# Patient Record
Sex: MALE | Employment: FULL TIME | URBAN - METROPOLITAN AREA
[De-identification: names, ages, dates, MRNs, and addresses within clinical notes are randomized per-mention and may not be internally consistent; named-entity substitution may affect disease eponyms.]

---

## 2018-02-06 ENCOUNTER — APPOINTMENT (OUTPATIENT)
Dept: CT IMAGING | Age: 48
End: 2018-02-06
Attending: EMERGENCY MEDICINE
Payer: COMMERCIAL

## 2018-02-06 ENCOUNTER — HOSPITAL ENCOUNTER (EMERGENCY)
Age: 48
Discharge: HOME | End: 2018-02-07
Attending: EMERGENCY MEDICINE
Payer: COMMERCIAL

## 2018-02-06 DIAGNOSIS — N20.0 KIDNEY STONE: Primary | ICD-10-CM

## 2018-02-06 LAB
APPEARANCE UR: CLEAR
BACTERIA URNS QL MICRO: NEGATIVE /HPF
BILIRUB UR QL: NEGATIVE
COLOR UR: YELLOW
EPITH CASTS URNS QL MICRO: NEGATIVE /LPF (ref 0–5)
GLUCOSE UR STRIP.AUTO-MCNC: NEGATIVE MG/DL
HGB UR QL STRIP: ABNORMAL
KETONES UR QL STRIP.AUTO: NEGATIVE MG/DL
LEUKOCYTE ESTERASE UR QL STRIP.AUTO: NEGATIVE
MUCOUS THREADS URNS QL MICRO: ABNORMAL /LPF
NITRITE UR QL STRIP.AUTO: NEGATIVE
PH UR STRIP: 5.5 [PH] (ref 5–8)
PROT UR STRIP-MCNC: NEGATIVE MG/DL
RBC #/AREA URNS HPF: ABNORMAL /HPF (ref 0–5)
SP GR UR REFRACTOMETRY: 1.02 (ref 1–1.03)
UROBILINOGEN UR QL STRIP.AUTO: 1 EU/DL (ref 0.2–1)
WBC URNS QL MICRO: NEGATIVE /HPF (ref 0–4)

## 2018-02-06 PROCEDURE — 99283 EMERGENCY DEPT VISIT LOW MDM: CPT

## 2018-02-06 PROCEDURE — 96361 HYDRATE IV INFUSION ADD-ON: CPT

## 2018-02-06 PROCEDURE — 81001 URINALYSIS AUTO W/SCOPE: CPT | Performed by: EMERGENCY MEDICINE

## 2018-02-06 PROCEDURE — 74011250636 HC RX REV CODE- 250/636: Performed by: EMERGENCY MEDICINE

## 2018-02-06 PROCEDURE — 74176 CT ABD & PELVIS W/O CONTRAST: CPT

## 2018-02-06 PROCEDURE — 96360 HYDRATION IV INFUSION INIT: CPT

## 2018-02-06 RX ORDER — OXYCODONE AND ACETAMINOPHEN 5; 325 MG/1; MG/1
1 TABLET ORAL
Qty: 10 TAB | Refills: 0 | Status: SHIPPED | OUTPATIENT
Start: 2018-02-06 | End: 2018-02-10

## 2018-02-06 RX ORDER — TAMSULOSIN HYDROCHLORIDE 0.4 MG/1
0.4 CAPSULE ORAL DAILY
Qty: 15 CAP | Refills: 0 | Status: SHIPPED | OUTPATIENT
Start: 2018-02-06 | End: 2018-02-10

## 2018-02-06 RX ADMIN — SODIUM CHLORIDE 1000 ML: 900 INJECTION, SOLUTION INTRAVENOUS at 22:31

## 2018-02-07 VITALS
OXYGEN SATURATION: 98 % | HEART RATE: 72 BPM | RESPIRATION RATE: 16 BRPM | BODY MASS INDEX: 25.77 KG/M2 | WEIGHT: 180 LBS | DIASTOLIC BLOOD PRESSURE: 80 MMHG | TEMPERATURE: 98.7 F | HEIGHT: 70 IN | SYSTOLIC BLOOD PRESSURE: 129 MMHG

## 2018-02-07 NOTE — ED PROVIDER NOTES
EMERGENCY DEPARTMENT HISTORY AND PHYSICAL EXAM    8:51 PM      Date: 2/6/2018  Patient Name: Nahid Cardona    History of Presenting Illness     Chief Complaint   Patient presents with    Abdominal Pain    Flank Pain     right    Vomiting         History Provided By: Patient    Chief Complaint: Abdominal Pain  Duration: 9 Hours  Timing:  Constant  Location: lower right side radiating into back  Quality:  Severity: 9 out of 10  Modifying Factors: denies hx of kidney stones or use of pain medication  Associated Symptoms: nausea, emesis. Denies fever, dysuria, hematuria, or abnormal BM      Additional History (Context): Nahid Cardona is a 49 y.o. male with No significant past medical history who presents with c/o constant 9 out of 10 lower right side abdominal pain radiating into lower back onset 9 hours. Pt states pain began at noon today (9 hours) with a singular episode of nausea/emesis at 1pm (8 hours ago) with pain setting in more once arriving home at 5:30pm (3.5 hours). Denies fever, dysuria, hematuria, or abnormal BM. Pt reports he has been belching a lot today. Denies use of medication for pain relief. Wife at bedside. PCP: None        Past History     Past Medical History:  No past medical history on file. Past Surgical History:  No past surgical history on file. Family History:  No family history on file. Social History:  Social History   Substance Use Topics    Smoking status: Not on file    Smokeless tobacco: Not on file    Alcohol use Not on file       Allergies: Allergies   Allergen Reactions    Codeine Other (comments)     Pt. States \"I throw up\". Review of Systems       Review of Systems   Constitutional: Negative for fever. HENT: Negative for congestion. Respiratory: Negative for cough and shortness of breath. Cardiovascular: Negative for chest pain and leg swelling.    Gastrointestinal: Positive for abdominal pain (lower right sided), nausea and vomiting. Negative for constipation and diarrhea. Genitourinary: Negative for dysuria and hematuria. Musculoskeletal: Positive for back pain (lower right side). Neurological: Negative for light-headedness and headaches. All other systems reviewed and are negative. Physical Exam     Visit Vitals    /80 (BP 1 Location: Left arm, BP Patient Position: Sitting)    Pulse 72    Temp 98.7 °F (37.1 °C)    Resp 16    Ht 5' 10\" (1.778 m)    Wt 81.6 kg (180 lb)    SpO2 98%    BMI 25.83 kg/m2         Physical Exam   Constitutional: He is oriented to person, place, and time. HENT:   Head: Atraumatic. Eyes: Conjunctivae are normal.   Neck: Neck supple. Cardiovascular: Normal rate, regular rhythm and normal heart sounds. Pulmonary/Chest: Effort normal and breath sounds normal. No respiratory distress. He exhibits no tenderness. Abdominal: Soft. Bowel sounds are normal. He exhibits no distension. There is no tenderness. There is no rebound and no guarding. No cva ttp bilaterally   Musculoskeletal: Normal range of motion. He exhibits no edema or tenderness. Neurological: He is alert and oriented to person, place, and time. Skin: Skin is warm and dry. Psychiatric: He has a normal mood and affect. Nursing note and vitals reviewed.         Diagnostic Study Results     Labs -  Recent Results (from the past 12 hour(s))   URINALYSIS W/ RFLX MICROSCOPIC    Collection Time: 02/06/18  7:53 PM   Result Value Ref Range    Color YELLOW      Appearance CLEAR      Specific gravity 1.022 1.005 - 1.030      pH (UA) 5.5 5.0 - 8.0      Protein NEGATIVE  NEG mg/dL    Glucose NEGATIVE  NEG mg/dL    Ketone NEGATIVE  NEG mg/dL    Bilirubin NEGATIVE  NEG      Blood LARGE (A) NEG      Urobilinogen 1.0 0.2 - 1.0 EU/dL    Nitrites NEGATIVE  NEG      Leukocyte Esterase NEGATIVE  NEG     URINE MICROSCOPIC ONLY    Collection Time: 02/06/18  7:53 PM   Result Value Ref Range    WBC NEGATIVE  0 - 4 /hpf    RBC 75 to 100 0 - 5 /hpf    Epithelial cells NEGATIVE  0 - 5 /lpf    Bacteria NEGATIVE  NEG /hpf    Mucus 1+ (A) NEG /lpf       Radiologic Studies -   CT ABD PELV WO CONT   Final Result            Medical Decision Making   I am the first provider for this patient. I reviewed the vital signs, available nursing notes, past medical history, past surgical history, family history and social history. Vital Signs-Reviewed the patient's vital signs. Provider Notes (Medical Decision Making):   Pt presenting with right flank pain, resolved significantly since onset. Abdomen benign. ua obtained and I have discussed results of work up with patient. No h/o stones. Ct a/p obtained and I have discussed results of work up with patient. Repeat exams benign. Stable for outpt follow up at this time. Return precautions discussed. Patient stated verbal understanding and agrees with course and plan. Diagnosis     Clinical Impression:   1. Kidney stone        Disposition: Discharged home in stable condition      Follow-up Information     Follow up With Details Comments [address] MD Call As needed [address]  [address] [facility] [address]  [phone]      [facility] EMERGENCY DEPT  As needed, If symptoms worsen [address] [address]  [phone]           Patient's Medications   Start Taking    OXYCODONE-ACETAMINOPHEN (PERCOCET) 5-325 MG PER TABLET    Take 1 Tab by mouth every four (4) hours as needed for Pain. Max Daily Amount: 6 Tabs. TAMSULOSIN (FLOMAX) 0.4 MG CAPSULE    Take 1 Cap by mouth daily for 15 days.    Continue Taking    No medications on file   These Medications have changed    No medications on file   Stop Taking    No medications on file     _______________________________    Attestations:  340Nader Rubio acting as a scribe for and in the presence of Viola Oconnor MD      February 06, 2018 at 8:05 PM       Provider Attestation:      I personally performed the services described in the documentation, reviewed the documentation, as recorded by the scribe in my presence, and it accurately and completely records my words and actions.  February 06, 2018 at 8:05 PM - Lucy Butler MD    _______________________________

## 2018-02-07 NOTE — ED TRIAGE NOTES
Pt. states \"I'm having severe abominal pain in the right lower section of my stomach and back  pain in my kidney area\" refers to right flank. Reports symptoms started \"around 12 today\". Pt. also reports vomiting twice today.

## 2018-02-07 NOTE — DISCHARGE INSTRUCTIONS

## 2018-02-10 ENCOUNTER — HOSPITAL ENCOUNTER (OUTPATIENT)
Age: 48
Setting detail: OBSERVATION
LOS: 1 days | Discharge: HOME | End: 2018-02-12
Attending: EMERGENCY MEDICINE | Admitting: INTERNAL MEDICINE
Payer: COMMERCIAL

## 2018-02-10 DIAGNOSIS — N20.0 KIDNEY STONE: ICD-10-CM

## 2018-02-10 DIAGNOSIS — N20.1 RIGHT URETERAL STONE: Primary | ICD-10-CM

## 2018-02-10 PROBLEM — N17.9 ACUTE KIDNEY INJURY (NONTRAUMATIC) (HCC): Status: ACTIVE | Noted: 2018-02-10

## 2018-02-10 PROBLEM — E86.0 DEHYDRATION: Status: ACTIVE | Noted: 2018-02-10

## 2018-02-10 LAB
ANION GAP SERPL CALC-SCNC: 8 MMOL/L (ref 3–18)
APPEARANCE UR: CLEAR
BACTERIA URNS QL MICRO: NEGATIVE /HPF
BASOPHILS # BLD: 0 K/UL (ref 0–0.06)
BASOPHILS NFR BLD: 0 % (ref 0–3)
BILIRUB UR QL: NEGATIVE
BUN SERPL-MCNC: 17 MG/DL (ref 7–18)
BUN/CREAT SERPL: 9 (ref 12–20)
CALCIUM SERPL-MCNC: 8.4 MG/DL (ref 8.5–10.1)
CAOX CRY URNS QL MICRO: ABNORMAL
CHLORIDE SERPL-SCNC: 106 MMOL/L (ref 100–108)
CO2 SERPL-SCNC: 25 MMOL/L (ref 21–32)
COLOR UR: YELLOW
CREAT SERPL-MCNC: 1.92 MG/DL (ref 0.6–1.3)
DIFFERENTIAL METHOD BLD: ABNORMAL
EOSINOPHIL # BLD: 0.2 K/UL (ref 0–0.4)
EOSINOPHIL NFR BLD: 2 % (ref 0–5)
EPITH CASTS URNS QL MICRO: ABNORMAL /LPF (ref 0–5)
ERYTHROCYTE [DISTWIDTH] IN BLOOD BY AUTOMATED COUNT: 12.9 % (ref 11.6–14.5)
GLUCOSE SERPL-MCNC: 103 MG/DL (ref 74–99)
GLUCOSE UR STRIP.AUTO-MCNC: NEGATIVE MG/DL
HCT VFR BLD AUTO: 36.7 % (ref 36–48)
HGB BLD-MCNC: 12.7 G/DL (ref 13–16)
HGB UR QL STRIP: ABNORMAL
KETONES UR QL STRIP.AUTO: NEGATIVE MG/DL
LEUKOCYTE ESTERASE UR QL STRIP.AUTO: NEGATIVE
LYMPHOCYTES # BLD: 0.6 K/UL (ref 0.8–3.5)
LYMPHOCYTES NFR BLD: 8 % (ref 20–51)
MCH RBC QN AUTO: 29.1 PG (ref 24–34)
MCHC RBC AUTO-ENTMCNC: 34.6 G/DL (ref 31–37)
MCV RBC AUTO: 84 FL (ref 74–97)
MONOCYTES # BLD: 0.5 K/UL (ref 0–1)
MONOCYTES NFR BLD: 6 % (ref 2–9)
NEUTS SEG # BLD: 6.6 K/UL (ref 1.8–8)
NEUTS SEG NFR BLD: 82 % (ref 42–75)
NITRITE UR QL STRIP.AUTO: NEGATIVE
OTHER CELLS NFR BLD MANUAL: 2 %
PH UR STRIP: 5.5 [PH] (ref 5–8)
PLATELET # BLD AUTO: 180 K/UL (ref 135–420)
PLATELET COMMENTS,PCOM: ABNORMAL
PMV BLD AUTO: 10.4 FL (ref 9.2–11.8)
POTASSIUM SERPL-SCNC: 4 MMOL/L (ref 3.5–5.5)
PROT UR STRIP-MCNC: NEGATIVE MG/DL
RBC # BLD AUTO: 4.37 M/UL (ref 4.7–5.5)
RBC #/AREA URNS HPF: ABNORMAL /HPF (ref 0–5)
RBC MORPH BLD: ABNORMAL
SODIUM SERPL-SCNC: 139 MMOL/L (ref 136–145)
SP GR UR REFRACTOMETRY: 1.02 (ref 1–1.03)
UROBILINOGEN UR QL STRIP.AUTO: 1 EU/DL (ref 0.2–1)
WBC # BLD AUTO: 8 K/UL (ref 4.6–13.2)
WBC URNS QL MICRO: ABNORMAL /HPF (ref 0–4)

## 2018-02-10 PROCEDURE — 81001 URINALYSIS AUTO W/SCOPE: CPT | Performed by: PHYSICIAN ASSISTANT

## 2018-02-10 PROCEDURE — 99218 HC RM OBSERVATION: CPT

## 2018-02-10 PROCEDURE — 74011000250 HC RX REV CODE- 250: Performed by: HOSPITALIST

## 2018-02-10 PROCEDURE — 96361 HYDRATE IV INFUSION ADD-ON: CPT

## 2018-02-10 PROCEDURE — 85025 COMPLETE CBC W/AUTO DIFF WBC: CPT | Performed by: PHYSICIAN ASSISTANT

## 2018-02-10 PROCEDURE — 80048 BASIC METABOLIC PNL TOTAL CA: CPT | Performed by: PHYSICIAN ASSISTANT

## 2018-02-10 PROCEDURE — 74011250636 HC RX REV CODE- 250/636: Performed by: HOSPITALIST

## 2018-02-10 PROCEDURE — 74011250636 HC RX REV CODE- 250/636: Performed by: EMERGENCY MEDICINE

## 2018-02-10 PROCEDURE — 96374 THER/PROPH/DIAG INJ IV PUSH: CPT

## 2018-02-10 PROCEDURE — 99283 EMERGENCY DEPT VISIT LOW MDM: CPT

## 2018-02-10 PROCEDURE — 96372 THER/PROPH/DIAG INJ SC/IM: CPT

## 2018-02-10 PROCEDURE — 96375 TX/PRO/DX INJ NEW DRUG ADDON: CPT

## 2018-02-10 PROCEDURE — 74011250636 HC RX REV CODE- 250/636: Performed by: INTERNAL MEDICINE

## 2018-02-10 PROCEDURE — 87086 URINE CULTURE/COLONY COUNT: CPT | Performed by: EMERGENCY MEDICINE

## 2018-02-10 RX ORDER — KETOROLAC TROMETHAMINE 30 MG/ML
30 INJECTION, SOLUTION INTRAMUSCULAR; INTRAVENOUS
Status: COMPLETED | OUTPATIENT
Start: 2018-02-10 | End: 2018-02-10

## 2018-02-10 RX ORDER — OXYCODONE AND ACETAMINOPHEN 5; 325 MG/1; MG/1
1 TABLET ORAL
COMMUNITY
End: 2018-02-12

## 2018-02-10 RX ORDER — CIPROFLOXACIN 500 MG/1
500 TABLET ORAL 2 TIMES DAILY
Qty: 6 TAB | Refills: 0 | Status: SHIPPED | OUTPATIENT
Start: 2018-02-10 | End: 2018-02-13

## 2018-02-10 RX ORDER — HYDROCODONE BITARTRATE AND ACETAMINOPHEN 5; 325 MG/1; MG/1
1 TABLET ORAL
Status: DISCONTINUED | OUTPATIENT
Start: 2018-02-10 | End: 2018-02-13 | Stop reason: HOSPADM

## 2018-02-10 RX ORDER — OXYCODONE AND ACETAMINOPHEN 5; 325 MG/1; MG/1
1 TABLET ORAL
Qty: 10 TAB | Refills: 0 | Status: SHIPPED | OUTPATIENT
Start: 2018-02-10 | End: 2018-02-12

## 2018-02-10 RX ORDER — TAMSULOSIN HYDROCHLORIDE 0.4 MG/1
0.4 CAPSULE ORAL DAILY
Qty: 15 CAP | Refills: 0 | Status: SHIPPED | OUTPATIENT
Start: 2018-02-10 | End: 2018-02-19

## 2018-02-10 RX ORDER — ADHESIVE BANDAGE
30 BANDAGE TOPICAL DAILY PRN
Status: DISCONTINUED | OUTPATIENT
Start: 2018-02-10 | End: 2018-02-13 | Stop reason: HOSPADM

## 2018-02-10 RX ORDER — SODIUM CHLORIDE 9 MG/ML
75 INJECTION, SOLUTION INTRAVENOUS CONTINUOUS
Status: DISCONTINUED | OUTPATIENT
Start: 2018-02-10 | End: 2018-02-12

## 2018-02-10 RX ORDER — IBUPROFEN 800 MG/1
800 TABLET ORAL EVERY 8 HOURS
Qty: 15 TAB | Refills: 0 | Status: SHIPPED | OUTPATIENT
Start: 2018-02-10 | End: 2018-02-12

## 2018-02-10 RX ORDER — TAMSULOSIN HYDROCHLORIDE 0.4 MG/1
0.4 CAPSULE ORAL DAILY
COMMUNITY
End: 2018-02-12

## 2018-02-10 RX ORDER — ACETAMINOPHEN 325 MG/1
650 TABLET ORAL
Status: DISCONTINUED | OUTPATIENT
Start: 2018-02-10 | End: 2018-02-13 | Stop reason: HOSPADM

## 2018-02-10 RX ORDER — MORPHINE SULFATE 2 MG/ML
1 INJECTION, SOLUTION INTRAMUSCULAR; INTRAVENOUS
Status: DISCONTINUED | OUTPATIENT
Start: 2018-02-10 | End: 2018-02-11

## 2018-02-10 RX ORDER — ONDANSETRON 2 MG/ML
4 INJECTION INTRAMUSCULAR; INTRAVENOUS
Status: DISCONTINUED | OUTPATIENT
Start: 2018-02-10 | End: 2018-02-13 | Stop reason: HOSPADM

## 2018-02-10 RX ORDER — HEPARIN SODIUM 5000 [USP'U]/ML
5000 INJECTION, SOLUTION INTRAVENOUS; SUBCUTANEOUS EVERY 8 HOURS
Status: DISCONTINUED | OUTPATIENT
Start: 2018-02-10 | End: 2018-02-13 | Stop reason: HOSPADM

## 2018-02-10 RX ADMIN — CEFTRIAXONE SODIUM 2 G: 2 INJECTION, POWDER, FOR SOLUTION INTRAMUSCULAR; INTRAVENOUS at 19:52

## 2018-02-10 RX ADMIN — SODIUM CHLORIDE 1000 ML: 900 INJECTION, SOLUTION INTRAVENOUS at 19:52

## 2018-02-10 RX ADMIN — KETOROLAC TROMETHAMINE 30 MG: 30 INJECTION, SOLUTION INTRAMUSCULAR at 19:52

## 2018-02-10 RX ADMIN — SODIUM CHLORIDE 150 ML/HR: 900 INJECTION, SOLUTION INTRAVENOUS at 19:53

## 2018-02-10 RX ADMIN — HEPARIN SODIUM 5000 UNITS: 5000 INJECTION, SOLUTION INTRAVENOUS; SUBCUTANEOUS at 21:56

## 2018-02-10 NOTE — IP AVS SNAPSHOT
[address] [place], [place] [id number] 
[phone] Patient: Romina Zamora MRN: [id number] :[id number] About your hospitalization You were admitted on:  February 10, 2018 You last received care in the:  [facility] PACU You were discharged on:  2018 Why you were hospitalized Your primary diagnosis was:  Not on File Your diagnoses also included:  Dehydration, Acute Kidney Injury (Nontraumatic) (Hcc), Right Ureteral Stone, Right Nephrolithiasis Follow-up Information Follow up With Details Comments Contact Info None   None ([phone]) Patient stated that they have no PCP Ely Gonsalves MD  the office will call and make an appt with you for lithotripsy [facility] [address] [place] [id number] 
[phone] Discharge Orders None A check ced indicates which time of day the medication should be taken. My Medications START taking these medications Instructions Each Dose to Equal  
 Morning Noon Evening Bedtime  
 cephALEXin 250 mg capsule Commonly known as:  Gail Desanctis Take 1 Cap by mouth two (2) times a day for 10 doses. Indications: PREVENTION OF BACTERIAL URINARY TRACT INFECTION  
 250 mg  
    
  
   
   
  
   
  
 ciprofloxacin HCl 500 mg tablet Commonly known as:  CIPRO Take 1 Tab by mouth two (2) times a day for 3 days. 500 mg HYDROcodone-acetaminophen 5-300 mg tablet Commonly known as:  Ruthell Montana Take 1 Tab by mouth every four (4) hours as needed for up to 10 doses. Max Daily Amount: 6 Tabs. 1 Tab  
    
   
   
   
  
 polyethylene glycol 17 gram packet Commonly known as:  Dinesh Chris Start taking on:  2018 Your last dose was:  18--9am  
   
 Take 1 Packet by mouth daily. 17 g CHANGE how you take these medications  Instructions Each Dose to Equal  
 Morning Noon Evening Bedtime  
 tamsulosin 0.4 mg capsule Commonly known as:  FLOMAX What changed:  Another medication with the same name was removed. Continue taking this medication, and follow the directions you see here. Take 1 Cap by mouth daily for 15 days. 0.4 mg  
    
   
   
  
   
  
  
STOP taking these medications   
 oxyCODONE-acetaminophen 5-325 mg per tablet Commonly known as:  PERCOCET PERCOCET 5-325 mg per tablet Generic drug:  oxyCODONE-acetaminophen Where to Get Your Medications Information on where to get these meds will be given to you by the nurse or doctor. ! Ask your nurse or doctor about these medications  
  cephALEXin 250 mg capsule  
 ciprofloxacin HCl 500 mg tablet HYDROcodone-acetaminophen 5-300 mg tablet  
 polyethylene glycol 17 gram packet  
 tamsulosin 0.4 mg capsule Discharge Instructions Kidney Stone: Care Instructions Your Care Instructions Kidney stones are formed when salts, minerals, and other substances normally found in the urine clump together. They can be as small as grains of sand or, rarely, as large as golf balls. While the stone is traveling through the ureter, which is the tube that carries urine from the kidney to the bladder, you will probably feel pain. The pain may be mild or very severe. You may also have some blood in your urine. As soon as the stone reaches the bladder, any intense pain should go away. If a stone is too large to pass on its own, you may need a medical procedure to help you pass the stone. The doctor has checked you carefully, but problems can develop later. If you notice any problems or new symptoms, get medical treatment right away. Follow-up care is a key part of your treatment and safety. Be sure to make and go to all appointments, and call your doctor if you are having problems.  It's also a good idea to know your test results and keep a list of the medicines you take. How can you care for yourself at home? · Drink plenty of fluids, enough so that your urine is light yellow or clear like water. If you have kidney, heart, or liver disease and have to limit fluids, talk with your doctor before you increase the amount of fluids you drink. · Take pain medicines exactly as directed. Call your doctor if you think you are having a problem with your medicine. ¨ If the doctor gave you a prescription medicine for pain, take it as prescribed. ¨ If you are not taking a prescription pain medicine, ask your doctor if you can take an over-the-counter medicine. Read and follow all instructions on the label. · Your doctor may ask you to strain your urine so that you can collect your kidney stone when it passes. You can use a kitchen strainer or a tea strainer to catch the stone. Store it in a plastic bag until you see your doctor again. Preventing future kidney stones Some changes in your diet may help prevent kidney stones. Depending on the cause of your stones, your doctor may recommend that you: · Drink plenty of fluids, enough so that your urine is light yellow or clear like water. If you have kidney, heart, or liver disease and have to limit fluids, talk with your doctor before you increase the amount of fluids you drink. · Limit coffee, tea, and alcohol. Also avoid grapefruit juice. · Do not take more than the recommended daily dose of vitamins C and D. 
· Avoid antacids such as Gaviscon, Maalox, Mylanta, or Tums. · Limit the amount of salt (sodium) in your diet. · Eat a balanced diet that is not too high in protein. · Limit foods that are high in a substance called oxalate, which can cause kidney stones. These foods include dark green vegetables, rhubarb, chocolate, wheat bran, nuts, cranberries, and beans. When should you call for help? Call your doctor now or seek immediate medical care if: 
? · You cannot keep down fluids. ? · Your pain gets worse. ? · You have a fever or chills. ? · You have new or worse pain in your back just below your rib cage (the flank area). ? · You have new or more blood in your urine. ? Watch closely for changes in your health, and be sure to contact your doctor if: 
? · You do not get better as expected. Where can you learn more? Go to http://marcella-jaqui.info/. Enter I244 in the search box to learn more about \"Kidney Stone: Care Instructions. \" Current as of: May 12, 2017 Content Version: 11.4 © 9991-7513 [employer]. Care instructions adapted under license by [employer] (which disclaims liability or warranty for this information). If you have questions about a medical condition or this instruction, always ask your healthcare professional. [address] any warranty or liability for your use of this information. Learning About Diet for Kidney Stone Prevention What are kidney stones? Kidney stones are made of salts and minerals in the urine that form small \"medhat. \" Stones can form in the kidneys and the ureters (the tubes that lead from the kidneys to the bladder). They can also form in the bladder. Stones may not cause a problem as long as they stay in the kidneys. But they can cause sudden, severe pain. Pain is most likely when the stones travel from the kidneys to the bladder. Kidney stones can cause bloody urine. Kidney stones often run in families. You are more likely to get them if you don't drink enough fluids, mainly water. Certain foods and drinks and some dietary supplements may also increase your risk for kidney stones if you consume too much of them. What can you do to prevent kidney stones? Changing what you eat may not prevent all types of kidney stones. But for people who have a history of certain kinds of kidney stones, some changes in diet may help.  A dietitian can help you set up a meal plan that includes healthy, low-oxalate choices. Here are some general guidelines to get you started. Plan your meals and snacks around foods that are low in oxalate. These foods include: · Corn, kale, parsnips, and squash,. · Beef, chicken, pork, turkey, and fish. · Milk, butter, cheese, and yogurt. You can eat certain foods that are medium-high in oxalate, but eat them only once in a while. These foods include: · Bread. · Brown rice. · English muffins. · Figs. · Popcorn. · String beans. · Tomatoes. Limit very high-oxalate foods, including: · Black tea. · Coffee. · Chocolate. · Dark green vegetables. · Nuts. Here are some other things you can do to help prevent kidney stones. · Drink plenty of fluids. If you have kidney, heart, or liver disease and have to limit fluids, talk with your doctor before you increase the amount of fluids you drink. · Do not take more than the recommended daily dose of vitamins C and D. 
· Limit the salt in your diet. · Eat a balanced diet that is not too high in protein. Follow-up care is a key part of your treatment and safety. Be sure to make and go to all appointments, and call your doctor if you are having problems. It's also a good idea to know your test results and keep a list of the medicines you take. Where can you learn more? Go to http://marcella-jaqui.info/. Enter C138 in the search box to learn more about \"Learning About Diet for Kidney Stone Prevention. \" Current as of: May 12, 2017 Content Version: 11.4 © 8650-8875 [employer]. Care instructions adapted under license by [employer] (which disclaims liability or warranty for this information). If you have questions about a medical condition or this instruction, always ask your healthcare professional. [address] any warranty or liability for your use of this information. DISCHARGE SUMMARY from Nurse PATIENT INSTRUCTIONS: 
 
 After general anesthesia or intravenous sedation, for 24 hours or while taking prescription Narcotics: · Limit your activities · Do not drive and operate hazardous machinery · Do not make important personal or business decisions · Do  not drink alcoholic beverages · If you have not urinated within 8 hours after discharge, please contact your surgeon on call. Report the following to your surgeon: 
· Excessive pain, swelling, redness or odor of or around the surgical area · Temperature over 100.5 · Nausea and vomiting lasting longer than 4 hours or if unable to take medications · Any signs of decreased circulation or nerve impairment to extremity: change in color, persistent  numbness, tingling, coldness or increase pain · Any questions What to do at Home: 
Recommended activity: Activity as tolerated and no driving for today and No driving while on analgesics. If you experience any of the following symptoms chest pain, shortness of breath, severe pain, temp > 101 please follow up with Emergency Department. If passing blood clots through urine, call urologist office. *  Please give a list of your current medications to your Primary Care Provider. *  Please update this list whenever your medications are discontinued, doses are 
    changed, or new medications (including over-the-counter products) are added. *  Please carry medication information at all times in case of emergency situations. These are general instructions for a healthy lifestyle: No smoking/ No tobacco products/ Avoid exposure to second hand smoke Surgeon General's Warning:  Quitting smoking now greatly reduces serious risk to your health. Obesity, smoking, and sedentary lifestyle greatly increases your risk for illness A healthy diet, regular physical exercise & weight monitoring are important for maintaining a healthy lifestyle You may be retaining fluid if you have a history of heart failure or if you experience any of the following symptoms:  Weight gain of 3 pounds or more overnight or 5 pounds in a week, increased swelling in our hands or feet or shortness of breath while lying flat in bed. Please call your doctor as soon as you notice any of these symptoms; do not wait until your next office visit. Recognize signs and symptoms of STROKE: 
 
F-face looks uneven A-arms unable to move or move unevenly S-speech slurred or non-existent T-time-call 911 as soon as signs and symptoms begin-DO NOT go Back to bed or wait to see if you get better-TIME IS BRAIN. Warning Signs of HEART ATTACK Call 911 if you have these symptoms: 
? Chest discomfort. Most heart attacks involve discomfort in the center of the chest that lasts more than a few minutes, or that goes away and comes back. It can feel like uncomfortable pressure, squeezing, fullness, or pain. ? Discomfort in other areas of the upper body. Symptoms can include pain or discomfort in one or both arms, the back, neck, jaw, or stomach. ? Shortness of breath with or without chest discomfort. ? Other signs may include breaking out in a cold sweat, nausea, or lightheadedness. Don't wait more than five minutes to call [address]! Fast action can save your life. Calling 911 is almost always the fastest way to get lifesaving treatment. Emergency Medical Services staff can begin treatment when they arrive  up to an hour sooner than if someone gets to the hospital by car. The discharge information has been reviewed with the patient. The patient verbalized understanding. Discharge medications reviewed with the patient and appropriate educational materials and side effects teaching were provided. Patient armband removed and shredded 
 
___________________________________________________________________________________________________________________________________ Introducing [facility] HE[facility]! Willy Ramirez introduces Task Messenger patient portal. Now you can access parts of your medical record, email your doctor's office, and request medication refills online. 1. In your internet browser, go to [email or web address] 2. Click on the First Time User? Click Here link in the Sign In box. You will see the New Member Sign Up page. 3. Enter your Task Messenger Access Code exactly as it appears below. You will not need to use this code after youve completed the sign-up process. If you do not sign up before the expiration date, you must request a new code. · Task Messenger Access Code: [email or web address] Expires: 5/7/2018 11:44 PM 
 
4. Enter the last four digits of your Social Security Number (xxxx) and Date of Birth (mm/dd/yyyy) as indicated and click Submit. You will be taken to the next sign-up page. 5. Create a Task Messenger ID. This will be your Task Messenger login ID and cannot be changed, so think of one that is secure and easy to remember. 6. Create a Task Messenger password. You can change your password at any time. 7. Enter your Password Reset Question and Answer. This can be used at a later time if you forget your password. 8. Enter your e-mail address. You will receive e-mail notification when new information is available in [address] 9. Click Sign Up. You can now view and download portions of your medical record. 10. Click the Download Summary menu link to download a portable copy of your medical information. If you have questions, please visit the Frequently Asked Questions section of the Task Messenger website. Remember, Task Messenger is NOT to be used for urgent needs. For medical emergencies, dial 911. Now available from your iPhone and Android! Unresulted Labs-Please follow up with your PCP about these lab tests Order Current Status XR RETROGRADE PYELOGRAM In process Providers Seen During Your Hospitalization Provider Specialty Primary office phone Keren Young MD Emergency Medicine [phone] Marilee Mensah MD Internal Medicine [phone] Holly Domingo MD Internal Medicine [phone] Immunizations Administered for This Admission Name Date Influenza Vaccine (Quad) PF  Deferred () Your Primary Care Physician (PCP) Primary Care Physician Office Phone Office Fax NONE ** None ** ** None ** You are allergic to the following Allergen Reactions Codeine Other (comments) Pt. States \"I throw up\". Recent Documentation Weight BMI  
  
  
  
 82.5 kg 26.1 kg/m2 Emergency Contacts Name Discharge Info Relation Home Work Mobile [facility] DISCHARGE CAREGIVER [3] Spouse [phone] Patient Belongings The following personal items are in your possession at time of discharge: 
  Dental Appliances: None         Home Medications: None   Jewelry: Ring  Clothing: Jacket/Coat, Pants, Shirt    Other Valuables: Cell Phone Please provide this summary of care documentation to your next provider. Signatures-by signing, you are acknowledging that this After Visit Summary has been reviewed with you and you have received a copy. Patient Signature:  ____________________________________________________________ Date:  ____________________________________________________________  
  
Charisma Lopez Provider Signature:  ____________________________________________________________ Date:  ____________________________________________________________

## 2018-02-10 NOTE — Clinical Note
Patient Class[de-identified] Observation [871] Type of Bed: Medical [8] Reason for Observation: see H&P Admitting Diagnosis: Right nephrolithiasis [[id number]] Admitting Physician: Therese Cook [[id number]] Attending Physician: Therese Cook [[id number]]

## 2018-02-10 NOTE — ED PROVIDER NOTES
EMERGENCY DEPARTMENT HISTORY AND PHYSICAL EXAM    Date: 2/10/2018  Patient Name: Carmen Ambrose    History of Presenting Illness     No chief complaint on file. History Provided By: Patient    Chief Complaint: kidney stone  Duration: 10 Days  Timing:  Constant  Location: right flank  Quality: Sharp  Severity: Severe  Modifying Factors: improvement with pain medications  Associated Symptoms: denies any other associated signs or symptoms      Additional History (Context): Carmen Ambrose is a 49 y.o. male with diagnosis of kidney stones on 2/6 who presents to the ED with cotinued flank pain. HE rates his pain as 10/10 and constant. He ran out of medications from last visit and went to [facility] who recommended he come back here. HE states that his pain has moved from flank into his lower back. HE has not followed up with Urology. HE dneis fever, painful urination, CP and SOB. PCP: None    Current Facility-Administered Medications   Medication Dose Route Frequency Provider Last Rate Last Dose    sodium chloride 0.9 % bolus infusion 1,000 mL  1,000 mL IntraVENous ONCE EDUIN Fairchild        0.9% sodium chloride infusion  150 mL/hr IntraVENous CONTINUOUS EDUIN Fairchild        cefTRIAXone (ROCEPHIN) 2 g in sterile water (preservative free) 20 mL IV syringe  2 g IntraVENous NOW Tess Paul III, MD        ketorolac (TORADOL) injection 30 mg  30 mg IntraVENous NOW EDUIN Fairchild         Current Outpatient Prescriptions   Medication Sig Dispense Refill    tamsulosin (FLOMAX) 0.4 mg capsule Take 1 Cap by mouth daily for 15 days. 15 Cap 0    oxyCODONE-acetaminophen (PERCOCET) 5-325 mg per tablet Take 1 Tab by mouth every four (4) hours as needed for Pain. Max Daily Amount: 6 Tabs. 10 Tab 0    ibuprofen (MOTRIN) 800 mg tablet Take 1 Tab by mouth every eight (8) hours for 5 days. 15 Tab 0    ciprofloxacin HCl (CIPRO) 500 mg tablet Take 1 Tab by mouth two (2) times a day for 3 days.  6 Tab 0       Past History     Past Medical History:  No past medical history on file. Past Surgical History:  No past surgical history on file. Family History:  No family history on file. Social History:  Social History   Substance Use Topics    Smoking status: Not on file    Smokeless tobacco: Not on file    Alcohol use Not on file       Allergies: Allergies   Allergen Reactions    Codeine Other (comments)     Pt. States \"I throw up\". Review of Systems   Review of Systems   Constitutional: Negative for fatigue and fever. HENT: Negative for congestion. Respiratory: Negative for cough and shortness of breath. Cardiovascular: Negative for chest pain. Gastrointestinal: Negative for abdominal pain, diarrhea, nausea and vomiting. Genitourinary: Positive for flank pain. Negative for dysuria, frequency and urgency. Skin: Negative for wound. Neurological: Negative for dizziness and headaches. All other systems reviewed and are negative. All Other Systems Negative  Physical Exam     Vitals:    02/10/18 1641   BP: 129/83   Pulse: 82   Resp: 18   Temp: 98.2 °F (36.8 °C)   SpO2: 100%     Physical Exam   Constitutional: He is oriented to person, place, and time. He appears well-developed and well-nourished. He appears distressed (mildly). HENT:   Head: Normocephalic and atraumatic. Nose: Nose normal.   Eyes: Conjunctivae are normal. Pupils are equal, round, and reactive to light. Neck: Normal range of motion. Cardiovascular: Normal rate, regular rhythm and normal heart sounds. Pulmonary/Chest: Effort normal and breath sounds normal. No respiratory distress. He has no wheezes. Abdominal: Soft. Bowel sounds are normal. He exhibits no distension. There is no tenderness. Musculoskeletal: Normal range of motion. Neurological: He is alert and oriented to person, place, and time. Skin: Skin is warm and dry. Psychiatric: He has a normal mood and affect.  His behavior is normal. Diagnostic Study Results     Labs -     Recent Results (from the past 12 hour(s))   URINALYSIS W/ RFLX MICROSCOPIC    Collection Time: 02/10/18  5:30 PM   Result Value Ref Range    Color YELLOW      Appearance CLEAR      Specific gravity 1.025 1.005 - 1.030      pH (UA) 5.5 5.0 - 8.0      Protein NEGATIVE  NEG mg/dL    Glucose NEGATIVE  NEG mg/dL    Ketone NEGATIVE  NEG mg/dL    Bilirubin NEGATIVE  NEG      Blood MODERATE (A) NEG      Urobilinogen 1.0 0.2 - 1.0 EU/dL    Nitrites NEGATIVE  NEG      Leukocyte Esterase NEGATIVE  NEG     URINE MICROSCOPIC ONLY    Collection Time: 02/10/18  5:30 PM   Result Value Ref Range    WBC 0 to 3 0 - 4 /hpf    RBC 3 to 5 0 - 5 /hpf    Epithelial cells FEW 0 - 5 /lpf    Bacteria NEGATIVE  NEG /hpf    CA Oxalate crystals FEW (A) NEG     CBC WITH AUTOMATED DIFF    Collection Time: 02/10/18  6:00 PM   Result Value Ref Range    WBC 8.0 4.6 - 13.2 K/uL    RBC 4.37 (L) 4.70 - 5.50 M/uL    HGB 12.7 (L) 13.0 - 16.0 g/dL    HCT 36.7 36.0 - 48.0 %    MCV 84.0 74.0 - 97.0 FL    MCH 29.1 24.0 - 34.0 PG    MCHC 34.6 31.0 - 37.0 g/dL    RDW 12.9 11.6 - 14.5 %    PLATELET 258 233 - 176 K/uL    MPV 10.4 9.2 - 11.8 FL    NEUTROPHILS 82 (H) 42 - 75 %    LYMPHOCYTES 8 (L) 20 - 51 %    MONOCYTES 6 2 - 9 %    EOSINOPHILS 2 0 - 5 %    BASOPHILS 0 0 - 3 %    OTHER CELL 2 (H) 0      ABS. NEUTROPHILS 6.6 1.8 - 8.0 K/UL    ABS. LYMPHOCYTES 0.6 (L) 0.8 - 3.5 K/UL    ABS. MONOCYTES 0.5 0 - 1.0 K/UL    ABS. EOSINOPHILS 0.2 0.0 - 0.4 K/UL    ABS.  BASOPHILS 0.0 0.0 - 0.06 K/UL    DF MANUAL      PLATELET COMMENTS ADEQUATE PLATELETS      RBC COMMENTS NORMOCYTIC, NORMOCHROMIC     METABOLIC PANEL, BASIC    Collection Time: 02/10/18  6:00 PM   Result Value Ref Range    Sodium 139 136 - 145 mmol/L    Potassium 4.0 3.5 - 5.5 mmol/L    Chloride 106 100 - 108 mmol/L    CO2 25 21 - 32 mmol/L    Anion gap 8 3.0 - 18 mmol/L    Glucose 103 (H) 74 - 99 mg/dL    BUN 17 7.0 - 18 MG/DL    Creatinine 1.92 (H) 0.6 - 1.3 MG/DL    BUN/Creatinine ratio 9 (L) 12 - 20      GFR est AA 46 (L) >60 ml/min/1.73m2    GFR est non-AA 38 (L) >60 ml/min/1.73m2    Calcium 8.4 (L) 8.5 - 10.1 MG/DL       Radiologic Studies -   No orders to display     CT Results  (Last 48 hours)    None        CXR Results  (Last 48 hours)    None            Medical Decision Making   I am the first provider for this patient. I reviewed the vital signs, available nursing notes, past medical history, past surgical history, family history and social history. Vital Signs-Reviewed the patient's vital signs. Pulse Oximetry Analysis - 100% on RA      Records Reviewed: Old Medical Records    Procedures:  Procedures    Provider Notes (Medical Decision Making):     Labs Reviewed   CBC WITH AUTOMATED DIFF - Abnormal; Notable for the following:        Result Value    RBC 4.37 (*)     HGB 12.7 (*)     NEUTROPHILS 82 (*)     LYMPHOCYTES 8 (*)     OTHER CELL 2 (*)     ABS. LYMPHOCYTES 0.6 (*)     All other components within normal limits   METABOLIC PANEL, BASIC - Abnormal; Notable for the following:     Glucose 103 (*)     Creatinine 1.92 (*)     BUN/Creatinine ratio 9 (*)     GFR est AA 46 (*)     GFR est non-AA 38 (*)     Calcium 8.4 (*)     All other components within normal limits   URINALYSIS W/ RFLX MICROSCOPIC - Abnormal; Notable for the following:     Blood MODERATE (*)     All other components within normal limits   URINE MICROSCOPIC ONLY - Abnormal; Notable for the following:     CA Oxalate crystals FEW (*)     All other components within normal limits   CULTURE, URINE     6:05 PM discussed pt with EDUIN trinh who will assume care of pt at this time. Pending labs. I spoke with Dr. Dara Reece voicing my concerns that 4d ago pt already had mild nephro, is dehydrated and pain not captured with oral Percocet. Will continue IVF, admit, give pain meds and start ABX IV; urine cultured. Admit.     MED RECONCILIATION:  Current Facility-Administered Medications Medication Dose Route Frequency    sodium chloride 0.9 % bolus infusion 1,000 mL  1,000 mL IntraVENous ONCE    0.9% sodium chloride infusion  150 mL/hr IntraVENous CONTINUOUS    cefTRIAXone (ROCEPHIN) 2 g in sterile water (preservative free) 20 mL IV syringe  2 g IntraVENous NOW    ketorolac (TORADOL) injection 30 mg  30 mg IntraVENous NOW     Current Outpatient Prescriptions   Medication Sig    tamsulosin (FLOMAX) 0.4 mg capsule Take 1 Cap by mouth daily for 15 days.  oxyCODONE-acetaminophen (PERCOCET) 5-325 mg per tablet Take 1 Tab by mouth every four (4) hours as needed for Pain. Max Daily Amount: 6 Tabs.  ibuprofen (MOTRIN) 800 mg tablet Take 1 Tab by mouth every eight (8) hours for 5 days.  ciprofloxacin HCl (CIPRO) 500 mg tablet Take 1 Tab by mouth two (2) times a day for 3 days. Disposition:  admit      Follow-up Information     None          Current Discharge Medication List      START taking these medications    Details   ibuprofen (MOTRIN) 800 mg tablet Take 1 Tab by mouth every eight (8) hours for 5 days. Qty: 15 Tab, Refills: 0      ciprofloxacin HCl (CIPRO) 500 mg tablet Take 1 Tab by mouth two (2) times a day for 3 days. Qty: 6 Tab, Refills: 0         CONTINUE these medications which have CHANGED    Details   tamsulosin (FLOMAX) 0.4 mg capsule Take 1 Cap by mouth daily for 15 days. Qty: 15 Cap, Refills: 0      oxyCODONE-acetaminophen (PERCOCET) 5-325 mg per tablet Take 1 Tab by mouth every four (4) hours as needed for Pain. Max Daily Amount: 6 Tabs. Qty: 10 Tab, Refills: 0    Associated Diagnoses: Kidney stone               Core Measures:    Critical Care Time:   Critical Care Time:   I have spent 30 minutes of critical care time involved in lab review, consultations with specialist, family decision-making, and documentation. During this entire length of time I was immediately available to the patient.     Critical Care:   The reason for providing this level of medical care for this critically ill patient was due a critical illness that impaired one or more vital organ systems such that there was a high probability of imminent or life threatening deterioration in the patients condition. This care involved high complexity decision making to assess, manipulate, and support vital system functions, to treat this degreee vital organ system failure and to prevent further life threatening deterioration of the patients condition. For Hospitalized Patients:    1. Hospitalization Decision Time:  The decision to hospitalize the patient was made by Dr. Mable Marie and EDUIN Felder with yazan Arguello at 7:34p on 2/10/2018    2. Aspirin:     Diagnosis     Clinical Impression:   1. Right ureteral stone    2.  Kidney stone

## 2018-02-10 NOTE — IP AVS SNAPSHOT
[address] [place] [address] 
[phone] Patient: Vipin Cruz MRN: [id number] :1970 A check ced indicates which time of day the medication should be taken. My Medications START taking these medications Instructions Each Dose to Equal  
 Morning Noon Evening Bedtime  
 cephALEXin 250 mg capsule Commonly known as:  Lollie [place] Take 1 Cap by mouth two (2) times a day for 10 doses. Indications: PREVENTION OF BACTERIAL URINARY TRACT INFECTION  
 250 mg  
    
  
   
   
  
   
  
 ciprofloxacin HCl 500 mg tablet Commonly known as:  CIPRO Take 1 Tab by mouth two (2) times a day for 3 days. 500 mg HYDROcodone-acetaminophen 5-300 mg tablet Commonly known as:  Kamila Fey Take 1 Tab by mouth every four (4) hours as needed for up to 10 doses. Max Daily Amount: 6 Tabs. 1 Tab  
    
   
   
   
  
 polyethylene glycol 17 gram packet Commonly known as:  Lorain Devoid Start taking on:  2018 Your last dose was:  18--9am  
   
 Take 1 Packet by mouth daily. 17 g CHANGE how you take these medications Instructions Each Dose to Equal  
 Morning Noon Evening Bedtime  
 tamsulosin 0.4 mg capsule Commonly known as:  FLOMAX What changed:  Another medication with the same name was removed. Continue taking this medication, and follow the directions you see here. Take 1 Cap by mouth daily for 15 days. 0.4 mg  
    
   
   
  
   
  
  
STOP taking these medications   
 oxyCODONE-acetaminophen 5-325 mg per tablet Commonly known as:  PERCOCET PERCOCET 5-325 mg per tablet Generic drug:  oxyCODONE-acetaminophen Where to Get Your Medications Information on where to get these meds will be given to you by the nurse or doctor. ! Ask your nurse or doctor about these medications cephALEXin 250 mg capsule  
 ciprofloxacin HCl 500 mg tablet HYDROcodone-acetaminophen 5-300 mg tablet  
 polyethylene glycol 17 gram packet  
 tamsulosin 0.4 mg capsule

## 2018-02-11 LAB
ANION GAP SERPL CALC-SCNC: 5 MMOL/L (ref 3–18)
BUN SERPL-MCNC: 17 MG/DL (ref 7–18)
BUN/CREAT SERPL: 9 (ref 12–20)
CALCIUM SERPL-MCNC: 7.3 MG/DL (ref 8.5–10.1)
CHLORIDE SERPL-SCNC: 109 MMOL/L (ref 100–108)
CO2 SERPL-SCNC: 26 MMOL/L (ref 21–32)
CREAT SERPL-MCNC: 1.83 MG/DL (ref 0.6–1.3)
GLUCOSE SERPL-MCNC: 103 MG/DL (ref 74–99)
POTASSIUM SERPL-SCNC: 3.6 MMOL/L (ref 3.5–5.5)
SODIUM SERPL-SCNC: 140 MMOL/L (ref 136–145)

## 2018-02-11 PROCEDURE — 74011250637 HC RX REV CODE- 250/637: Performed by: INTERNAL MEDICINE

## 2018-02-11 PROCEDURE — 96361 HYDRATE IV INFUSION ADD-ON: CPT

## 2018-02-11 PROCEDURE — 80048 BASIC METABOLIC PNL TOTAL CA: CPT | Performed by: INTERNAL MEDICINE

## 2018-02-11 PROCEDURE — 99218 HC RM OBSERVATION: CPT

## 2018-02-11 PROCEDURE — 74011250636 HC RX REV CODE- 250/636: Performed by: INTERNAL MEDICINE

## 2018-02-11 PROCEDURE — 96372 THER/PROPH/DIAG INJ SC/IM: CPT

## 2018-02-11 PROCEDURE — 96375 TX/PRO/DX INJ NEW DRUG ADDON: CPT

## 2018-02-11 PROCEDURE — 96376 TX/PRO/DX INJ SAME DRUG ADON: CPT

## 2018-02-11 PROCEDURE — 36415 COLL VENOUS BLD VENIPUNCTURE: CPT | Performed by: INTERNAL MEDICINE

## 2018-02-11 RX ORDER — POLYETHYLENE GLYCOL 3350 17 G/17G
17 POWDER, FOR SOLUTION ORAL DAILY
Status: DISCONTINUED | OUTPATIENT
Start: 2018-02-11 | End: 2018-02-13 | Stop reason: HOSPADM

## 2018-02-11 RX ORDER — MORPHINE SULFATE 2 MG/ML
2 INJECTION, SOLUTION INTRAMUSCULAR; INTRAVENOUS ONCE
Status: COMPLETED | OUTPATIENT
Start: 2018-02-12 | End: 2018-02-11

## 2018-02-11 RX ORDER — AMOXICILLIN 250 MG
1 CAPSULE ORAL DAILY
Status: DISCONTINUED | OUTPATIENT
Start: 2018-02-11 | End: 2018-02-13 | Stop reason: HOSPADM

## 2018-02-11 RX ORDER — FAMOTIDINE 20 MG/1
20 TABLET, FILM COATED ORAL DAILY
Status: DISCONTINUED | OUTPATIENT
Start: 2018-02-11 | End: 2018-02-13 | Stop reason: HOSPADM

## 2018-02-11 RX ADMIN — Medication 1 MG: at 05:31

## 2018-02-11 RX ADMIN — ACETAMINOPHEN 650 MG: 325 TABLET ORAL at 21:26

## 2018-02-11 RX ADMIN — HYDROCODONE BITARTRATE AND ACETAMINOPHEN 1 TABLET: 5; 325 TABLET ORAL at 15:27

## 2018-02-11 RX ADMIN — MAGNESIUM HYDROXIDE 30 ML: 400 SUSPENSION ORAL at 05:30

## 2018-02-11 RX ADMIN — HEPARIN SODIUM 5000 UNITS: 5000 INJECTION, SOLUTION INTRAVENOUS; SUBCUTANEOUS at 13:31

## 2018-02-11 RX ADMIN — ONDANSETRON 4 MG: 2 INJECTION INTRAMUSCULAR; INTRAVENOUS at 05:30

## 2018-02-11 RX ADMIN — Medication 2 MG: at 23:42

## 2018-02-11 RX ADMIN — FAMOTIDINE 20 MG: 20 TABLET, FILM COATED ORAL at 13:31

## 2018-02-11 RX ADMIN — HEPARIN SODIUM 5000 UNITS: 5000 INJECTION, SOLUTION INTRAVENOUS; SUBCUTANEOUS at 05:22

## 2018-02-11 RX ADMIN — SODIUM CHLORIDE 75 ML/HR: 900 INJECTION, SOLUTION INTRAVENOUS at 15:28

## 2018-02-11 RX ADMIN — HYDROCODONE BITARTRATE AND ACETAMINOPHEN 1 TABLET: 5; 325 TABLET ORAL at 19:49

## 2018-02-11 RX ADMIN — HEPARIN SODIUM 5000 UNITS: 5000 INJECTION, SOLUTION INTRAVENOUS; SUBCUTANEOUS at 21:24

## 2018-02-11 RX ADMIN — POLYETHYLENE GLYCOL 3350 17 G: 17 POWDER, FOR SOLUTION ORAL at 13:31

## 2018-02-11 NOTE — PROGRESS NOTES
0700--Bedside shift change report given to DOUGIE Avila, RN (oncoming nurse) by Tawanna Anderson RN (offgoing nurse). Report included the following information SBAR, Kardex, MAR and Recent Results. Pt awake, denies pain after med. 1200--Strainer provided in Bathroom. 1900--Bedside shift change report given to Kaitlin LOPEZ (oncoming nurse) by Nando Mc RN  (offgoing nurse). Report included the following information SBAR, Kardex, MAR and Recent Results. Pain med provided.

## 2018-02-11 NOTE — PROGRESS NOTES
SUBJECTIVE:    Sitting up in chair. Right flank pain off and on. No BM yet but passing flatus. No nausea or vomiting. No SOB or cough. Addendum:  Case discussed with dr. Ashlee Alonso and he will come and see patient in an hour. OBJECTIVE:    /75 (BP 1 Location: Left arm, BP Patient Position: At rest)  Pulse 70  Temp 97.9 °F (36.6 °C)  Resp 20  Wt 80.6 kg (177 lb 12.8 oz)  SpO2 98%  BMI 25.51 kg/m2    CVS: RRR  RS: CTA bilaterally, no wheezes  GI: NT, ND, BS +  : No CVA tenderness  Extremities: no pedal edema  General: NAD, Awake    ASSESSMENT:    1. Right flank pain sec to Mild right hydronephrosis secondary to a 6 mm stone at the right UPJ.  2. JORGE  3. Constipation   4. Small hiatal hernia.     PLAN:    Reduce IVF  Follow urine c/s  Adjust pain medication  Treat constipation  Trying to get hold of Urology on call for last 45 minutes      CMP:   Lab Results   Component Value Date/Time     02/11/2018 02:11 AM    K 3.6 02/11/2018 02:11 AM     (H) 02/11/2018 02:11 AM    CO2 26 02/11/2018 02:11 AM    AGAP 5 02/11/2018 02:11 AM     (H) 02/11/2018 02:11 AM    BUN 17 02/11/2018 02:11 AM    CREA 1.83 (H) 02/11/2018 02:11 AM    GFRAA 48 (L) 02/11/2018 02:11 AM    GFRNA 40 (L) 02/11/2018 02:11 AM    CA 7.3 (L) 02/11/2018 02:11 AM     CBC:   Lab Results   Component Value Date/Time    WBC 8.0 02/10/2018 06:00 PM    HGB 12.7 (L) 02/10/2018 06:00 PM    HCT 36.7 02/10/2018 06:00 PM     02/10/2018 06:00 PM

## 2018-02-11 NOTE — H&P
Hospitalist Admission Note    NAME: Romina Zamora   :  1970   MRN:  [id number]     Date/Time of admission:  2/10/2018 7:26 PM    Patient PCP: None  ________________________________________________________________________    My assessment of this patient's clinical condition and my plan of care is as follows. Assessment / Plan:  1. Acute right nephrolithiasis  2. Hydronephrosis d/t above  3. Possible JORGE d/t above - no comparable creatinine    1. Admit for pain control and hydration  2. Dr. Asa Leggett (Urology) on board   3. Monitor renal function and if creatinine worsens, defer to urology for possible ureteral intervention  4. Received 2 grams of rocephin in the ED. Will not continue abx at this point, but monitor for need. 5. Observation     Code Status: full   Surrogate Decision Maker: patient    DVT Prophylaxis: sc hep  GI Prophylaxis: not indicated          Subjective:   CHIEF COMPLAINT: right flank pain    HISTORY OF PRESENT ILLNESS:     Romina Zamora is a 49 y.o.  male who presents with continued flank pain after noted nephrolithiasis seen on imaging 4 days ago. Pt has no pmhx and presented to the ED on 2018 for flank pain and was noted to have a 6 mm right UPJ stone with mild hydronephrosis. Pt was given pain meds and flomax. Apparently the pain did not resolve and pt presented again and had a bmp performed today. His creatinine was noted to be approximately 1.9. Urology was called by the ED and an obs stay for hydration and evaluation was requested. Unfortunately, no prior bmp was performed for comparison to determine the acuity of pt's renal status. We were asked to admit for work up and evaluation of the above problems. No past medical history on file. No past surgical history on file.     Social History   Substance Use Topics    Smoking status: Not on file    Smokeless tobacco: Not on file    Alcohol use Not on file        No family history on file.  Allergies   Allergen Reactions    Codeine Other (comments)     Pt. States \"I throw up\". Prior to Admission medications    Medication Sig Start Date End Date Taking? Authorizing Provider   tamsulosin (FLOMAX) 0.4 mg capsule Take 1 Cap by mouth daily for 15 days. 2/10/18 2/25/18 Yes EDUIN Oneill   oxyCODONE-acetaminophen (PERCOCET) 5-325 mg per tablet Take 1 Tab by mouth every four (4) hours as needed for Pain. Max Daily Amount: 6 Tabs. 2/10/18  Yes EDUIN Oneill   ibuprofen (MOTRIN) 800 mg tablet Take 1 Tab by mouth every eight (8) hours for 5 days. 2/10/18 2/15/18 Yes EDUIN Oneill   ciprofloxacin HCl (CIPRO) 500 mg tablet Take 1 Tab by mouth two (2) times a day for 3 days. 2/10/18 2/13/18 Yes EDUIN Oneill       REVIEW OF SYSTEMS:     I am not able to complete the review of systems because:    The patient is intubated and sedated    The patient has altered mental status due to his acute medical problems    The patient has baseline aphasia from prior stroke(s)    The patient has baseline dementia and is not reliable historian    The patient is in acute medical distress and unable to provide information           Total of 12 systems reviewed as follows:       POSITIVE= bolded text  Negative = text not underlined  General:  fever, chills, sweats, generalized weakness, weight loss/gain,      loss of appetite   Eyes:    blurred vision, eye pain, loss of vision, double vision  ENT:    rhinorrhea, pharyngitis   Respiratory:   cough, sputum production, SOB, KYLE, wheezing, pleuritic pain   Cardiology:   chest pain, palpitations, orthopnea, PND, edema, syncope   Gastrointestinal:  abdominal pain , N/V, diarrhea, dysphagia, constipation, bleeding   Genitourinary:  frequency, urgency, dysuria, hematuria, incontinence   Muskuloskeletal :  arthralgia, myalgia, back pain, flank pain  Hematology:  easy bruising, nose or gum bleeding, lymphadenopathy   Dermatological: rash, ulceration, pruritis, color change / jaundice  Endocrine:   hot flashes or polydipsia   Neurological:  headache, dizziness, confusion, focal weakness, paresthesia,     Speech difficulties, memory loss, gait difficulty  Psychological: Feelings of anxiety, depression, agitation    Objective:   VITALS:    Visit Vitals    /83 (BP 1 Location: Left arm, BP Patient Position: Sitting)    Pulse 82    Temp 98.2 °F (36.8 °C)    Resp 18    SpO2 100%       PHYSICAL EXAM:    General:    Alert, cooperative, moderate distress, appears stated age. HEENT: Atraumatic, anicteric sclerae, pink conjunctivae     No oral ulcers, mucosa moist, throat clear, dentition fair  Neck:  Supple, symmetrical,  thyroid: non tender  Lungs:   Clear to auscultation bilaterally. No Wheezing or Rhonchi. No rales. Chest wall:  No tenderness  No Accessory muscle use. Heart:   Regular  rhythm,  No  murmur   No edema  Abdomen:   Soft, non-tender. Not distended. Bowel sounds normal; flank tenderness on right. Extremities: No cyanosis. No clubbing,      Skin turgor normal, Capillary refill normal, Radial dial pulse 2+  Skin:     Not pale. Not Jaundiced  No rashes   Psych:  Good insight. Not depressed. Not anxious or agitated. Neurologic: EOMs intact. No facial asymmetry. No aphasia or slurred speech. Symmetrical strength, Sensation grossly intact.  Alert and oriented X 4.     _______________________________________________________________________  Care Plan discussed with:    Comments   Patient x    Family      RN     Care Manager                    Consultant:      _______________________________________________________________________  Expected  Disposition:   Home with Family x   HH/PT/OT/RN    SNF/LTC    KYUNG    ________________________________________________________________________  TOTAL TIME:  39 Minutes    Critical Care Provided     Minutes non procedure based      Comments    x Reviewed previous records   >50% of visit spent in counseling and coordination of care x Discussion with patient and/or family and questions answered       ________________________________________________________________________      Procedures: see electronic medical records for all procedures/Xrays and details which were not copied into this note but were reviewed prior to creation of Plan. LAB DATA REVIEWED:    Recent Results (from the past 24 hour(s))   URINALYSIS W/ RFLX MICROSCOPIC    Collection Time: 02/10/18  5:30 PM   Result Value Ref Range    Color YELLOW      Appearance CLEAR      Specific gravity 1.025 1.005 - 1.030      pH (UA) 5.5 5.0 - 8.0      Protein NEGATIVE  NEG mg/dL    Glucose NEGATIVE  NEG mg/dL    Ketone NEGATIVE  NEG mg/dL    Bilirubin NEGATIVE  NEG      Blood MODERATE (A) NEG      Urobilinogen 1.0 0.2 - 1.0 EU/dL    Nitrites NEGATIVE  NEG      Leukocyte Esterase NEGATIVE  NEG     URINE MICROSCOPIC ONLY    Collection Time: 02/10/18  5:30 PM   Result Value Ref Range    WBC 0 to 3 0 - 4 /hpf    RBC 3 to 5 0 - 5 /hpf    Epithelial cells FEW 0 - 5 /lpf    Bacteria NEGATIVE  NEG /hpf    CA Oxalate crystals FEW (A) NEG     CBC WITH AUTOMATED DIFF    Collection Time: 02/10/18  6:00 PM   Result Value Ref Range    WBC 8.0 4.6 - 13.2 K/uL    RBC 4.37 (L) 4.70 - 5.50 M/uL    HGB 12.7 (L) 13.0 - 16.0 g/dL    HCT 36.7 36.0 - 48.0 %    MCV 84.0 74.0 - 97.0 FL    MCH 29.1 24.0 - 34.0 PG    MCHC 34.6 31.0 - 37.0 g/dL    RDW 12.9 11.6 - 14.5 %    PLATELET 096 906 - 075 K/uL    MPV 10.4 9.2 - 11.8 FL    NEUTROPHILS 82 (H) 42 - 75 %    LYMPHOCYTES 8 (L) 20 - 51 %    MONOCYTES 6 2 - 9 %    EOSINOPHILS 2 0 - 5 %    BASOPHILS 0 0 - 3 %    OTHER CELL 2 (H) 0      ABS. NEUTROPHILS 6.6 1.8 - 8.0 K/UL    ABS. LYMPHOCYTES 0.6 (L) 0.8 - 3.5 K/UL    ABS. MONOCYTES 0.5 0 - 1.0 K/UL    ABS. EOSINOPHILS 0.2 0.0 - 0.4 K/UL    ABS.  BASOPHILS 0.0 0.0 - 0.06 K/UL    DF MANUAL      PLATELET COMMENTS ADEQUATE PLATELETS      RBC COMMENTS NORMOCYTIC, NORMOCHROMIC     METABOLIC PANEL, BASIC    Collection Time: 02/10/18  6:00 PM   Result Value Ref Range    Sodium 139 136 - 145 mmol/L    Potassium 4.0 3.5 - 5.5 mmol/L    Chloride 106 100 - 108 mmol/L    CO2 25 21 - 32 mmol/L    Anion gap 8 3.0 - 18 mmol/L    Glucose 103 (H) 74 - 99 mg/dL    BUN 17 7.0 - 18 MG/DL    Creatinine 1.92 (H) 0.6 - 1.3 MG/DL    BUN/Creatinine ratio 9 (L) 12 - 20      GFR est AA 46 (L) >60 ml/min/1.73m2    GFR est non-AA 38 (L) >60 ml/min/1.73m2    Calcium 8.4 (L) 8.5 - 10.1 MG/DL       Claudette Rosario MD  Internal Medicine  Hospitalist Division

## 2018-02-11 NOTE — PROGRESS NOTES
Problem: Falls - Risk of  Goal: *Absence of Falls  Document Kamilla Fall Risk and appropriate interventions in the flowsheet.    Outcome: Progressing Towards Goal  Fall Risk Interventions:

## 2018-02-11 NOTE — ROUTINE PROCESS
Bedside shift report given to Liat Lopez, RN on coming nurse by Nikita Brown RN off going nurse including SBAR, KARDEX and [facility]

## 2018-02-11 NOTE — CONSULTS
No chief complaint on file. HISTORY OF PRESENT ILLNESS:  Christine Oconnor is a 49 y.o. male who is seen today in consult for Dr. Antony Gross because of an obstructing upper ureteral stone on the right. He actually presented to the emergency room at [facility] on February 6 with right flank pain and a CT scan at that time she had a 6 mm stone just at the UPJ or slightly below. There was no significant extravasation and you could see dye in the ureter below this. He was given pain medicine and instructed to return if he had any further problems. 2 days later he began having further problems and for the next 2 days which would be the eighth and ninth he continued to have pain until it became so severe that he presented back to the emergency room and is admitted for further discussion and treatment options. He has never had a stone before and he is not sure of anybody in the family has had kidney stones. ROS all documented on the chart  No past medical history on file. No past surgical history on file. Social History   Substance Use Topics    Smoking status: Not on file    Smokeless tobacco: Not on file    Alcohol use Not on file       Allergies   Allergen Reactions    Codeine Other (comments)     Pt. States \"I throw up\". No family history on file.     Current Facility-Administered Medications   Medication Dose Route Frequency Provider Last Rate Last Dose    famotidine (PEPCID) tablet 20 mg  20 mg Oral DAILY Hesham Perla MD        polyethylene glycol (MIRALAX) packet 17 g  17 g Oral DAILY Jadon Blankenship MD        senna-docusate (PERICOLACE) 8.6-50 mg per tablet 1 Tab  1 Tab Oral DAILY Jadon Blankenship MD        0.9% sodium chloride infusion  75 mL/hr IntraVENous CONTINUOUS Jadon Blankenship  mL/hr at 02/10/18 1953 150 mL/hr at 02/10/18 1953    acetaminophen (TYLENOL) tablet 650 mg  650 mg Oral Q4H PRN Linda Maria MD        HYDROcodone-acetaminophen [facility]) 5-325 mg per tablet 1 Tab  1 Tab Oral Q4H PRN Ramin De La Rosa MD        ondansetron [facility]) injection 4 mg  4 mg IntraVENous Q4H PRN Ramin De La Rosa MD   4 mg at 02/11/18 0530    magnesium hydroxide (MILK OF MAGNESIA) 400 mg/5 mL oral suspension 30 mL  30 mL Oral DAILY PRN Ramin De La Rosa MD   30 mL at 02/11/18 0530    heparin (porcine) injection 5,000 Units  5,000 Units SubCUTAneous Q8H Ramin De La Rosa MD   5,000 Units at 02/11/18 0522    influenza vaccine 2017-18 (3 yrs+)(PF) (FLUZONE QUAD/FLUARIX QUAD) injection 0.5 mL  0.5 mL IntraMUSCular PRIOR TO DISCHARGE Ramin De La Rosa MD   Stopped at 02/10/18 2213         PHYSICAL EXAMINATION:   Visit Vitals    /75 (BP 1 Location: Left arm, BP Patient Position: At rest)    Pulse 70    Temp 97.9 °F (36.6 °C)    Resp 20    Wt 177 lb 12.8 oz (80.6 kg)    SpO2 98%    BMI 25.51 kg/m2     Constitutional: WDWN, Pleasant and appropriate affect, No acute distress. He is not having any renal colic and is resting quietly in bed. CV:  No peripheral swelling noted  Respiratory: No respiratory distress or difficulties  Abdomen:  No abdominal masses or tenderness. No CVA tenderness. No inguinal hernias noted.  Male:    Deferred today. Skin: No jaundice. Neuro/Psych:  Alert and oriented x 3, affect appropriate. Lymphatic:   No enlarged inguinal lymph nodes.          Results for orders placed or performed during the hospital encounter of 02/10/18   CULTURE, URINE   Result Value Ref Range    Special Requests: NO SPECIAL REQUESTS      Culture result: NO GROWTH AFTER 11 HOURS     CBC WITH AUTOMATED DIFF   Result Value Ref Range    WBC 8.0 4.6 - 13.2 K/uL    RBC 4.37 (L) 4.70 - 5.50 M/uL    HGB 12.7 (L) 13.0 - 16.0 g/dL    HCT 36.7 36.0 - 48.0 %    MCV 84.0 74.0 - 97.0 FL    MCH 29.1 24.0 - 34.0 PG    MCHC 34.6 31.0 - 37.0 g/dL    RDW 12.9 11.6 - 14.5 %    PLATELET 416 768 - 070 K/uL    MPV 10.4 9.2 - 11.8 FL    NEUTROPHILS 82 (H) 42 - 75 %    LYMPHOCYTES 8 (L) 20 - 51 % MONOCYTES 6 2 - 9 %    EOSINOPHILS 2 0 - 5 %    BASOPHILS 0 0 - 3 %    OTHER CELL 2 (H) 0      ABS. NEUTROPHILS 6.6 1.8 - 8.0 K/UL    ABS. LYMPHOCYTES 0.6 (L) 0.8 - 3.5 K/UL    ABS. MONOCYTES 0.5 0 - 1.0 K/UL    ABS. EOSINOPHILS 0.2 0.0 - 0.4 K/UL    ABS.  BASOPHILS 0.0 0.0 - 0.06 K/UL    DF MANUAL      PLATELET COMMENTS ADEQUATE PLATELETS      RBC COMMENTS NORMOCYTIC, NORMOCHROMIC     METABOLIC PANEL, BASIC   Result Value Ref Range    Sodium 139 136 - 145 mmol/L    Potassium 4.0 3.5 - 5.5 mmol/L    Chloride 106 100 - 108 mmol/L    CO2 25 21 - 32 mmol/L    Anion gap 8 3.0 - 18 mmol/L    Glucose 103 (H) 74 - 99 mg/dL    BUN 17 7.0 - 18 MG/DL    Creatinine 1.92 (H) 0.6 - 1.3 MG/DL    BUN/Creatinine ratio 9 (L) 12 - 20      GFR est AA 46 (L) >60 ml/min/1.73m2    GFR est non-AA 38 (L) >60 ml/min/1.73m2    Calcium 8.4 (L) 8.5 - 10.1 MG/DL   URINALYSIS W/ RFLX MICROSCOPIC   Result Value Ref Range    Color YELLOW      Appearance CLEAR      Specific gravity 1.025 1.005 - 1.030      pH (UA) 5.5 5.0 - 8.0      Protein NEGATIVE  NEG mg/dL    Glucose NEGATIVE  NEG mg/dL    Ketone NEGATIVE  NEG mg/dL    Bilirubin NEGATIVE  NEG      Blood MODERATE (A) NEG      Urobilinogen 1.0 0.2 - 1.0 EU/dL    Nitrites NEGATIVE  NEG      Leukocyte Esterase NEGATIVE  NEG     URINE MICROSCOPIC ONLY   Result Value Ref Range    WBC 0 to 3 0 - 4 /hpf    RBC 3 to 5 0 - 5 /hpf    Epithelial cells FEW 0 - 5 /lpf    Bacteria NEGATIVE  NEG /hpf    CA Oxalate crystals FEW (A) NEG     METABOLIC PANEL, BASIC   Result Value Ref Range    Sodium 140 136 - 145 mmol/L    Potassium 3.6 3.5 - 5.5 mmol/L    Chloride 109 (H) 100 - 108 mmol/L    CO2 26 21 - 32 mmol/L    Anion gap 5 3.0 - 18 mmol/L    Glucose 103 (H) 74 - 99 mg/dL    BUN 17 7.0 - 18 MG/DL    Creatinine 1.83 (H) 0.6 - 1.3 MG/DL    BUN/Creatinine ratio 9 (L) 12 - 20      GFR est AA 48 (L) >60 ml/min/1.73m2    GFR est non-AA 40 (L) >60 ml/min/1.73m2    Calcium 7.3 (L) 8.5 - 10.1 MG/DL         REVIEW OF LABS AND IMAGING:     Imaging Report Reviewed? YES     Images Reviewed? YES           Other Lab Data Reviewed? YES         ASSESSMENT:     ICD-10-CM ICD-9-CM    1. Right ureteral stone N20.1 592.1    2. Kidney stone N20.0 592.0 oxyCODONE-acetaminophen (PERCOCET) 5-325 mg per tablet            PLAN / DISCUSSION: : I have reviewed all the films and this stone is indeed at the UPJ on the right. This is a borderline stone in the past and he and I discussed the options of either going ahead with cystoscopy and insertion of double-J stent and stone manipulation back into the kidney in preparation for an ESWL at a later date (or going ahead with the laser lithotripsy if the stone has dropped down into the distal ureter). After our discussion, he would like to try one more time to pass this on his own. I told him we could send him home with some more medication on this occasion but in the future he would have to have something definitive done to his stone. I discussed this with Dr. Kaveh Agustin and he will go ahead and discharge Mr. Warren Rose unless he has a lot of pain tonight. I would like to see him back in the office in about 2-3 weeks. The patient expresses understanding and agreement of the discussion and plan. Mila Coulter MD on 2/11/2018         Please note: This document has been produced using voice recognition software. Unrecognized errors in transcription may be present.

## 2018-02-12 ENCOUNTER — ANESTHESIA (OUTPATIENT)
Dept: SURGERY | Age: 48
End: 2018-02-12
Payer: COMMERCIAL

## 2018-02-12 ENCOUNTER — APPOINTMENT (OUTPATIENT)
Dept: GENERAL RADIOLOGY | Age: 48
End: 2018-02-12
Attending: UROLOGY
Payer: COMMERCIAL

## 2018-02-12 ENCOUNTER — ANESTHESIA EVENT (OUTPATIENT)
Dept: SURGERY | Age: 48
End: 2018-02-12
Payer: COMMERCIAL

## 2018-02-12 VITALS
HEART RATE: 62 BPM | OXYGEN SATURATION: 97 % | SYSTOLIC BLOOD PRESSURE: 128 MMHG | BODY MASS INDEX: 26.1 KG/M2 | TEMPERATURE: 97.5 F | DIASTOLIC BLOOD PRESSURE: 86 MMHG | RESPIRATION RATE: 20 BRPM | WEIGHT: 181.9 LBS

## 2018-02-12 LAB
ANION GAP SERPL CALC-SCNC: 4 MMOL/L (ref 3–18)
BACTERIA SPEC CULT: NORMAL
BUN SERPL-MCNC: 17 MG/DL (ref 7–18)
BUN/CREAT SERPL: 9 (ref 12–20)
CALCIUM SERPL-MCNC: 7.9 MG/DL (ref 8.5–10.1)
CHLORIDE SERPL-SCNC: 111 MMOL/L (ref 100–108)
CO2 SERPL-SCNC: 26 MMOL/L (ref 21–32)
CREAT SERPL-MCNC: 1.91 MG/DL (ref 0.6–1.3)
GLUCOSE SERPL-MCNC: 97 MG/DL (ref 74–99)
POTASSIUM SERPL-SCNC: 4.4 MMOL/L (ref 3.5–5.5)
SERVICE CMNT-IMP: NORMAL
SODIUM SERPL-SCNC: 141 MMOL/L (ref 136–145)

## 2018-02-12 PROCEDURE — 74011250637 HC RX REV CODE- 250/637: Performed by: INTERNAL MEDICINE

## 2018-02-12 PROCEDURE — 36415 COLL VENOUS BLD VENIPUNCTURE: CPT | Performed by: INTERNAL MEDICINE

## 2018-02-12 PROCEDURE — 74011000250 HC RX REV CODE- 250

## 2018-02-12 PROCEDURE — 96361 HYDRATE IV INFUSION ADD-ON: CPT

## 2018-02-12 PROCEDURE — 96372 THER/PROPH/DIAG INJ SC/IM: CPT

## 2018-02-12 PROCEDURE — 77030020782 HC GWN BAIR PAWS FLX 3M -B: Performed by: UROLOGY

## 2018-02-12 PROCEDURE — 77030018832 HC SOL IRR H20 ICUM -A: Performed by: UROLOGY

## 2018-02-12 PROCEDURE — C1769 GUIDE WIRE: HCPCS | Performed by: UROLOGY

## 2018-02-12 PROCEDURE — 99218 HC RM OBSERVATION: CPT

## 2018-02-12 PROCEDURE — 80048 BASIC METABOLIC PNL TOTAL CA: CPT | Performed by: INTERNAL MEDICINE

## 2018-02-12 PROCEDURE — 74011250636 HC RX REV CODE- 250/636: Performed by: INTERNAL MEDICINE

## 2018-02-12 PROCEDURE — C1751 CATH, INF, PER/CENT/MIDLINE: HCPCS | Performed by: UROLOGY

## 2018-02-12 PROCEDURE — 76010000160 HC OR TIME 0.5 TO 1 HR INTENSV-TIER 1: Performed by: UROLOGY

## 2018-02-12 PROCEDURE — 96375 TX/PRO/DX INJ NEW DRUG ADDON: CPT

## 2018-02-12 PROCEDURE — 77030019927 HC TBNG IRR CYSTO BAXT -A: Performed by: UROLOGY

## 2018-02-12 PROCEDURE — 74011250637 HC RX REV CODE- 250/637: Performed by: ANESTHESIOLOGY

## 2018-02-12 PROCEDURE — 77030018846 HC SOL IRR STRL H20 ICUM -A: Performed by: UROLOGY

## 2018-02-12 PROCEDURE — 74011250636 HC RX REV CODE- 250/636

## 2018-02-12 PROCEDURE — 74011636320 HC RX REV CODE- 636/320: Performed by: UROLOGY

## 2018-02-12 PROCEDURE — 74420 UROGRAPHY RTRGR +-KUB: CPT

## 2018-02-12 PROCEDURE — 76060000032 HC ANESTHESIA 0.5 TO 1 HR: Performed by: UROLOGY

## 2018-02-12 PROCEDURE — 74011250636 HC RX REV CODE- 250/636: Performed by: ANESTHESIOLOGY

## 2018-02-12 PROCEDURE — C2617 STENT, NON-COR, TEM W/O DEL: HCPCS | Performed by: UROLOGY

## 2018-02-12 PROCEDURE — 76210000006 HC OR PH I REC 0.5 TO 1 HR: Performed by: UROLOGY

## 2018-02-12 RX ORDER — KETOROLAC TROMETHAMINE 30 MG/ML
INJECTION, SOLUTION INTRAMUSCULAR; INTRAVENOUS AS NEEDED
Status: DISCONTINUED | OUTPATIENT
Start: 2018-02-12 | End: 2018-02-12 | Stop reason: HOSPADM

## 2018-02-12 RX ORDER — LIDOCAINE HYDROCHLORIDE 20 MG/ML
INJECTION, SOLUTION EPIDURAL; INFILTRATION; INTRACAUDAL; PERINEURAL AS NEEDED
Status: DISCONTINUED | OUTPATIENT
Start: 2018-02-12 | End: 2018-02-12 | Stop reason: HOSPADM

## 2018-02-12 RX ORDER — ONDANSETRON 2 MG/ML
INJECTION INTRAMUSCULAR; INTRAVENOUS AS NEEDED
Status: DISCONTINUED | OUTPATIENT
Start: 2018-02-12 | End: 2018-02-12 | Stop reason: HOSPADM

## 2018-02-12 RX ORDER — SODIUM CHLORIDE, SODIUM LACTATE, POTASSIUM CHLORIDE, CALCIUM CHLORIDE 600; 310; 30; 20 MG/100ML; MG/100ML; MG/100ML; MG/100ML
75 INJECTION, SOLUTION INTRAVENOUS CONTINUOUS
Status: DISCONTINUED | OUTPATIENT
Start: 2018-02-12 | End: 2018-02-12 | Stop reason: HOSPADM

## 2018-02-12 RX ORDER — HYDROCODONE BITARTRATE AND ACETAMINOPHEN 5; 300 MG/1; MG/1
1 TABLET ORAL
Qty: 10 TAB | Refills: 0 | Status: SHIPPED | OUTPATIENT
Start: 2018-02-12 | End: 2021-12-01

## 2018-02-12 RX ORDER — MIDAZOLAM HYDROCHLORIDE 1 MG/ML
INJECTION, SOLUTION INTRAMUSCULAR; INTRAVENOUS AS NEEDED
Status: DISCONTINUED | OUTPATIENT
Start: 2018-02-12 | End: 2018-02-12 | Stop reason: HOSPADM

## 2018-02-12 RX ORDER — FAMOTIDINE 20 MG/1
20 TABLET, FILM COATED ORAL ONCE
Status: COMPLETED | OUTPATIENT
Start: 2018-02-12 | End: 2018-02-12

## 2018-02-12 RX ORDER — SODIUM CHLORIDE 0.9 % (FLUSH) 0.9 %
5-10 SYRINGE (ML) INJECTION EVERY 8 HOURS
Status: DISCONTINUED | OUTPATIENT
Start: 2018-02-12 | End: 2018-02-12 | Stop reason: HOSPADM

## 2018-02-12 RX ORDER — HYDROMORPHONE HYDROCHLORIDE 1 MG/ML
1 INJECTION, SOLUTION INTRAMUSCULAR; INTRAVENOUS; SUBCUTANEOUS
Status: DISCONTINUED | OUTPATIENT
Start: 2018-02-12 | End: 2018-02-13 | Stop reason: HOSPADM

## 2018-02-12 RX ORDER — DIPHENHYDRAMINE HYDROCHLORIDE 50 MG/ML
12.5 INJECTION, SOLUTION INTRAMUSCULAR; INTRAVENOUS
Status: DISCONTINUED | OUTPATIENT
Start: 2018-02-12 | End: 2018-02-12 | Stop reason: HOSPADM

## 2018-02-12 RX ORDER — PROPOFOL 10 MG/ML
INJECTION, EMULSION INTRAVENOUS AS NEEDED
Status: DISCONTINUED | OUTPATIENT
Start: 2018-02-12 | End: 2018-02-12 | Stop reason: HOSPADM

## 2018-02-12 RX ORDER — CEFAZOLIN SODIUM 2 G/50ML
2 SOLUTION INTRAVENOUS ONCE
Status: DISCONTINUED | OUTPATIENT
Start: 2018-02-12 | End: 2018-02-13 | Stop reason: HOSPADM

## 2018-02-12 RX ORDER — FENTANYL CITRATE 50 UG/ML
50 INJECTION, SOLUTION INTRAMUSCULAR; INTRAVENOUS
Status: DISCONTINUED | OUTPATIENT
Start: 2018-02-12 | End: 2018-02-12 | Stop reason: HOSPADM

## 2018-02-12 RX ORDER — SODIUM CHLORIDE 0.9 % (FLUSH) 0.9 %
5-10 SYRINGE (ML) INJECTION AS NEEDED
Status: DISCONTINUED | OUTPATIENT
Start: 2018-02-12 | End: 2018-02-12 | Stop reason: HOSPADM

## 2018-02-12 RX ORDER — CEFAZOLIN SODIUM 1 G/3ML
INJECTION, POWDER, FOR SOLUTION INTRAMUSCULAR; INTRAVENOUS AS NEEDED
Status: DISCONTINUED | OUTPATIENT
Start: 2018-02-12 | End: 2018-02-12 | Stop reason: HOSPADM

## 2018-02-12 RX ORDER — HYDROMORPHONE HYDROCHLORIDE 1 MG/ML
0.5 INJECTION, SOLUTION INTRAMUSCULAR; INTRAVENOUS; SUBCUTANEOUS
Status: DISCONTINUED | OUTPATIENT
Start: 2018-02-12 | End: 2018-02-12 | Stop reason: SDUPTHER

## 2018-02-12 RX ORDER — POLYETHYLENE GLYCOL 3350 17 G/17G
17 POWDER, FOR SOLUTION ORAL DAILY
Qty: 10 PACKET | Refills: 0 | Status: SHIPPED | OUTPATIENT
Start: 2018-02-13 | End: 2018-02-19

## 2018-02-12 RX ORDER — SODIUM CHLORIDE, SODIUM LACTATE, POTASSIUM CHLORIDE, CALCIUM CHLORIDE 600; 310; 30; 20 MG/100ML; MG/100ML; MG/100ML; MG/100ML
125 INJECTION, SOLUTION INTRAVENOUS CONTINUOUS
Status: DISCONTINUED | OUTPATIENT
Start: 2018-02-12 | End: 2018-02-12 | Stop reason: HOSPADM

## 2018-02-12 RX ORDER — ONDANSETRON 2 MG/ML
4 INJECTION INTRAMUSCULAR; INTRAVENOUS ONCE
Status: DISCONTINUED | OUTPATIENT
Start: 2018-02-12 | End: 2018-02-12 | Stop reason: HOSPADM

## 2018-02-12 RX ORDER — CEPHALEXIN 250 MG/1
250 CAPSULE ORAL 2 TIMES DAILY
Qty: 10 CAP | Refills: 0 | Status: SHIPPED | OUTPATIENT
Start: 2018-02-12 | End: 2018-02-17

## 2018-02-12 RX ORDER — FENTANYL CITRATE 50 UG/ML
INJECTION, SOLUTION INTRAMUSCULAR; INTRAVENOUS AS NEEDED
Status: DISCONTINUED | OUTPATIENT
Start: 2018-02-12 | End: 2018-02-12 | Stop reason: HOSPADM

## 2018-02-12 RX ADMIN — LIDOCAINE HYDROCHLORIDE 100 MG: 20 INJECTION, SOLUTION EPIDURAL; INFILTRATION; INTRACAUDAL; PERINEURAL at 17:28

## 2018-02-12 RX ADMIN — MIDAZOLAM HYDROCHLORIDE 2 MG: 1 INJECTION, SOLUTION INTRAMUSCULAR; INTRAVENOUS at 17:21

## 2018-02-12 RX ADMIN — FENTANYL CITRATE 50 MCG: 50 INJECTION, SOLUTION INTRAMUSCULAR; INTRAVENOUS at 17:26

## 2018-02-12 RX ADMIN — KETOROLAC TROMETHAMINE 30 MG: 30 INJECTION, SOLUTION INTRAMUSCULAR; INTRAVENOUS at 17:44

## 2018-02-12 RX ADMIN — SODIUM CHLORIDE, SODIUM LACTATE, POTASSIUM CHLORIDE, AND CALCIUM CHLORIDE 75 ML/HR: 600; 310; 30; 20 INJECTION, SOLUTION INTRAVENOUS at 16:02

## 2018-02-12 RX ADMIN — HYDROCODONE BITARTRATE AND ACETAMINOPHEN 1 TABLET: 5; 325 TABLET ORAL at 04:18

## 2018-02-12 RX ADMIN — SODIUM CHLORIDE 75 ML/HR: 900 INJECTION, SOLUTION INTRAVENOUS at 04:19

## 2018-02-12 RX ADMIN — MAGNESIUM HYDROXIDE 30 ML: 400 SUSPENSION ORAL at 09:06

## 2018-02-12 RX ADMIN — CEFAZOLIN SODIUM 2 G: 1 INJECTION, POWDER, FOR SOLUTION INTRAMUSCULAR; INTRAVENOUS at 17:31

## 2018-02-12 RX ADMIN — HYDROCODONE BITARTRATE AND ACETAMINOPHEN 1 TABLET: 5; 325 TABLET ORAL at 09:05

## 2018-02-12 RX ADMIN — HYDROMORPHONE HYDROCHLORIDE 1 MG: 1 INJECTION, SOLUTION INTRAMUSCULAR; INTRAVENOUS; SUBCUTANEOUS at 13:21

## 2018-02-12 RX ADMIN — HEPARIN SODIUM 5000 UNITS: 5000 INJECTION, SOLUTION INTRAVENOUS; SUBCUTANEOUS at 05:30

## 2018-02-12 RX ADMIN — PROPOFOL 150 MG: 10 INJECTION, EMULSION INTRAVENOUS at 17:28

## 2018-02-12 RX ADMIN — STANDARDIZED SENNA CONCENTRATE AND DOCUSATE SODIUM 1 TABLET: 8.6; 5 TABLET, FILM COATED ORAL at 09:05

## 2018-02-12 RX ADMIN — ONDANSETRON 4 MG: 2 INJECTION INTRAMUSCULAR; INTRAVENOUS at 17:44

## 2018-02-12 RX ADMIN — POLYETHYLENE GLYCOL 3350 17 G: 17 POWDER, FOR SOLUTION ORAL at 09:06

## 2018-02-12 RX ADMIN — FAMOTIDINE 20 MG: 20 TABLET, FILM COATED ORAL at 09:05

## 2018-02-12 RX ADMIN — FAMOTIDINE 20 MG: 20 TABLET, FILM COATED ORAL at 16:02

## 2018-02-12 NOTE — DISCHARGE INSTRUCTIONS
Kidney Stone: Care Instructions  Your Care Instructions    Kidney stones are formed when salts, minerals, and other substances normally found in the urine clump together. They can be as small as grains of sand or, rarely, as large as golf balls. While the stone is traveling through the ureter, which is the tube that carries urine from the kidney to the bladder, you will probably feel pain. The pain may be mild or very severe. You may also have some blood in your urine. As soon as the stone reaches the bladder, any intense pain should go away. If a stone is too large to pass on its own, you may need a medical procedure to help you pass the stone. The doctor has checked you carefully, but problems can develop later. If you notice any problems or new symptoms, get medical treatment right away. Follow-up care is a key part of your treatment and safety. Be sure to make and go to all appointments, and call your doctor if you are having problems. It's also a good idea to know your test results and keep a list of the medicines you take. How can you care for yourself at home? · Drink plenty of fluids, enough so that your urine is light yellow or clear like water. If you have kidney, heart, or liver disease and have to limit fluids, talk with your doctor before you increase the amount of fluids you drink. · Take pain medicines exactly as directed. Call your doctor if you think you are having a problem with your medicine. ¨ If the doctor gave you a prescription medicine for pain, take it as prescribed. ¨ If you are not taking a prescription pain medicine, ask your doctor if you can take an over-the-counter medicine. Read and follow all instructions on the label. · Your doctor may ask you to strain your urine so that you can collect your kidney stone when it passes. You can use a kitchen strainer or a tea strainer to catch the stone. Store it in a plastic bag until you see your doctor again.   Preventing future kidney stones  Some changes in your diet may help prevent kidney stones. Depending on the cause of your stones, your doctor may recommend that you:  · Drink plenty of fluids, enough so that your urine is light yellow or clear like water. If you have kidney, heart, or liver disease and have to limit fluids, talk with your doctor before you increase the amount of fluids you drink. · Limit coffee, tea, and alcohol. Also avoid grapefruit juice. · Do not take more than the recommended daily dose of vitamins C and D.  · Avoid antacids such as Gaviscon, Maalox, Mylanta, or Tums. · Limit the amount of salt (sodium) in your diet. · Eat a balanced diet that is not too high in protein. · Limit foods that are high in a substance called oxalate, which can cause kidney stones. These foods include dark green vegetables, rhubarb, chocolate, wheat bran, nuts, cranberries, and beans. When should you call for help? Call your doctor now or seek immediate medical care if:  ? · You cannot keep down fluids. ? · Your pain gets worse. ? · You have a fever or chills. ? · You have new or worse pain in your back just below your rib cage (the flank area). ? · You have new or more blood in your urine. ? Watch closely for changes in your health, and be sure to contact your doctor if:  ? · You do not get better as expected. Where can you learn more? Go to http://marcella-jaqui.info/. Enter S183 in the search box to learn more about \"Kidney Stone: Care Instructions. \"  Current as of: May 12, 2017  Content Version: 11.4  © 2092-2945 [employer]. Care instructions adapted under license by [employer] (which disclaims liability or warranty for this information). If you have questions about a medical condition or this instruction, always ask your healthcare professional. [address] any warranty or liability for your use of this information.          Learning About Diet for Kidney Stone Prevention  What are kidney stones? Kidney stones are made of salts and minerals in the urine that form small \"medhat. \" Stones can form in the kidneys and the ureters (the tubes that lead from the kidneys to the bladder). They can also form in the bladder. Stones may not cause a problem as long as they stay in the kidneys. But they can cause sudden, severe pain. Pain is most likely when the stones travel from the kidneys to the bladder. Kidney stones can cause bloody urine. Kidney stones often run in families. You are more likely to get them if you don't drink enough fluids, mainly water. Certain foods and drinks and some dietary supplements may also increase your risk for kidney stones if you consume too much of them. What can you do to prevent kidney stones? Changing what you eat may not prevent all types of kidney stones. But for people who have a history of certain kinds of kidney stones, some changes in diet may help. A dietitian can help you set up a meal plan that includes healthy, low-oxalate choices. Here are some general guidelines to get you started. Plan your meals and snacks around foods that are low in oxalate. These foods include:  · Corn, kale, parsnips, and squash,. · Beef, chicken, pork, turkey, and fish. · Milk, butter, cheese, and yogurt. You can eat certain foods that are medium-high in oxalate, but eat them only once in a while. These foods include:  · Bread. · Brown rice. · English muffins. · Figs. · Popcorn. · String beans. · Tomatoes. Limit very high-oxalate foods, including:  · Black tea. · Coffee. · Chocolate. · Dark green vegetables. · Nuts. Here are some other things you can do to help prevent kidney stones. · Drink plenty of fluids. If you have kidney, heart, or liver disease and have to limit fluids, talk with your doctor before you increase the amount of fluids you drink.   · Do not take more than the recommended daily dose of vitamins C and D.  · Limit the salt in your diet. · Eat a balanced diet that is not too high in protein. Follow-up care is a key part of your treatment and safety. Be sure to make and go to all appointments, and call your doctor if you are having problems. It's also a good idea to know your test results and keep a list of the medicines you take. Where can you learn more? Go to http://marcella-jaqui.info/. Enter C138 in the search box to learn more about \"Learning About Diet for Kidney Stone Prevention. \"  Current as of: May 12, 2017  Content Version: 11.4  © 5653-5268 [employer]. Care instructions adapted under license by [employer] (which disclaims liability or warranty for this information). If you have questions about a medical condition or this instruction, always ask your healthcare professional. [address] any warranty or liability for your use of this information. DISCHARGE SUMMARY from Nurse    PATIENT INSTRUCTIONS:    After general anesthesia or intravenous sedation, for 24 hours or while taking prescription Narcotics:  · Limit your activities  · Do not drive and operate hazardous machinery  · Do not make important personal or business decisions  · Do  not drink alcoholic beverages  · If you have not urinated within 8 hours after discharge, please contact your surgeon on call. Report the following to your surgeon:  · Excessive pain, swelling, redness or odor of or around the surgical area  · Temperature over 100.5  · Nausea and vomiting lasting longer than 4 hours or if unable to take medications  · Any signs of decreased circulation or nerve impairment to extremity: change in color, persistent  numbness, tingling, coldness or increase pain  · Any questions    What to do at Home:  Recommended activity: Activity as tolerated and no driving for today and No driving while on analgesics.     If you experience any of the following symptoms chest pain, shortness of breath, severe pain, temp > 101 please follow up with Emergency Department. If passing blood clots through urine, call urologist office. *  Please give a list of your current medications to your Primary Care Provider. *  Please update this list whenever your medications are discontinued, doses are      changed, or new medications (including over-the-counter products) are added. *  Please carry medication information at all times in case of emergency situations. These are general instructions for a healthy lifestyle:    No smoking/ No tobacco products/ Avoid exposure to second hand smoke  Surgeon General's Warning:  Quitting smoking now greatly reduces serious risk to your health. Obesity, smoking, and sedentary lifestyle greatly increases your risk for illness    A healthy diet, regular physical exercise & weight monitoring are important for maintaining a healthy lifestyle    You may be retaining fluid if you have a history of heart failure or if you experience any of the following symptoms:  Weight gain of 3 pounds or more overnight or 5 pounds in a week, increased swelling in our hands or feet or shortness of breath while lying flat in bed. Please call your doctor as soon as you notice any of these symptoms; do not wait until your next office visit. Recognize signs and symptoms of STROKE:    F-face looks uneven    A-arms unable to move or move unevenly    S-speech slurred or non-existent    T-time-call 911 as soon as signs and symptoms begin-DO NOT go       Back to bed or wait to see if you get better-TIME IS BRAIN. Warning Signs of HEART ATTACK     Call 911 if you have these symptoms:   Chest discomfort. Most heart attacks involve discomfort in the center of the chest that lasts more than a few minutes, or that goes away and comes back. It can feel like uncomfortable pressure, squeezing, fullness, or pain.  Discomfort in other areas of the upper body.  Symptoms can include pain or discomfort in one or both arms, the back, neck, jaw, or stomach.  Shortness of breath with or without chest discomfort.  Other signs may include breaking out in a cold sweat, nausea, or lightheadedness. Don't wait more than five minutes to call 911 - MINUTES MATTER! Fast action can save your life. Calling 911 is almost always the fastest way to get lifesaving treatment. Emergency Medical Services staff can begin treatment when they arrive -- up to an hour sooner than if someone gets to the hospital by car. The discharge information has been reviewed with the patient. The patient verbalized understanding. Discharge medications reviewed with the patient and appropriate educational materials and side effects teaching were provided.     Patient armband removed and shredded    ___________________________________________________________________________________________________________________________________

## 2018-02-12 NOTE — ANESTHESIA POSTPROCEDURE EVALUATION
Post-Anesthesia Evaluation and Assessment    Patient: Elyssa Boo MRN: [id number]  SSN: [id number]    YOB: 1970  Age: 49 y.o. Sex: male     VS from flow sheet    Cardiovascular Function/Vital Signs  Visit Vitals    /72    Pulse 70    Temp 36.7 °C (98 °F)    Resp 15    Wt 82.5 kg (181 lb 14.4 oz)    SpO2 94%    BMI 26.1 kg/m2       Patient is status post general anesthesia for Procedure(s):  CYSTOSCOPY URETEROSCOPY/RETROGRADES/ STENTS/ POSSIBLE HOLMIUM LASER. Nausea/Vomiting: None    Postoperative hydration reviewed and adequate. Pain:  Pain Scale 1: Numeric (0 - 10) (02/12/18 1831)  Pain Intensity 1: 0 (02/12/18 1831)   Managed    Neurological Status:   Neuro (WDL): Within Defined Limits (02/12/18 1801)   At baseline    Mental Status and Level of Consciousness: Arousable    Pulmonary Status:   O2 Device: Room air (02/12/18 1807)   Adequate oxygenation and airway patent    Complications related to anesthesia: None    Post-anesthesia assessment completed.  No concerns    Signed By: Mel Riley MD     February 12, 2018

## 2018-02-12 NOTE — PROGRESS NOTES
Problem: Falls - Risk of  Goal: *Absence of Falls  Document Kamilla Fall Risk and appropriate interventions in the flowsheet.    Outcome: Progressing Towards Goal  Fall Risk Interventions:            Medication Interventions: Assess postural VS orthostatic hypotension, Patient to call before getting OOB

## 2018-02-12 NOTE — PROGRESS NOTES
BREANNE NOTE:   SW met with the pt to gather/confirm information. Pt reported this is his first kidney stone attack and has been experiencing the pain since last Tues (2/6). Pt denied having an AD. He reported his level of function prior to this was very independent. He denied any DME. SW obtained signature on the Obs form. Copy provided to pt and org in pt chart. Care Management Interventions  PCP Verified by CM: Yes (Pt reported he does not have a PCP. He goes [facility] or [facility] ED)  Palliative Care Criteria Met (RRAT>21 & CHF Dx)?: No  Mode of Transport at Discharge: BLS  Discharge Durable Medical Equipment: No  Physical Therapy Consult: No  Occupational Therapy Consult: No  Speech Therapy Consult: No  Current Support Network: Lives with Spouse (Pt resides with his wife and two grandchildren (ages 10 & 6) in a one story home. )  Confirm Follow Up Transport: [facility] (Pt reported he will be able to get discharge transport. )  [employer] Provided?: No (Pt is not a )  Discharge Location  Discharge Placement: Home    SW will monitor and be available for discharge planning.      Rosalia Nowak, MSW LSW

## 2018-02-12 NOTE — PROGRESS NOTES
SUBJECTIVE:    Sitting up in chair. States he had a rough night due to pain. No nausea or vomiting. No SOB or cough. OBJECTIVE:    /75 (BP 1 Location: Left arm, BP Patient Position: Sitting)  Pulse 60  Temp 97.2 °F (36.2 °C)  Resp 20  Wt 82.5 kg (181 lb 14.4 oz)  SpO2 100%  BMI 26.1 kg/m2    CVS: RRR  RS: CTA bilaterally, no wheezes  GI: NT, ND, BS +  : No CVA tenderness  Extremities: no pedal edema  General: NAD, Awake    ASSESSMENT:    1. Right flank pain sec to Mild right hydronephrosis secondary to a 6 mm stone at the right UPJ.  2. JORGE versus stage 3 CKD  3. Constipation   4. Small hiatal hernia. PLAN:    Cont current management  Talked to dr. Daysi Barcenas and procedure later this afternoon  D/w patient and nurse - aware of plan.      CMP:   Lab Results   Component Value Date/Time     02/12/2018 04:27 AM    K 4.4 02/12/2018 04:27 AM     (H) 02/12/2018 04:27 AM    CO2 26 02/12/2018 04:27 AM    AGAP 4 02/12/2018 04:27 AM    GLU 97 02/12/2018 04:27 AM    BUN 17 02/12/2018 04:27 AM    CREA 1.91 (H) 02/12/2018 04:27 AM    GFRAA 46 (L) 02/12/2018 04:27 AM    GFRNA 38 (L) 02/12/2018 04:27 AM    CA 7.9 (L) 02/12/2018 04:27 AM     CBC:   No results found for: WBC, HGB, HGBEXT, HCT, HCTEXT, PLT, PLTEXT, HGBEXT, HCTEXT, PLTEXT

## 2018-02-12 NOTE — ANESTHESIA PREPROCEDURE EVALUATION
Anesthetic History   No history of anesthetic complications            Review of Systems / Medical History  Patient summary reviewed and pertinent labs reviewed    Pulmonary  Within defined limits                 Neuro/Psych   Within defined limits           Cardiovascular  Within defined limits                     GI/Hepatic/Renal         Renal disease: stones and ARF       Endo/Other             Other Findings   Comments: Documentation of current medication  Current medications obtained, documented and obtained? YES      Risk Factors for Postoperative nausea/vomiting:       History of postoperative nausea/vomiting? NO       Female? NO       Motion sickness? NO       Intended opioid administration for postoperative analgesia? YES      Smoking Abstinence:  Current Smoker? NO  Elective Surgery? YES  Seen preoperatively by anesthesiologist or proxy prior to day of surgery? YES  Pt abstained from smoking 24 hours prior to anesthesia?  N/A    Preventive care/screening for High Blood Pressure:  Aged 18 years and older: YES  Screened for high blood pressure: YES  Patients with high blood pressure referred to primary care provider   for BP management: YES               Physical Exam    Airway  Mallampati: II  TM Distance: 4 - 6 cm  Neck ROM: normal range of motion   Mouth opening: Normal     Cardiovascular    Rhythm: regular  Rate: normal         Dental    Dentition: Poor dentition     Pulmonary  Breath sounds clear to auscultation               Abdominal  GI exam deferred       Other Findings            Anesthetic Plan    ASA: 2  Anesthesia type: general          Induction: Intravenous  Anesthetic plan and risks discussed with: Patient

## 2018-02-12 NOTE — PROGRESS NOTES
0700--Bedside shift change report given to DOUGIE Sue RN (oncoming nurse) by Myles Linares RN (offgoing nurse). Report included the following information SBAR, Kardex, MAR and Recent Results. Pt sleeping at this time. 1600--Pt off floor to PACU. CHG bath given. 1900--Bedside shift change report given to Judyth Osler, RN (oncoming nurse) by Javier Frazier RN (offgoing nurse). Report included the following information SBAR, Kardex, MAR and Recent Results. 1920--Pt returned to floor from PACU. Report received from Nia Lares, [facility].

## 2018-02-12 NOTE — ROUTINE PROCESS
Bedside shift report given to Donna Degroot RN on coming nurse by Vasu García RN off going nurse including [facility], [facility] and [facility]

## 2018-02-13 NOTE — OP NOTES
[address] [place] [address]   OPERATIVE REPORT    Coleman Greer  MR#: [id number]  : 1970  ACCOUNT #: [de-identified]   DATE OF SERVICE: 2018    PREOPERATIVE DIAGNOSIS:  Right upper ureteral stone. POSTOPERATIVE DIAGNOSIS:  Right upper ureteral stone. OPERATION PERFORMED:  Cystoscopy, retrograde ureterogram and stone manipulation and insertion of double-J stent (6 x 26). SURGEON:  Ezekiel Kussmaul, MD    ASSISTANT:  None. COMPLICATIONS:  None. BLOOD LOSS:  None. SPECIMENS:  None. ANESTHESIA:  General.      IMPLANTS:  There was an implanted double-J stent. DESCRIPTION OF PROCEDURE:  Under  satisfactory anesthesia, the patient was prepped and draped in lithotomy position. Panendoscope was inserted. The urethra to the verumontanum was normal.  The prostate was normal for his age and showed no abnormalities. There were no stones or tumors in the bladder. There were a few loose blood clots in the bladder. Retrograde pyelography under cystoscopy showed the stone to still be impacted just below the UPJ in the upper ureter. I advanced a cone tipped catheter to that stone and with gentle manipulation and retrograde washings the stone went back into the renal pelvis. The kidney was drained of fluid and the stone still showed up as a filling defect. Following this, a guidewire was inserted into the kidney and over this a 6 x 26 double-J stent was placed, proximal curl in the kidney, distal curl in the bladder. He tolerated this well and was taken to the recovery room in stable condition.       MD CHIRS Escobar / TN  D: 2018 17:55     T: 2018 05:35  JOB #: [id number]

## 2018-02-13 NOTE — DISCHARGE SUMMARY
PATIENT DISCHARGE INSTRUCTIONS      PATIENT DISCHARGE INSTRUCTIONS    Ruiz Mcduffie / [id number] : 1970    Admitted 2/10/2018 Discharged: 2018     Dictated # [id number]    · It is important that you take the medication exactly as they are prescribed. · Keep your medication in the bottles provided by the pharmacist and keep a list of the medication names, dosages, and times to be taken in your wallet. · Do not take other medications without consulting your doctor. What to do at Home    Recommended Diet: Regular Diet    Recommended Activity: Activity as tolerated    Discharge Medication List as of 2018  6:56 PM      START taking these medications    Details   polyethylene glycol (MIRALAX) 17 gram packet Take 1 Packet by mouth daily. , Print, Disp-10 Packet, R-0      cephALEXin (KEFLEX) 250 mg capsule Take 1 Cap by mouth two (2) times a day for 10 doses. Indications: PREVENTION OF BACTERIAL URINARY TRACT INFECTION, Print, Disp-10 Cap, R-0      HYDROcodone-acetaminophen (XODOL) 5-300 mg tablet Take 1 Tab by mouth every four (4) hours as needed for up to 10 doses. Max Daily Amount: 6 Tabs., Print, Disp-10 Tab, R-0      ciprofloxacin HCl (CIPRO) 500 mg tablet Take 1 Tab by mouth two (2) times a day for 3 days. , Print, Disp-6 Tab, R-0         CONTINUE these medications which have CHANGED    Details   tamsulosin (FLOMAX) 0.4 mg capsule Take 1 Cap by mouth daily for 15 days. , Print, Disp-15 Cap, R-0         STOP taking these medications       oxyCODONE-acetaminophen (PERCOCET) 5-325 mg per tablet Comments:   Reason for Stopping:         oxyCODONE-acetaminophen (PERCOCET) 5-325 mg per tablet Comments:   Reason for Stopping:                 Signed By: Christian Man MD     2018

## 2018-02-13 NOTE — PROGRESS NOTES
Patient discharged instructions given to patient and caregiver. Patient and caregiver verbalize understanding. IV removed from right A/C. Patient has no apparent distress. Patient declines wheelchair.

## 2018-02-14 ENCOUNTER — ANESTHESIA EVENT (OUTPATIENT)
Dept: ENDOSCOPY | Age: 48
End: 2018-02-14
Payer: COMMERCIAL

## 2018-02-14 NOTE — DISCHARGE SUMMARY
[address] Rafal Causey  MR#: [id number]  : 1970  ACCOUNT #: [de-identified]   ADMIT DATE: 02/10/2018  DISCHARGE DATE: 2018    DISPOSITION:  Discharged to home. DISCHARGE CONDITION:  Stable. DISCHARGE DIAGNOSES:  1. Right flank pain secondary to #2.  2.  Right hydronephrosis secondary to 6 mm stone at the right UP junction. 3.  Acute renal failure versus stage III chronic kidney disease. 4.  Constipation, better. 5.  Small hiatal hernia. DISCHARGE MEDICATIONS:  1. Keflex 250 mg twice a day for 10 days. 2.  Norco/Xodol one tablet q.4 hours p.r.n. for pain. 3.  Flomax 0.4 mg daily. 4.  MiraLax 1 packet p.o. daily, hold it for diarrhea. IMAGING AND PROCEDURES:  1. Urine culture x1, negative. 2.  Cystoscopy, retrograde ureterogram, and stone manipulation with a double-J stent placement done by Dr. Angel Luis Mccormick on day of discharge. 3.  Consult to neurology by Dr. Angel Luis Mccormick. HOSPITAL COURSE:  The patient was admitted to the hospital on 02/10/2018 with a complaint of right flank pain. The patient had a CAT scan done as an outpatient, showed right hydronephrosis, acute renal failure, and right UPJ junction stone. Patient was continued on IV fluid, IV pain management, and also started on constipation treatment. Urine culture was negative. The patient was seen by Dr. Angel Luis Mccormick and took him for cystoscopy and insertion of double-J stent placement and retrograde ureterogram.  However, after the procedure, patient was discharged by Dr. Angel Luis Mccormick with the above-mentioned medications. I came to know about this on the next day; however, when I saw this patient, he was stable before the procedure. The patient will be followed by Dr. Angel Luis Mccormick outpatient for lithotripsy as scheduled as an outpatient. DISCHARGE INSTRUCTIONS:  1. Diet:  Regular diet. 2.  Activity:  As tolerated.   3.  Follow up with primary care physician or Dr. Angel Luis Mccormick as scheduled. Total time is greater than 35 minutes.       MD ALICIA Gallardo/JENN  D: 02/13/2018 10:35     T: 02/13/2018 22:40  JOB #: [id number]  CC: Consuelo Paige MD

## 2018-02-15 ENCOUNTER — ANESTHESIA (OUTPATIENT)
Dept: ENDOSCOPY | Age: 48
End: 2018-02-15
Payer: COMMERCIAL

## 2018-02-15 ENCOUNTER — HOSPITAL ENCOUNTER (OUTPATIENT)
Age: 48
Setting detail: OUTPATIENT SURGERY
Discharge: HOME | End: 2018-02-15
Attending: UROLOGY | Admitting: UROLOGY
Payer: COMMERCIAL

## 2018-02-15 VITALS
SYSTOLIC BLOOD PRESSURE: 133 MMHG | HEIGHT: 70 IN | TEMPERATURE: 97.9 F | DIASTOLIC BLOOD PRESSURE: 80 MMHG | RESPIRATION RATE: 13 BRPM | WEIGHT: 181 LBS | HEART RATE: 48 BPM | OXYGEN SATURATION: 100 % | BODY MASS INDEX: 25.91 KG/M2

## 2018-02-15 PROCEDURE — 76060000032 HC ANESTHESIA 0.5 TO 1 HR: Performed by: UROLOGY

## 2018-02-15 PROCEDURE — 74011000250 HC RX REV CODE- 250

## 2018-02-15 PROCEDURE — 74011250636 HC RX REV CODE- 250/636

## 2018-02-15 PROCEDURE — 50590 FRAGMENTING OF KIDNEY STONE: CPT | Performed by: UROLOGY

## 2018-02-15 PROCEDURE — 76210000021 HC REC RM PH II 0.5 TO 1 HR: Performed by: UROLOGY

## 2018-02-15 PROCEDURE — 74011000250 HC RX REV CODE- 250: Performed by: ANESTHESIOLOGY

## 2018-02-15 PROCEDURE — 74011250636 HC RX REV CODE- 250/636: Performed by: NURSE ANESTHETIST, CERTIFIED REGISTERED

## 2018-02-15 RX ORDER — SODIUM CHLORIDE 0.9 % (FLUSH) 0.9 %
5-10 SYRINGE (ML) INJECTION AS NEEDED
Status: CANCELLED | OUTPATIENT
Start: 2018-02-15

## 2018-02-15 RX ORDER — FAMOTIDINE 20 MG/1
20 TABLET, FILM COATED ORAL ONCE
Status: DISCONTINUED | OUTPATIENT
Start: 2018-02-15 | End: 2018-02-15 | Stop reason: HOSPADM

## 2018-02-15 RX ORDER — PROPOFOL 10 MG/ML
INJECTION, EMULSION INTRAVENOUS AS NEEDED
Status: DISCONTINUED | OUTPATIENT
Start: 2018-02-15 | End: 2018-02-15 | Stop reason: HOSPADM

## 2018-02-15 RX ORDER — NALOXONE HYDROCHLORIDE 0.4 MG/ML
0.1 INJECTION, SOLUTION INTRAMUSCULAR; INTRAVENOUS; SUBCUTANEOUS
Status: CANCELLED | OUTPATIENT
Start: 2018-02-15

## 2018-02-15 RX ORDER — LIDOCAINE HYDROCHLORIDE 20 MG/ML
INJECTION, SOLUTION EPIDURAL; INFILTRATION; INTRACAUDAL; PERINEURAL AS NEEDED
Status: DISCONTINUED | OUTPATIENT
Start: 2018-02-15 | End: 2018-02-15 | Stop reason: HOSPADM

## 2018-02-15 RX ORDER — INSULIN LISPRO 100 [IU]/ML
INJECTION, SOLUTION INTRAVENOUS; SUBCUTANEOUS ONCE
Status: DISCONTINUED | OUTPATIENT
Start: 2018-02-15 | End: 2018-02-15 | Stop reason: HOSPADM

## 2018-02-15 RX ORDER — SODIUM CHLORIDE, SODIUM LACTATE, POTASSIUM CHLORIDE, CALCIUM CHLORIDE 600; 310; 30; 20 MG/100ML; MG/100ML; MG/100ML; MG/100ML
50 INJECTION, SOLUTION INTRAVENOUS CONTINUOUS
Status: DISCONTINUED | OUTPATIENT
Start: 2018-02-15 | End: 2018-02-15 | Stop reason: HOSPADM

## 2018-02-15 RX ORDER — SODIUM CHLORIDE, SODIUM LACTATE, POTASSIUM CHLORIDE, CALCIUM CHLORIDE 600; 310; 30; 20 MG/100ML; MG/100ML; MG/100ML; MG/100ML
INJECTION, SOLUTION INTRAVENOUS
Status: DISCONTINUED | OUTPATIENT
Start: 2018-02-15 | End: 2018-02-15 | Stop reason: HOSPADM

## 2018-02-15 RX ORDER — FENTANYL CITRATE 50 UG/ML
50 INJECTION, SOLUTION INTRAMUSCULAR; INTRAVENOUS AS NEEDED
Status: CANCELLED | OUTPATIENT
Start: 2018-02-15

## 2018-02-15 RX ADMIN — PROPOFOL 20 MG: 10 INJECTION, EMULSION INTRAVENOUS at 09:26

## 2018-02-15 RX ADMIN — PROPOFOL 20 MG: 10 INJECTION, EMULSION INTRAVENOUS at 09:20

## 2018-02-15 RX ADMIN — PROPOFOL 20 MG: 10 INJECTION, EMULSION INTRAVENOUS at 09:22

## 2018-02-15 RX ADMIN — PROPOFOL 20 MG: 10 INJECTION, EMULSION INTRAVENOUS at 09:25

## 2018-02-15 RX ADMIN — PROPOFOL 20 MG: 10 INJECTION, EMULSION INTRAVENOUS at 09:30

## 2018-02-15 RX ADMIN — SODIUM CHLORIDE, SODIUM LACTATE, POTASSIUM CHLORIDE, AND CALCIUM CHLORIDE 50 ML/HR: 600; 310; 30; 20 INJECTION, SOLUTION INTRAVENOUS at 08:16

## 2018-02-15 RX ADMIN — FAMOTIDINE 20 MG: 10 INJECTION, SOLUTION INTRAVENOUS at 08:16

## 2018-02-15 RX ADMIN — PROPOFOL 100 MG: 10 INJECTION, EMULSION INTRAVENOUS at 09:15

## 2018-02-15 RX ADMIN — PROPOFOL 20 MG: 10 INJECTION, EMULSION INTRAVENOUS at 09:28

## 2018-02-15 RX ADMIN — PROPOFOL 20 MG: 10 INJECTION, EMULSION INTRAVENOUS at 09:34

## 2018-02-15 RX ADMIN — SODIUM CHLORIDE, SODIUM LACTATE, POTASSIUM CHLORIDE, CALCIUM CHLORIDE: 600; 310; 30; 20 INJECTION, SOLUTION INTRAVENOUS at 09:11

## 2018-02-15 RX ADMIN — LIDOCAINE HYDROCHLORIDE 50 MG: 20 INJECTION, SOLUTION EPIDURAL; INFILTRATION; INTRACAUDAL; PERINEURAL at 09:10

## 2018-02-15 NOTE — ANESTHESIA POSTPROCEDURE EVALUATION
Post-Anesthesia Evaluation and Assessment    Patient: Carmen Ambrose MRN: [id number]  SSN: [id number]    YOB: 1970  Age: [address] y.o. Sex: male       Cardiovascular Function/Vital Signs  Visit Vitals    /70    Pulse (!) 53    Temp 36.6 °C (97.9 °F)    Resp 18    Ht 5' 10\" (1.778 m)    Wt 82.1 kg (181 lb)    SpO2 99%    BMI 25.97 kg/m2       Patient is status post MAC anesthesia for Procedure(s):  LITHOTRIPSY EXTRACORPORAL SHOCKWAVE DORNIER, Right kidney. Nausea/Vomiting: None    Postoperative hydration reviewed and adequate. Pain:  Pain Scale 1: Numeric (0 - 10) (02/15/18 5593)  Pain Intensity 1: 1 (02/15/18 0868)   Managed    Neurological Status:   Neuro (WDL): Within Defined Limits (02/15/18 0817)   At baseline    Mental Status and Level of Consciousness: Arousable    Pulmonary Status:   O2 Device: Room air (02/15/18 0954)   Adequate oxygenation and airway patent    Complications related to anesthesia: None    Post-anesthesia assessment completed.  No concerns    Signed By: Jamir Dallas MD     February 15, 2018

## 2018-02-15 NOTE — PERIOP NOTES
Patient received Heparin SubQ during a recent hospitalization. Last dose was given on 2/11/2018. Okay to proceed with case per Dr Maris Medley.   Dayana Figueroa

## 2018-02-15 NOTE — ADDENDUM NOTE
Addendum  created 02/15/18 1017 by Evan Little MD    New alternative orders accepted, Order sets accessed

## 2018-02-15 NOTE — IP AVS SNAPSHOT
Elliot Vann 
 
 
 [address] [address] [address] [address] [phone] Patient: Elaine David MRN: [id number] :1970 About your hospitalization You were admitted on:  February 15, 2018 You last received care in the:  [facility] You were discharged on:  February 15, 2018 Why you were hospitalized Your primary diagnosis was:  Not on File Follow-up Information Follow up With Details Comments Contact Info None   None (395) Patient stated that they have no PCP Your Scheduled Appointments 2018 CYSTOSCOPY URETERAL STENT INSERTION OR REMOVAL with MD GHADA Tay [facility] [address]) [address] [facility] [address]  Discharge Orders None A check ced indicates which time of day the medication should be taken. My Medications CONTINUE taking these medications Instructions Each Dose to Equal  
 Morning Noon Evening Bedtime  
 cephALEXin 250 mg capsule Commonly known as:  Raghavendra Grove Your last dose was: Your next dose is: Take 1 Cap by mouth two (2) times a day for 10 doses. Indications: PREVENTION OF BACTERIAL URINARY TRACT INFECTION  
 250 mg HYDROcodone-acetaminophen 5-300 mg tablet Commonly known as:  Noble Jock Your last dose was: Your next dose is: Take 1 Tab by mouth every four (4) hours as needed for up to 10 doses. Max Daily Amount: 6 Tabs. 1 Tab  
    
   
   
   
  
 polyethylene glycol 17 gram packet Commonly known as:  Judye Gaster Your last dose was: Your next dose is: Take 1 Packet by mouth daily. 17 g  
    
   
   
   
  
 tamsulosin 0.4 mg capsule Commonly known as:  FLOMAX Your last dose was: Your next dose is: Take 1 Cap by mouth daily for 15 days.   
 0.4 mg  
    
   
   
 Discharge Instructions DISCHARGE SUMMARY from Nurse POST-PROCEDURE INSTRUCTIONS: 
 
Call your Physician if you: 
? Observe any excess bleeding. ? Develop a temperature over 100.5o F. 
? Experience abdominal, shoulder or chest pain. ? Notice any signs of decreased circulation or nerve impairment to an extremity such as a change in color, persistent numbness, tingling, coldness or increase in pain. ? Vomit blood or you have nausea and vomiting lasting longer than 4 hours. ? Are unable to take medications. ? Are unable to urinate within 8 hours after discharge following general anesthesia or intravenous sedation. For the next 24 hours after receiving general anesthesia or intravenous sedation, or while taking prescription Narcotics, limit your activities: 
? Do NOT drive a motor vehicle, operate hazard machinery or power tools, or perform tasks that require coordination. The medication you received during your procedure may have some effect on your mental awareness. ? Do NOT make important personal or business decisions. The medication you received during your procedure may have some effect on your mental awareness. ? Do NOT drink alcoholic beverages. These drinks do not mix well with the medications that have been given to you. ? Upon discharge from the hospital, you must be accompanied by a responsible adult. ? Resume your diet as directed by your physician. ? Resume medications as your physician has prescribed. ? Please give a list of your current medications to your Primary Care Provider. ? Please update this list whenever your medications are discontinued, doses are changed, or new medications (including over-the-counter products) are added. ? Please carry medication information at all times in case of emergency situations. These are general instructions for a healthy lifestyle: No smoking/ No tobacco products/ Avoid exposure to second hand smoke. ? Surgeon General's Warning:  Quitting smoking now greatly reduces serious risk to your health. Obesity, smoking, and a sedentary lifestyle greatly increase your risk for illness. ? A healthy diet, regular physical exercise & weight monitoring are important for maintaining a healthy lifestyle ? You may be retaining fluid if you have a history of heart failure or if you experience any of the following symptoms:  Weight gain of 3 pounds or more overnight or 5 pounds in a week, increased swelling in our hands or feet or shortness of breath while lying flat in bed. Please call your doctor as soon as you notice any of these symptoms; do not wait until your next office visit. Recognize signs and symptoms of STROKE: 
F  -  Face looks uneven A  -  Arms unable to move or move unevenly S  -  Speech slurred or non-existent T  -  Time to call 911 - as soon as signs and symptoms begin - DO NOT go back to bed or wait to see If you get better - TIME IS BRAIN. Colorectal Screening ? Colorectal cancer almost always develops from precancerous polyps (abnormal growths) in the colon or rectum. Screening tests can find precancerous polyps, so that they can be removed before they turn into cancer. Screening tests can also find colorectal cancer early, when treatment works best. 
? Speak with your physician about when you should begin screening and how often you should be tested. LoSo Activation Thank you for requesting access to LoSo. Please follow the instructions below to securely access and download your online medical record. LoSo allows you to send messages to your doctor, view your test results, renew your prescriptions, schedule appointments, and more. How Do I Sign Up? 1. In your internet browser, go to [email or web address]. 2. Click on the First Time User? Click Here link in the Sign In box. You will see the New Member Sign Up page. 3. Enter your [employer] Access Code exactly as it appears below. You will not need to use this code after youve completed the sign-up process. If you do not sign up before the expiration date, you must request a new code. [employer] Access Code: [email or web address] Expires: 2018 11:44 PM (This is the date your SK bi[employer]armaceuticalst access code will ) 4. Enter the last four digits of your Social Security Number (xxxx) and Date of Birth (mm/dd/yyyy) as indicated and click Submit. You will be taken to the next sign-up page. 5. Create a SK biopharm[employer]st ID. This will be your [employer] login ID and cannot be changed, so think of one that is secure and easy to remember. 6. Create a [employer] password. You can change your password at any time. 7. Enter your Password Reset Question and Answer. This can be used at a later time if you forget your password. 8. Enter your e-mail address. You will receive e-mail notification when new information is available in [address] 9. Click Sign Up. You can now view and download portions of your medical record. 10. Click the Download Summary menu link to download a portable copy of your medical information. Additional Information If you have questions, please call [phone]. Remember, [employer] is NOT to be used for urgent needs. For medical emergencies, dial 911. Educational references and/or instructions provided during this visit included: 
 
Lithotripsy Laser Lithotripsy: What to Expect at [facility] Your Recovery Laser lithotripsy is a way to treat kidney stones. This treatment uses a laser to break kidney stones into tiny pieces. For several hours after the procedure you may have a burning feeling when you urinate. You may feel the urge to go even if you don't need to. This feeling should go away within a day. Drinking a lot of water can help. Your doctor also may advise you to take medicine that numbs the burning. This medicine is called phenazopyridine. It is available by prescription and over the counter. Brand names include Pyridium and Uristat. Your doctor may prescribe an antibiotic. This will help prevent an infection. You may have some blood in your urine for 2 or 3 days. Your doctor may have placed a small tube inside one of your ureters. Ureters are the tubes that connect the kidneys to the bladder. The small tube the doctor may have placed is called a stent. It may help the stone fragments pass through your body. Your doctor may remove the stent in a few weeks. Most stone fragments that are not removed pass out of the body within 24 hours. But sometimes it can take many weeks. If you have a large stone, you may need to come back for more treatments. This care sheet gives you a general idea about how long it will take for you to recover. But each person recovers at a different pace. Follow the steps below to feel better as quickly as possible. How can you care for yourself at home? Activity ? · Rest as much as you need to after you go home. ? · You may do your regular activities. But avoid hard exercise or sports for about a week or until there is no blood in your urine. Diet ? · You can eat your normal diet after lithotripsy. ? · Continue to drink plenty of fluids, enough so that your urine is light yellow or clear like water. If you have kidney, heart, or liver disease and have to limit fluids, talk with your doctor before you increase the amount of fluids you drink. Medicines ? · Your doctor will tell you if and when you can restart your medicines. He or she will also give you instructions about taking any new medicines. ? · If you take blood thinners, such as warfarin (Coumadin), clopidogrel (Plavix), or aspirin, be sure to talk to your doctor.  He or she will tell you if and when to start taking those medicines again. Make sure that you understand exactly what your doctor wants you to do. ? · If you take medicine to stop the burning when you urinate, take it exactly as recommended. Call your doctor if you think you are having a problem with your medicine. This medicine may color your urine orange or red. This is normal. You will get more details on the specific medicine your doctor recommends. ? · If your doctor prescribed antibiotics, take them as directed. Do not stop taking them just because you feel better. You need to take the full course of antibiotics. ? · Be safe with medicines. Read and follow all instructions on the label. ¨ If the doctor gave you a prescription medicine for pain, take it as prescribed. ¨ If you are not taking a prescription pain medicine, ask your doctor if you can take acetaminophen (Tylenol). Do not take ibuprofen (Advil, Motrin) or naproxen (Aleve) or similar medicines unless your doctor tells you to. ¨ Do not take two or more pain medicines at the same time unless the doctor told you to. Many pain medicines have acetaminophen, which is Tylenol. Too much acetaminophen (Tylenol) can be harmful. ? Heat ? · Take a warm bath. This may soothe the burning. Other instructions ? · Urinate through the strainer the doctor gives you. Save any stone pieces, including those that look like sand or gravel. Take these to your doctor. This will help your doctor find the cause of your stones. Follow-up care is a key part of your treatment and safety. Be sure to make and go to all appointments, and call your doctor if you are having problems. It's also a good idea to know your test results and keep a list of the medicines you take. When should you call for help? Call 911 anytime you think you may need emergency care. For example, call if: 
? · You passed out (lost consciousness). ? · You have chest pain, are short of breath, or cough up blood. ?Call your doctor now or seek immediate medical care if: 
? · You have pain that does not get better after you take pain medicine. ? · You have new or more blood clots in your urine. (It is normal for the urine to be pink for a few days.) ? · You cannot urinate. ? · You have symptoms of a urinary tract infection. These may include: 
¨ Pain or burning when you urinate. ¨ A frequent need to urinate without being able to pass much urine. ¨ Pain in the flank, which is just below the rib cage and above the waist on either side of the back. ¨ Blood in the urine. ¨ A fever. ? · You are sick to your stomach or cannot drink fluids. ? · You have signs of a blood clot in your leg (called a deep vein thrombosis), such as: 
¨ Pain in the calf, back of the knee, thigh, or groin. ¨ Redness and swelling in your leg. ? Watch closely for any changes in your health, and be sure to contact your doctor if you have any problems. Where can you learn more? Go to http://marcellaTransition Therapeuticsjaqui.info/. Enter Q239 in the search box to learn more about \"Laser Lithotripsy: What to Expect at Home. \" Current as of: May 12, 2017 Content Version: 11.4 © 7897-7981 [employer]. Care instructions adapted under license by [employer] (which disclaims liability or warranty for this information). If you have questions about a medical condition or this instruction, always ask your healthcare professional. [place] [address] any warranty or liability for your use of this information. APPOINTMENTS: 
 
{[facility] GI APPTS:67367} Discharge information has been reviewed with the {PATIENT PARENT GUARDIAN:38996}. The {PATIENT PARENT GUARDIAN:81558} verbalized understanding. Introducing [facility]!    
 Arturo Serrano introduces [employer] patient portal. Now you can access parts of your medical record, email your doctor's office, and request medication refills online. 1. In your internet browser, go to [email or web address]://[email or web address]. 2. Click on the First Time User? Click Here link in the Sign In box. You will see the New Member Sign Up page. 3. Enter your C[employer] Access Code exactly as it appears below. You will not need to use this code after youve completed the sign-up process. If you do not sign up before the expiration date, you must request a new code. · [employer] Access Code: [email or web address] Expires: 5/7/2018 11:44 PM 
 
4. Enter the last four digits of your Social Security Number (xxxx) and Date of Birth (mm/dd/yyyy) as indicated and click Submit. You will be taken to the next sign-up page. 5. Create a C[employer] ID. This will be your [employer] login ID and cannot be changed, so think of one that is secure and easy to remember. 6. Create a [employer] password. You can change your password at any time. 7. Enter your Password Reset Question and Answer. This can be used at a later time if you forget your password. 8. Enter your e-mail address. You will receive e-mail notification when new information is available in [address] 9. Click Sign Up. You can now view and download portions of your medical record. 10. Click the Download Summary menu link to download a portable copy of your medical information. If you have questions, please visit the Frequently Asked Questions section of the C[employer] website. Remember, [employer] is NOT to be used for urgent needs. For medical emergencies, dial 911. Now available from your iPhone and Android! Providers Seen During Your Hospitalization Provider Specialty Primary office phone Kevin Menendez MD Urology [phone] Your Primary Care Physician (PCP) Primary Care Physician Office Phone Office Fax NONE ** None ** ** None ** You are allergic to the following Allergen Reactions Codeine Other (comments) Pt. States \"I throw up\". Recent Documentation Height Weight BMI Smoking Status 1.778 m 82.1 kg 25.97 kg/m2 Never Smoker Emergency Contacts Name Discharge Info Relation Home Work [facility] DISCHARGE CAREGIVER [3] Spouse [3] [phone] Patient Belongings The following personal items are in your possession at time of discharge: 
  Dental Appliances: None  Visual Aid: None   Hearing Aids/Status:  (None)  Home Medications: None   Jewelry: With patient  Clothing: With patient    Other Valuables: None Please provide this summary of care documentation to your next provider. Signatures-by signing, you are acknowledging that this After Visit Summary has been reviewed with you and you have received a copy. Patient Signature:  ____________________________________________________________ Date:  ____________________________________________________________  
  
[facility] Provider Signature:  ____________________________________________________________ Date:  ____________________________________________________________

## 2018-02-15 NOTE — ANESTHESIA PREPROCEDURE EVALUATION
Anesthetic History   No history of anesthetic complications            Review of Systems / Medical History  Patient summary reviewed, nursing notes reviewed and pertinent labs reviewed    Pulmonary  Within defined limits                 Neuro/Psych   Within defined limits           Cardiovascular  Within defined limits                     GI/Hepatic/Renal  Within defined limits              Endo/Other  Within defined limits           Other Findings   Comments: Documentation of current medication  Current medications obtained, documented and obtained? YES      Risk Factors for Postoperative nausea/vomiting:       History of postoperative nausea/vomiting? NO       Female? NO       Motion sickness? NO       Intended opioid administration for postoperative analgesia? NO      Smoking Abstinence:  Current Smoker? no  Elective Surgery? YES  Seen preoperatively by anesthesiologist or proxy prior to day of surgery? YES  Pt abstained from smoking 24 hours prior to anesthesia?  YES    Preventive care/screening for High Blood Pressure:  Aged 18 years and older: YES  Screened for high blood pressure: YES  Patients with high blood pressure referred to primary care provider   for BP management: YES                 Physical Exam    Airway  Mallampati: II  TM Distance: < 4 cm  Neck ROM: normal range of motion   Mouth opening: Normal     Cardiovascular    Rhythm: regular  Rate: normal         Dental  No notable dental hx       Pulmonary  Breath sounds clear to auscultation               Abdominal  GI exam deferred       Other Findings            Anesthetic Plan    ASA: 2  Anesthesia type: MAC          Induction: Intravenous  Anesthetic plan and risks discussed with: Patient

## 2018-02-15 NOTE — OP NOTES
[address] [place] [address] Dr  OPERATIVE REPORT    Oralia Figueroa  MR#: [id number]  : 1970  ACCOUNT #: [de-identified]   DATE OF SERVICE: 02/15/2018    PREOPERATIVE DIAGNOSIS:  Right renal pelvic stone. POSTOPERATIVE DIAGNOSIS:  Right renal pelvic stone. PROCEDURE PERFORMED:  ESWL     SURGEON:  Lucas Nunez MD     ASSISTANT:  None. ANESTHESIA: MAC    SPECIMENS REMOVED:  None. ESTIMATED BLOOD LOSS:  None. COMPLICATIONS:  None. IMPLANTS:  None. DESCRIPTION OF PROCEDURE:  Under satisfactory anesthesia, the patient was prepped and draped on the lithotripsy table. The stone was brought into focus and was easily identified adjacent to the previously placed double-J stent. A total of 2400 shocks were used to fragment the stone into very small particles. He tolerated this well and was taken to recovery room in stable condition. He has pain medication left over from his previous hospitalization for the same stone, so he will be discharged on no medication and he already has an appointment to be seen in our office for cystoscopy and stent removal in one week.       MD CHRIS Oseguera / [place]  D: 02/15/2018 09:49     T: 02/15/2018 11:38  JOB #: [id number]

## 2018-02-15 NOTE — IP AVS SNAPSHOT
Summary of Care Report The Summary of Care report has been created to help improve care coordination. Users with access to [email or web address] or [employer] (Web-based application) may access additional patient information including the Discharge Summary. If you are not currently a [address] user and need more information, please call the number listed below in the [place] [address] section and ask to be connected with Medical Records. Facility Information Name Address Phone [address] [facility] [address] [facility] [address] [phone] Patient Information Patient Name Sex SAIRA Michele ([id number]) Male 1970 Discharge Information Admitting Provider Service Area Unit Chula Worrell MD / [id number] [facility] / [phone] Discharge Provider Discharge Date/Time Discharge Disposition Destination (none) 2/15/2018 (Pending) AHR (none) Patient Language Language ENGLISH [13] Hospital Problems as of 2/15/2018  Reviewed: 2018  4:03 PM by Amelia Medina MD  
 None Non-Hospital Problems as of 2/15/2018  Reviewed: 2018  4:03 PM by Amelia Medina MD  
  
  
  
 Class Noted - Resolved Last Modified Active Problems Dehydration  2/10/2018 - Present 2/10/2018 by EDUIN Garcia Entered by EDUIN Garcia Acute kidney injury (nontraumatic) ([facility] [address]  2/10/2018 - Present 2/10/2018 by EDUIN Garcia Entered by EDUIN Garcia Right ureteral stone  2/10/2018 - Present 2/10/2018 by EDUIN Garcia Entered by EDUIN Garcia Right nephrolithiasis  2/10/2018 - Present 2/10/2018 by Farshad Young MD  
  Entered by Farshad Young MD  
  
You are allergic to the following Allergen Reactions Codeine Other (comments) Pt. States \"I throw up\".   
  
  
Current Discharge Medication List  
  
 CONTINUE these medications which have NOT CHANGED Dose & Instructions Dispensing Information Comments  
 cephALEXin 250 mg capsule Commonly known as:  Lauren Garter Dose:  250 mg Take 1 Cap by mouth two (2) times a day for 10 doses. Indications: PREVENTION OF BACTERIAL URINARY TRACT INFECTION Quantity:  10 Cap Refills:  0 HYDROcodone-acetaminophen 5-300 mg tablet Commonly known as:  Dorethia Jo Ann Dose:  1 Tab Take 1 Tab by mouth every four (4) hours as needed for up to 10 doses. Max Daily Amount: 6 Tabs. Quantity:  10 Tab Refills:  0  
   
 polyethylene glycol 17 gram packet Commonly known as:  Rosezena Peyton Dose:  17 g Take 1 Packet by mouth daily. Quantity:  10 Packet Refills:  0  
   
 tamsulosin 0.4 mg capsule Commonly known as:  FLOMAX Dose:  0.4 mg Take 1 Cap by mouth daily for 15 days. Quantity:  15 Cap Refills:  0 Surgery Information ID Date/Time Status Primary Surgeon All Procedures Location [id number] 2/15/2018 0830 Unposted Miroslava Tang MD LITHOTRIPSY EXTRACORPORAL SHOCKWAVE DORNIER, Right kidney [facility] Follow-up Information Follow up With Details Comments Contact Info None   None (395) Patient stated that they have no PCP Discharge Instructions DISCHARGE SUMMARY from Nurse POST-PROCEDURE INSTRUCTIONS: 
 
Call your Physician if you: 
? Observe any excess bleeding. ? Develop a temperature over 100.5o F. 
? Experience abdominal, shoulder or chest pain. ? Notice any signs of decreased circulation or nerve impairment to an extremity such as a change in color, persistent numbness, tingling, coldness or increase in pain. ? Vomit blood or you have nausea and vomiting lasting longer than 4 hours. ? Are unable to take medications. ? Are unable to urinate within 8 hours after discharge following general anesthesia or intravenous sedation. For the next 24 hours after receiving general anesthesia or intravenous sedation, or while taking prescription Narcotics, limit your activities: 
? Do NOT drive a motor vehicle, operate hazard machinery or power tools, or perform tasks that require coordination. The medication you received during your procedure may have some effect on your mental awareness. ? Do NOT make important personal or business decisions. The medication you received during your procedure may have some effect on your mental awareness. ? Do NOT drink alcoholic beverages. These drinks do not mix well with the medications that have been given to you. ? Upon discharge from the hospital, you must be accompanied by a responsible adult. ? Resume your diet as directed by your physician. ? Resume medications as your physician has prescribed. ? Please give a list of your current medications to your Primary Care Provider. ? Please update this list whenever your medications are discontinued, doses are changed, or new medications (including over-the-counter products) are added. ? Please carry medication information at all times in case of emergency situations. These are general instructions for a healthy lifestyle: No smoking/ No tobacco products/ Avoid exposure to second hand smoke. ? Surgeon General's Warning:  Quitting smoking now greatly reduces serious risk to your health. Obesity, smoking, and a sedentary lifestyle greatly increase your risk for illness. ? A healthy diet, regular physical exercise & weight monitoring are important for maintaining a healthy lifestyle ? You may be retaining fluid if you have a history of heart failure or if you experience any of the following symptoms:  Weight gain of 3 pounds or more overnight or 5 pounds in a week, increased swelling in our hands or feet or shortness of breath while lying flat in bed.   Please call your doctor as soon as you notice any of these symptoms; do not wait until your next office visit. Recognize signs and symptoms of STROKE: 
F  -  Face looks uneven A  -  Arms unable to move or move unevenly S  -  Speech slurred or non-existent T  -  Time to call 911 - as soon as signs and symptoms begin - DO NOT go back to bed or wait to see If you get better - TIME IS BRAIN. Colorectal Screening ? Colorectal cancer almost always develops from precancerous polyps (abnormal growths) in the colon or rectum. Screening tests can find precancerous polyps, so that they can be removed before they turn into cancer. Screening tests can also find colorectal cancer early, when treatment works best. 
? Speak with your physician about when you should begin screening and how often you should be tested. [facility] Activation Thank you for requesting access to [facility]. Please follow the instructions below to securely access and download your online medical record. [facility] allows you to send messages to your doctor, view your test results, renew your prescriptions, schedule appointments, and more. How Do I Sign Up? 1. In your internet browser, go to [email or web address]. 2. Click on the First Time User? Click Here link in the Sign In box. You will see the New Member Sign Up page. 3. Enter your [facility] Access Code exactly as it appears below. You will not need to use this code after youve completed the sign-up process. If you do not sign up before the expiration date, you must request a new code. [facility] Access Code: [email or web address] Expires: 2018 11:44 PM (This is the date your [facility] access code will ) 4. Enter the last four digits of your Social Security Number (xxxx) and Date of Birth (mm/dd/yyyy) as indicated and click Submit. You will be taken to the next sign-up page. 5. Create a ScheduleSoftt ID. This will be your [employer] login ID and cannot be changed, so think of one that is secure and easy to remember. 6. Create a [employer] password. You can change your password at any time. 7. Enter your Password Reset Question and Answer. This can be used at a later time if you forget your password. 8. Enter your e-mail address. You will receive e-mail notification when new information is available in [address] 9. Click Sign Up. You can now view and download portions of your medical record. 10. Click the Download Summary menu link to download a portable copy of your medical information. Additional Information If you have questions, please call [phone]. Remember, [employer] is NOT to be used for urgent needs. For medical emergencies, dial 911. Educational references and/or instructions provided during this visit included: 
 
Lithotripsy Laser Lithotripsy: What to Expect at [facility] Your Recovery Laser lithotripsy is a way to treat kidney stones. This treatment uses a laser to break kidney stones into tiny pieces. For several hours after the procedure you may have a burning feeling when you urinate. You may feel the urge to go even if you don't need to. This feeling should go away within a day. Drinking a lot of water can help. Your doctor also may advise you to take medicine that numbs the burning. This medicine is called phenazopyridine. It is available by prescription and over the counter. Brand names include Pyridium and Uristat. Your doctor may prescribe an antibiotic. This will help prevent an infection. You may have some blood in your urine for 2 or 3 days. Your doctor may have placed a small tube inside one of your ureters. Ureters are the tubes that connect the kidneys to the bladder. The small tube the doctor may have placed is called a stent. It may help the stone fragments pass through your body. Your doctor may remove the stent in a few weeks. Most stone fragments that are not removed pass out of the body within 24 hours. But sometimes it can take many weeks. If you have a large stone, you may need to come back for more treatments. This care sheet gives you a general idea about how long it will take for you to recover. But each person recovers at a different pace. Follow the steps below to feel better as quickly as possible. How can you care for yourself at home? Activity ? · Rest as much as you need to after you go home. ? · You may do your regular activities. But avoid hard exercise or sports for about a week or until there is no blood in your urine. Diet ? · You can eat your normal diet after lithotripsy. ? · Continue to drink plenty of fluids, enough so that your urine is light yellow or clear like water. If you have kidney, heart, or liver disease and have to limit fluids, talk with your doctor before you increase the amount of fluids you drink. Medicines ? · Your doctor will tell you if and when you can restart your medicines. He or she will also give you instructions about taking any new medicines. ? · If you take blood thinners, such as warfarin (Coumadin), clopidogrel (Plavix), or aspirin, be sure to talk to your doctor. He or she will tell you if and when to start taking those medicines again. Make sure that you understand exactly what your doctor wants you to do. ? · If you take medicine to stop the burning when you urinate, take it exactly as recommended. Call your doctor if you think you are having a problem with your medicine. This medicine may color your urine orange or red. This is normal. You will get more details on the specific medicine your doctor recommends. ? · If your doctor prescribed antibiotics, take them as directed. Do not stop taking them just because you feel better. You need to take the full course of antibiotics. ? · Be safe with medicines. Read and follow all instructions on the label. ¨ If the doctor gave you a prescription medicine for pain, take it as prescribed. ¨ If you are not taking a prescription pain medicine, ask your doctor if you can take acetaminophen (Tylenol). Do not take ibuprofen (Advil, Motrin) or naproxen (Aleve) or similar medicines unless your doctor tells you to. ¨ Do not take two or more pain medicines at the same time unless the doctor told you to. Many pain medicines have acetaminophen, which is Tylenol. Too much acetaminophen (Tylenol) can be harmful. ? Heat ? · Take a warm bath. This may soothe the burning. Other instructions ? · Urinate through the strainer the doctor gives you. Save any stone pieces, including those that look like sand or gravel. Take these to your doctor. This will help your doctor find the cause of your stones. Follow-up care is a key part of your treatment and safety. Be sure to make and go to all appointments, and call your doctor if you are having problems. It's also a good idea to know your test results and keep a list of the medicines you take. When should you call for help? Call 911 anytime you think you may need emergency care. For example, call if: 
? · You passed out (lost consciousness). ? · You have chest pain, are short of breath, or cough up blood. ?Call your doctor now or seek immediate medical care if: 
? · You have pain that does not get better after you take pain medicine. ? · You have new or more blood clots in your urine. (It is normal for the urine to be pink for a few days.) ? · You cannot urinate. ? · You have symptoms of a urinary tract infection. These may include: 
¨ Pain or burning when you urinate. ¨ A frequent need to urinate without being able to pass much urine. ¨ Pain in the flank, which is just below the rib cage and above the waist on either side of the back. ¨ Blood in the urine. ¨ A fever. ? · You are sick to your stomach or cannot drink fluids. ? · You have signs of a blood clot in your leg (called a deep vein thrombosis), such as: 
¨ Pain in the calf, back of the knee, thigh, or groin. ¨ Redness and swelling in your leg. ? Watch closely for any changes in your health, and be sure to contact your doctor if you have any problems. Where can you learn more? Go to http://marcella-jaqui.info/. Enter Q239 in the search box to learn more about \"Laser Lithotripsy: What to Expect at Home. \" Current as of: May 12, 2017 Content Version: 11.4 © 5366-7441 [employer]. Care instructions adapted under license by Number 100 (which disclaims liability or warranty for this information). If you have questions about a medical condition or this instruction, always ask your healthcare professional. [address] any warranty or liability for your use of this information. APPOINTMENTS: 
 
{HBV GI APPTS:49446} Discharge information has been reviewed with the {PATIENT PARENT GUARDIAN:85486}. The {PATIENT PARENT GUARDIAN:28833} verbalized understanding. Chart Review Routing History Recipient Method Report Sent By Brock Adams MD  
Phone: [phone] [employer], [place] [[id number]] 2/14/2018 10:02 AM 02/14/2018

## 2018-02-15 NOTE — DISCHARGE INSTRUCTIONS
DISCHARGE SUMMARY from Nurse     POST-PROCEDURE INSTRUCTIONS:    Call your Physician if you:  ? Observe any excess bleeding. ? Develop a temperature over 100.5o F.  ? Experience abdominal, shoulder or chest pain. ? Notice any signs of decreased circulation or nerve impairment to an extremity such as a change in color, persistent numbness, tingling, coldness or increase in pain. ? Vomit blood or you have nausea and vomiting lasting longer than 4 hours. ? Are unable to take medications. ? Are unable to urinate within 8 hours after discharge following general anesthesia or intravenous sedation. For the next 24 hours after receiving general anesthesia or intravenous sedation, or while taking prescription Narcotics, limit your activities:  ? Do NOT drive a motor vehicle, operate hazard machinery or power tools, or perform tasks that require coordination. The medication you received during your procedure may have some effect on your mental awareness. ? Do NOT make important personal or business decisions. The medication you received during your procedure may have some effect on your mental awareness. ? Do NOT drink alcoholic beverages. These drinks do not mix well with the medications that have been given to you. ? Upon discharge from the hospital, you must be accompanied by a responsible adult. ? Resume your diet as directed by your physician. ? Resume medications as your physician has prescribed. ? Please give a list of your current medications to your Primary Care Provider. ? Please update this list whenever your medications are discontinued, doses are changed, or new medications (including over-the-counter products) are added. ? Please carry medication information at all times in case of emergency situations. These are general instructions for a healthy lifestyle:    No smoking/ No tobacco products/ Avoid exposure to second hand smoke.    Surgeon General's Warning: Quitting smoking now greatly reduces serious risk to your health. Obesity, smoking, and a sedentary lifestyle greatly increase your risk for illness.  A healthy diet, regular physical exercise & weight monitoring are important for maintaining a healthy lifestyle   You may be retaining fluid if you have a history of heart failure or if you experience any of the following symptoms:  Weight gain of 3 pounds or more overnight or 5 pounds in a week, increased swelling in our hands or feet or shortness of breath while lying flat in bed. Please call your doctor as soon as you notice any of these symptoms; do not wait until your next office visit. Recognize signs and symptoms of STROKE:  F  -  Face looks uneven  A  -  Arms unable to move or move unevenly  S  -  Speech slurred or non-existent  T  -  Time to call 911 - as soon as signs and symptoms begin - DO NOT go back to bed or wait to see If you get better - TIME IS BRAIN. Colorectal Screening   Colorectal cancer almost always develops from precancerous polyps (abnormal growths) in the colon or rectum. Screening tests can find precancerous polyps, so that they can be removed before they turn into cancer. Screening tests can also find colorectal cancer early, when treatment works best.  [facility] Speak with your physician about when you should begin screening and how often you should be tested. [employer] Activation    Thank you for requesting access to [employer]. Please follow the instructions below to securely access and download your online medical record. [employer] allows you to send messages to your doctor, view your test results, renew your prescriptions, schedule appointments, and more. How Do I Sign Up? 1. In your internet browser, go to [email or web address]. 2. Click on the First Time User? Click Here link in the Sign In box. You will see the New Member Sign Up page.   3. Enter your [employer] Access Code exactly as it appears below. You will not need to use this code after youve completed the sign-up process. If you do not sign up before the expiration date, you must request a new code. [employer] Access Code: [email or web address]  Expires: 2018 11:44 PM (This is the date your [employer] access code will )    4. Enter the last four digits of your Social Security Number (xxxx) and Date of Birth (mm/dd/yyyy) as indicated and click Submit. You will be taken to the next sign-up page. 5. Create a [employer] ID. This will be your [employer] login ID and cannot be changed, so think of one that is secure and easy to remember. 6. Create a [employer] password. You can change your password at any time. 7. Enter your Password Reset Question and Answer. This can be used at a later time if you forget your password. 8. Enter your e-mail address. You will receive e-mail notification when new information is available in [address]. 9. Click Sign Up. You can now view and download portions of your medical record. 10. Click the Download Summary menu link to download a portable copy of your medical information. Additional Information    If you have questions, please call [phone]. Remember, [employer] is NOT to be used for urgent needs. For medical emergencies, dial 911. Educational references and/or instructions provided during this visit included:    Lithotripsy     Laser Lithotripsy: What to Expect at [address] lithotripsy is a way to treat kidney stones. This treatment uses a laser to break kidney stones into tiny pieces. For several hours after the procedure you may have a burning feeling when you urinate. You may feel the urge to go even if you don't need to. This feeling should go away within a day. Drinking a lot of water can help. Your doctor also may advise you to take medicine that numbs the burning. This medicine is called phenazopyridine.  It is available by prescription and over the counter. Brand names include Pyridium and Uristat. Your doctor may prescribe an antibiotic. This will help prevent an infection. You may have some blood in your urine for 2 or 3 days. Your doctor may have placed a small tube inside one of your ureters. Ureters are the tubes that connect the kidneys to the bladder. The small tube the doctor may have placed is called a stent. It may help the stone fragments pass through your body. Your doctor may remove the stent in a few weeks. Most stone fragments that are not removed pass out of the body within 24 hours. But sometimes it can take many weeks. If you have a large stone, you may need to come back for more treatments. This care sheet gives you a general idea about how long it will take for you to recover. But each person recovers at a different pace. Follow the steps below to feel better as quickly as possible. How can you care for yourself at home? Activity  ? · Rest as much as you need to after you go home. ? · You may do your regular activities. But avoid hard exercise or sports for about a week or until there is no blood in your urine. Diet  ? · You can eat your normal diet after lithotripsy. ? · Continue to drink plenty of fluids, enough so that your urine is light yellow or clear like water. If you have kidney, heart, or liver disease and have to limit fluids, talk with your doctor before you increase the amount of fluids you drink. Medicines  ? · Your doctor will tell you if and when you can restart your medicines. He or she will also give you instructions about taking any new medicines. ? · If you take blood thinners, such as warfarin (Coumadin), clopidogrel (Plavix), or aspirin, be sure to talk to your doctor. He or she will tell you if and when to start taking those medicines again. Make sure that you understand exactly what your doctor wants you to do.    ? · If you take medicine to stop the burning when you urinate, take it exactly as recommended. Call your doctor if you think you are having a problem with your medicine. This medicine may color your urine orange or red. This is normal. You will get more details on the specific medicine your doctor recommends. ? · If your doctor prescribed antibiotics, take them as directed. Do not stop taking them just because you feel better. You need to take the full course of antibiotics. ? · Be safe with medicines. Read and follow all instructions on the label. ¨ If the doctor gave you a prescription medicine for pain, take it as prescribed. ¨ If you are not taking a prescription pain medicine, ask your doctor if you can take acetaminophen (Tylenol). Do not take ibuprofen (Advil, Motrin) or naproxen (Aleve) or similar medicines unless your doctor tells you to. ¨ Do not take two or more pain medicines at the same time unless the doctor told you to. Many pain medicines have acetaminophen, which is Tylenol. Too much acetaminophen (Tylenol) can be harmful. ? Heat  ? · Take a warm bath. This may soothe the burning. Other instructions  ? · Urinate through the strainer the doctor gives you. Save any stone pieces, including those that look like sand or gravel. Take these to your doctor. This will help your doctor find the cause of your stones. Follow-up care is a key part of your treatment and safety. Be sure to make and go to all appointments, and call your doctor if you are having problems. It's also a good idea to know your test results and keep a list of the medicines you take. When should you call for help? Call 911 anytime you think you may need emergency care. For example, call if:  ? · You passed out (lost consciousness). ? · You have chest pain, are short of breath, or cough up blood. ?Call your doctor now or seek immediate medical care if:  ? · You have pain that does not get better after you take pain medicine. ? · You have new or more blood clots in your urine.  (It is normal for the urine to be pink for a few days.)   ? · You cannot urinate. ? · You have symptoms of a urinary tract infection. These may include:  ¨ Pain or burning when you urinate. ¨ A frequent need to urinate without being able to pass much urine. ¨ Pain in the flank, which is just below the rib cage and above the waist on either side of the back. ¨ Blood in the urine. ¨ A fever. ? · You are sick to your stomach or cannot drink fluids. ? · You have signs of a blood clot in your leg (called a deep vein thrombosis), such as:  ¨ Pain in the calf, back of the knee, thigh, or groin. ¨ Redness and swelling in your leg. ? Watch closely for any changes in your health, and be sure to contact your doctor if you have any problems. Where can you learn more? Go to http://marcella-jaqui.info/. Enter Q239 in the search box to learn more about \"Laser Lithotripsy: What to Expect at Home. \"  Current as of: May 12, 2017  Content Version: 11.4  © 2094-1991 [employer]. Care instructions adapted under license by [employer] (which disclaims liability or warranty for this information). If you have questions about a medical condition or this instruction, always ask your healthcare professional. [address] any warranty or liability for your use of this information. Discharge information has been reviewed with the patient. The patient verbalized understanding.

## 2018-02-19 ENCOUNTER — OFFICE VISIT (OUTPATIENT)
Dept: UROLOGY | Age: 48
End: 2018-02-19

## 2018-02-19 DIAGNOSIS — N20.0 KIDNEY STONE: Primary | ICD-10-CM

## 2018-02-19 RX ORDER — SODIUM CHLORIDE 9 MG/ML
100 INJECTION, SOLUTION INTRAVENOUS CONTINUOUS
Status: CANCELLED | OUTPATIENT
Start: 2018-02-19

## 2018-02-20 ENCOUNTER — ANESTHESIA EVENT (OUTPATIENT)
Dept: SURGERY | Age: 48
End: 2018-02-20
Payer: COMMERCIAL

## 2018-02-20 NOTE — H&P
HISTORY OF PRESENT ILLNESS:     Edyta Magana is a 49 y.o.  male who presents with continued flank pain after noted nephrolithiasis . The patient underwent cystoscopy with placement of a stent and on the 15th of this month underwent right shock wave lithotripsy. He comes now for removal of his stent under anesthesia               Social History   Substance Use Topics    Smoking status: Not on file    Smokeless tobacco: Not on file    Alcohol use Not on file         No family history on file. Allergies   Allergen Reactions    Codeine Other (comments)       Pt. States \"I throw up\".            Prior to Admission medications    Medication Sig Start Date End Date Taking? Authorizing Provider   tamsulosin (FLOMAX) 0.4 mg capsule Take 1 Cap by mouth daily for 15 days. 2/10/18 2/25/18 Yes EDUIN Angeles   oxyCODONE-acetaminophen (PERCOCET) 5-325 mg per tablet Take 1 Tab by mouth every four (4) hours as needed for Pain. Max Daily Amount: 6 Tabs. 2/10/18   Yes EDUIN Angeles   ibuprofen (MOTRIN) 800 mg tablet Take 1 Tab by mouth every eight (8) hours for 5 days. 2/10/18 2/15/18 Yes EDUIN Angeles   ciprofloxacin HCl (CIPRO) 500 mg tablet Take 1 Tab by mouth two (2) times a day for 3 days.  2/10/18 2/13/18 Yes EDUIN Angeles         REVIEW OF SYSTEMS:     I am not able to complete the review of systems because:    The patient is intubated and sedated     The patient has altered mental status due to his acute medical problems     The patient has baseline aphasia from prior stroke(s)     The patient has baseline dementia and is not reliable historian     The patient is in acute medical distress and unable to provide information                 Objective:   VITALS:         Visit Vitals    /83 (BP 1 Location: Left arm, BP Patient Position: Sitting)    Pulse 82    Temp 98.2 °F (36.8 °C)    Resp 18    SpO2 100%         PHYSICAL EXAM:     General:                    Alert, cooperative, moderate distress, appears stated age. HEENT:                     Atraumatic, anicteric sclerae, pink conjunctivae                                              No oral ulcers, mucosa moist, throat clear, dentition fair  Neck:                                   Supple, symmetrical,  thyroid: non tender  Lungs:                       Clear to auscultation bilaterally. No Wheezing or Rhonchi. No rales. Chest wall:                No tenderness  No Accessory muscle use. Heart:                                  Regular  rhythm,  No  murmur   No edema  Abdomen:                  Soft, non-tender. Not distended. Bowel sounds normal; flank tenderness on right. Extremities:               No cyanosis. No clubbing,                                                Skin turgor normal, Capillary refill normal, Radial dial pulse 2+  Skin:                                    Not pale. Not Jaundiced  No rashes   Psych:                                 Good insight. Not depressed. Not anxious or agitated. Neurologic:                EOMs intact. No facial asymmetry. No aphasia or slurred speech. Symmetrical strength, Sensation grossly intact. Alert and oriented X 4.      _______________________________________________________________________  Care Plan discussed with:      Comments   Patient x     Family        RN       Care Manager                      Consultant:        _______________________________________________________________________  Expected  Disposition:   Home with Family x   HH/PT/OT/RN     SNF/LTC     [facility]     ________________________________________________________________________           Comments                ________________________________________________________________________    Assessment retained right ureteral stent following shockwave lithotripsy    Plan:  Anesthesia cystoscopy with removal of stent.   The patient is aware of the risks of postprocedural discomfort

## 2018-02-21 ENCOUNTER — APPOINTMENT (OUTPATIENT)
Dept: GENERAL RADIOLOGY | Age: 48
End: 2018-02-21
Attending: UROLOGY
Payer: COMMERCIAL

## 2018-02-21 ENCOUNTER — HOSPITAL ENCOUNTER (OUTPATIENT)
Age: 48
Setting detail: OUTPATIENT SURGERY
Discharge: HOME | End: 2018-02-21
Attending: UROLOGY | Admitting: UROLOGY
Payer: COMMERCIAL

## 2018-02-21 ENCOUNTER — ANESTHESIA (OUTPATIENT)
Dept: SURGERY | Age: 48
End: 2018-02-21
Payer: COMMERCIAL

## 2018-02-21 VITALS
WEIGHT: 171.13 LBS | OXYGEN SATURATION: 98 % | DIASTOLIC BLOOD PRESSURE: 73 MMHG | HEIGHT: 70 IN | SYSTOLIC BLOOD PRESSURE: 104 MMHG | RESPIRATION RATE: 15 BRPM | TEMPERATURE: 96.1 F | BODY MASS INDEX: 24.5 KG/M2 | HEART RATE: 66 BPM

## 2018-02-21 DIAGNOSIS — N20.0 RIGHT NEPHROLITHIASIS: Primary | ICD-10-CM

## 2018-02-21 LAB
AMPHET UR QL SCN: NEGATIVE
BARBITURATES UR QL SCN: NEGATIVE
BENZODIAZ UR QL: NEGATIVE
CANNABINOIDS UR QL SCN: NEGATIVE
COCAINE UR QL SCN: NEGATIVE
HDSCOM,HDSCOM: NORMAL
METHADONE UR QL: NEGATIVE
OPIATES UR QL: NEGATIVE
PCP UR QL: NEGATIVE

## 2018-02-21 PROCEDURE — 76210000020 HC REC RM PH II FIRST 0.5 HR: Performed by: UROLOGY

## 2018-02-21 PROCEDURE — 77030020782 HC GWN BAIR PAWS FLX 3M -B: Performed by: UROLOGY

## 2018-02-21 PROCEDURE — 74018 RADEX ABDOMEN 1 VIEW: CPT

## 2018-02-21 PROCEDURE — 76010000138 HC OR TIME 0.5 TO 1 HR: Performed by: UROLOGY

## 2018-02-21 PROCEDURE — 74011000250 HC RX REV CODE- 250: Performed by: NURSE ANESTHETIST, CERTIFIED REGISTERED

## 2018-02-21 PROCEDURE — 77030018836 HC SOL IRR NACL ICUM -A: Performed by: UROLOGY

## 2018-02-21 PROCEDURE — 74011250636 HC RX REV CODE- 250/636

## 2018-02-21 PROCEDURE — 80307 DRUG TEST PRSMV CHEM ANLYZR: CPT | Performed by: NURSE ANESTHETIST, CERTIFIED REGISTERED

## 2018-02-21 PROCEDURE — 74011250636 HC RX REV CODE- 250/636: Performed by: UROLOGY

## 2018-02-21 PROCEDURE — 77030032490 HC SLV COMPR SCD KNE COVD -B: Performed by: UROLOGY

## 2018-02-21 PROCEDURE — 76210000063 HC OR PH I REC FIRST 0.5 HR: Performed by: UROLOGY

## 2018-02-21 PROCEDURE — 77030010509 HC AIRWY LMA MSK TELE -A: Performed by: ANESTHESIOLOGY

## 2018-02-21 PROCEDURE — 74011000250 HC RX REV CODE- 250

## 2018-02-21 PROCEDURE — 76060000032 HC ANESTHESIA 0.5 TO 1 HR: Performed by: UROLOGY

## 2018-02-21 PROCEDURE — 77030019927 HC TBNG IRR CYSTO BAXT -A: Performed by: UROLOGY

## 2018-02-21 PROCEDURE — 74011250636 HC RX REV CODE- 250/636: Performed by: NURSE ANESTHETIST, CERTIFIED REGISTERED

## 2018-02-21 RX ORDER — SODIUM CHLORIDE, SODIUM LACTATE, POTASSIUM CHLORIDE, CALCIUM CHLORIDE 600; 310; 30; 20 MG/100ML; MG/100ML; MG/100ML; MG/100ML
75 INJECTION, SOLUTION INTRAVENOUS CONTINUOUS
Status: DISCONTINUED | OUTPATIENT
Start: 2018-02-21 | End: 2018-02-21 | Stop reason: HOSPADM

## 2018-02-21 RX ORDER — LIDOCAINE HYDROCHLORIDE 20 MG/ML
INJECTION, SOLUTION EPIDURAL; INFILTRATION; INTRACAUDAL; PERINEURAL AS NEEDED
Status: DISCONTINUED | OUTPATIENT
Start: 2018-02-21 | End: 2018-02-21 | Stop reason: HOSPADM

## 2018-02-21 RX ORDER — SODIUM CHLORIDE 9 MG/ML
100 INJECTION, SOLUTION INTRAVENOUS CONTINUOUS
Status: DISCONTINUED | OUTPATIENT
Start: 2018-02-21 | End: 2018-02-21 | Stop reason: HOSPADM

## 2018-02-21 RX ORDER — HYDROMORPHONE HYDROCHLORIDE 1 MG/ML
0.5 INJECTION, SOLUTION INTRAMUSCULAR; INTRAVENOUS; SUBCUTANEOUS AS NEEDED
Status: DISCONTINUED | OUTPATIENT
Start: 2018-02-21 | End: 2018-02-21 | Stop reason: HOSPADM

## 2018-02-21 RX ORDER — DEXAMETHASONE SODIUM PHOSPHATE 4 MG/ML
INJECTION, SOLUTION INTRA-ARTICULAR; INTRALESIONAL; INTRAMUSCULAR; INTRAVENOUS; SOFT TISSUE AS NEEDED
Status: DISCONTINUED | OUTPATIENT
Start: 2018-02-21 | End: 2018-02-21 | Stop reason: HOSPADM

## 2018-02-21 RX ORDER — PROPOFOL 10 MG/ML
INJECTION, EMULSION INTRAVENOUS AS NEEDED
Status: DISCONTINUED | OUTPATIENT
Start: 2018-02-21 | End: 2018-02-21 | Stop reason: HOSPADM

## 2018-02-21 RX ORDER — FENTANYL CITRATE 50 UG/ML
INJECTION, SOLUTION INTRAMUSCULAR; INTRAVENOUS AS NEEDED
Status: DISCONTINUED | OUTPATIENT
Start: 2018-02-21 | End: 2018-02-21 | Stop reason: HOSPADM

## 2018-02-21 RX ORDER — SODIUM CHLORIDE 0.9 % (FLUSH) 0.9 %
5-10 SYRINGE (ML) INJECTION AS NEEDED
Status: DISCONTINUED | OUTPATIENT
Start: 2018-02-21 | End: 2018-02-21 | Stop reason: HOSPADM

## 2018-02-21 RX ORDER — SODIUM CHLORIDE 0.9 % (FLUSH) 0.9 %
5-10 SYRINGE (ML) INJECTION EVERY 8 HOURS
Status: DISCONTINUED | OUTPATIENT
Start: 2018-02-21 | End: 2018-02-21 | Stop reason: HOSPADM

## 2018-02-21 RX ORDER — CEFAZOLIN SODIUM 2 G/50ML
2 SOLUTION INTRAVENOUS ONCE
Status: COMPLETED | OUTPATIENT
Start: 2018-02-21 | End: 2018-02-21

## 2018-02-21 RX ORDER — ONDANSETRON 2 MG/ML
INJECTION INTRAMUSCULAR; INTRAVENOUS AS NEEDED
Status: DISCONTINUED | OUTPATIENT
Start: 2018-02-21 | End: 2018-02-21 | Stop reason: HOSPADM

## 2018-02-21 RX ORDER — HYDROMORPHONE HYDROCHLORIDE 2 MG/ML
0.5 INJECTION, SOLUTION INTRAMUSCULAR; INTRAVENOUS; SUBCUTANEOUS AS NEEDED
Status: DISCONTINUED | OUTPATIENT
Start: 2018-02-21 | End: 2018-02-21

## 2018-02-21 RX ORDER — NALOXONE HYDROCHLORIDE 0.4 MG/ML
0.1 INJECTION, SOLUTION INTRAMUSCULAR; INTRAVENOUS; SUBCUTANEOUS ONCE
Status: DISCONTINUED | OUTPATIENT
Start: 2018-02-21 | End: 2018-02-21 | Stop reason: HOSPADM

## 2018-02-21 RX ADMIN — Medication 10 ML: at 08:28

## 2018-02-21 RX ADMIN — PROPOFOL 50 MG: 10 INJECTION, EMULSION INTRAVENOUS at 08:45

## 2018-02-21 RX ADMIN — SODIUM CHLORIDE, SODIUM LACTATE, POTASSIUM CHLORIDE, AND CALCIUM CHLORIDE 75 ML/HR: 600; 310; 30; 20 INJECTION, SOLUTION INTRAVENOUS at 08:27

## 2018-02-21 RX ADMIN — FAMOTIDINE 20 MG: 10 INJECTION INTRAVENOUS at 08:27

## 2018-02-21 RX ADMIN — PROPOFOL 50 MG: 10 INJECTION, EMULSION INTRAVENOUS at 08:52

## 2018-02-21 RX ADMIN — LIDOCAINE HYDROCHLORIDE 40 MG: 20 INJECTION, SOLUTION EPIDURAL; INFILTRATION; INTRACAUDAL; PERINEURAL at 08:41

## 2018-02-21 RX ADMIN — DEXAMETHASONE SODIUM PHOSPHATE 4 MG: 4 INJECTION, SOLUTION INTRA-ARTICULAR; INTRALESIONAL; INTRAMUSCULAR; INTRAVENOUS; SOFT TISSUE at 08:50

## 2018-02-21 RX ADMIN — FENTANYL CITRATE 100 MCG: 50 INJECTION, SOLUTION INTRAMUSCULAR; INTRAVENOUS at 09:00

## 2018-02-21 RX ADMIN — PROPOFOL 200 MG: 10 INJECTION, EMULSION INTRAVENOUS at 08:41

## 2018-02-21 RX ADMIN — PROPOFOL 100 MG: 10 INJECTION, EMULSION INTRAVENOUS at 09:00

## 2018-02-21 RX ADMIN — ONDANSETRON 4 MG: 2 INJECTION INTRAMUSCULAR; INTRAVENOUS at 09:00

## 2018-02-21 RX ADMIN — CEFAZOLIN SODIUM 2 G: 2 SOLUTION INTRAVENOUS at 08:40

## 2018-02-21 NOTE — ANESTHESIA PREPROCEDURE EVALUATION
Anesthetic History   No history of anesthetic complications            Review of Systems / Medical History  Patient summary reviewed and pertinent labs reviewed    Pulmonary  Within defined limits                 Neuro/Psych   Within defined limits           Cardiovascular                  Exercise tolerance: >4 METS     GI/Hepatic/Renal         Renal disease: stones       Endo/Other  Within defined limits          Comments: Used Marihuana and Cocaine ~ 26 years ago Other Findings   Comments: Documentation of current medication  Current medications obtained, documented and obtained? YES      Risk Factors for Postoperative nausea/vomiting:       History of postoperative nausea/vomiting? NO       Female? NO       Motion sickness? NO       Intended opioid administration for postoperative analgesia? NO      Smoking Abstinence:  Current Smoker? NO  Elective Surgery? YES  Seen preoperatively by anesthesiologist or proxy prior to day of surgery? YES  Pt abstained from smoking 24 hours prior to anesthesia?  N/A    Preventive care/screening for High Blood Pressure:  Aged 18 years and older: YES  Screened for high blood pressure: YES  Patients with high blood pressure referred to primary care provider   for BP management: YES                 Physical Exam    Airway  Mallampati: II  TM Distance: 4 - 6 cm  Neck ROM: normal range of motion   Mouth opening: Normal     Cardiovascular  Regular rate and rhythm,  S1 and S2 normal,  no murmur, click, rub, or gallop             Dental  No notable dental hx       Pulmonary  Breath sounds clear to auscultation               Abdominal  GI exam deferred       Other Findings            Anesthetic Plan    ASA: 2  Anesthesia type: general          Induction: Intravenous  Anesthetic plan and risks discussed with: Patient

## 2018-02-21 NOTE — ANESTHESIA POSTPROCEDURE EVALUATION
Post-Anesthesia Evaluation and Assessment    Patient: Tahir Garcia MRN: [id number]  SSN: [id number]    YOB: 1970  Age: 49 y.o. Sex: male       Cardiovascular Function/Vital Signs  Visit Vitals    /73 (BP 1 Location: Right arm, BP Patient Position: At rest)    Pulse 66    Temp 35.6 °C (96.1 °F)    Resp 15    Ht 5' 10\" (1.778 m)    Wt 77.6 kg (171 lb 2 oz)    SpO2 98%    BMI 24.55 kg/m2       Patient is status post general anesthesia for Procedure(s):  CYSTOSCOPY URETERAL STENT REMOVAL. Nausea/Vomiting: None    Postoperative hydration reviewed and adequate. Pain:  Pain Scale 1: Numeric (0 - 10) (02/21/18 1002)  Pain Intensity 1: 0 (02/21/18 1002)   Managed    Neurological Status:   Neuro (WDL): Within Defined Limits (02/21/18 0811)   At baseline    Mental Status and Level of Consciousness: Arousable    Pulmonary Status:   O2 Device: Room air (02/21/18 1002)   Adequate oxygenation and airway patent    Complications related to anesthesia: None    Post-anesthesia assessment completed.  No concerns    Signed By: Haylee Mckeon MD     February 21, 2018

## 2018-02-21 NOTE — OP NOTES
[address] [place] [address]   OPERATIVE REPORT    Teo Su  MR#: [id number]  : 1970  ACCOUNT #: [de-identified]   DATE OF SERVICE: 2018    PREOPERATIVE DIAGNOSIS:  Retained right ureteral stent. POSTOPERATIVE DIAGNOSIS:  Retained right ureteral stent. PROCEDURE:  Cystoscopy and stent extraction. SURGEON: Osorio Cuba MD    ASSISTANT:  none    INDICATIONS: A 41-year-old male  who underwent stent placement followed by a lithotripsy and comes now for stent removal.  The patient is aware of the risks that include but are not limited to infection, blood loss, pain, possible need for reinsertion of the stent or other procedures. He wishes to proceed giving his informed consent. ANESTHESIA:  General.    BLOOD LOSS:  None. FINDINGS: Stent. COMPLICATIONS:  None. SPECIMENS:  Stent. IMPLANTS:  none    DESCRIPTION OF PROCEDURE: The patient is anesthetized via the general route. Timeout was accomplished and verified. The patient was placed in the dorsal lithotomy position where he was prepped and draped in a sterile fashion. Fluoroscopy shows the stent is in good location and there is no evidence of residual calculus. A rigid cystoscope is inserted and retrograde evaluation carried out disclosing normal male anatomy. The bladder is entered. The bladder is flushed a couple of times. The stent is identified and grasped with a flexible grasping forceps and removed intact. The procedure was terminated. The patient tolerated the procedure well.       MD ISAC Hale / FELICIA  D: 2018 09:20     T: 2018 10:26  JOB #: [id number]

## 2018-02-21 NOTE — IP AVS SNAPSHOT
Melva Orlando 
 
 
 [address] [facility] [address] [facility] Patient: Aleah Hester MRN: [id number] :1970 About your hospitalization You were admitted on:  2018 You last received care in the:  [facility] PHASE 2 RECOVERY You were discharged on:  2018 Why you were hospitalized Your primary diagnosis was:  Not on File Follow-up Information Follow up With Details Comments Contact Info None   None ([phone]) Patient stated that they have no PCP Terryann Dakin, MD Follow up in 1 month(s) call for follow up in one month. [address] [facility] [address] [address] 
[phone] Discharge Orders Procedure Order Date Status Priority Quantity Spec Type Associated Dx XR ABD (KUB) 18 4383 Future Routine 1  Right nephrolithiasis [[id number]] Questions: Reason for Exam:  stone A check ced indicates which time of day the medication should be taken. My Medications CONTINUE taking these medications Instructions Each Dose to Equal  
 Morning Noon Evening Bedtime HYDROcodone-acetaminophen 5-300 mg tablet Commonly known as:  Stuart Britton Your last dose was: Your next dose is: Take 1 Tab by mouth every four (4) hours as needed for up to 10 doses. Max Daily Amount: 6 Tabs. 1 Tab Discharge Instructions DISCHARGE SUMMARY from Nurse PATIENT INSTRUCTIONS: 
 
 
F-face looks uneven A-arms unable to move or move unevenly S-speech slurred or non-existent T-time-call 911 as soon as signs and symptoms begin-DO NOT go Back to bed or wait to see if you get better-TIME IS BRAIN. Warning Signs of HEART ATTACK Call 911 if you have these symptoms: 
? Chest discomfort.  Most heart attacks involve discomfort in the center of the chest that lasts more than a few minutes, or that goes away and comes back. It can feel like uncomfortable pressure, squeezing, fullness, or pain. ? Discomfort in other areas of the upper body. Symptoms can include pain or discomfort in one or both arms, the back, neck, jaw, or stomach. ? Shortness of breath with or without chest discomfort. ? Other signs may include breaking out in a cold sweat, nausea, or lightheadedness. Don't wait more than five minutes to call [address]! Fast action can save your life. Calling 911 is almost always the fastest way to get lifesaving treatment. Emergency Medical Services staff can begin treatment when they arrive  up to an hour sooner than if someone gets to the hospital by car. The discharge information has been reviewed with the patient. The patient verbalized understanding. Discharge medications reviewed with the patient and appropriate educational materials and side effects teaching were provided. ___________________________________________________________________________________________________________________________________ Ureteral Stent Removal: What to Expect at Home Your Recovery A ureteral (say \"you-REE-ter-ul\") stent is a thin, hollow tube that was placed in your ureter to help urine pass from the kidney into the bladder. Ureters are the tubes that connect the kidneys to the bladder. There are several ways to remove the stent. It may have been removed with a string. Or you may have had a procedure to remove the stent. After the stent removal, you may need to urinate often. You may have some burning during and after urination for a day or two. It may help to drink lots of fluids (unless your doctor tells you not to). This also helps prevent a urinary tract infection. Slightly pink urine is common for several days after removal. 
Ask your doctor about medicines for pain. This care sheet gives you a general idea about how long it will take for you to recover. But each person recovers at a different pace. Follow the steps below to feel better as quickly as possible. How can you care for yourself at home? Activity ? · Rest when you feel tired. ? · Allow your body to heal. Don't move quickly or lift anything heavy until you are feeling better. ? · Most people are able to return to work the day after the procedure. If your work requires intense activity, you may feel pain in your kidney area or get tired easily. If this happens, you may need to do less strenuous activities while you heal.  
Diet ? · You can eat your normal diet. If your stomach is upset, try bland, low-fat foods like plain rice, broiled chicken, toast, and yogurt. ? · Drink plenty of fluids (unless your doctor tells you not to). Medicines ? · Your doctor will tell you if and when you can restart your medicines. He or she will also give you instructions about taking any new medicines. ? · If you take aspirin or some other blood thinner, be sure to talk to your doctor. He or she will tell you if and when to start taking this medicine again. Make sure that you understand exactly what your doctor wants you to do. ? · Be safe with medicines. Read and follow all instructions on the label. ¨ If the doctor gave you a prescription medicine for pain, take it as prescribed. ¨ If you are not taking a prescription pain medicine, ask your doctor if you can take an over-the-counter medicine. ? · If your doctor prescribed antibiotics, take them as directed. Do not stop taking them just because you feel better. You need to take the full course of antibiotics. Follow-up care is a key part of your treatment and safety. Be sure to make and go to all appointments, and call your doctor if you are having problems. It's also a good idea to know your test results and keep a list of the medicines you take. When should you call for help? Call 911 anytime you think you may need emergency care. For example, call if: 
? · You passed out (lost consciousness). ? · You have chest pain, are short of breath, or cough up blood. ?Call your doctor now or seek immediate medical care if: 
? · You have pain that does not get better after you take pain medicine. ? · You have new or more blood clots in your urine. (It is normal for the urine to be pink for a few days.) ? · You are unable to urinate. ? · You have symptoms of a urinary tract infection. These may include: 
¨ Pain or burning when you urinate. ¨ A frequent need to urinate without being able to pass much urine. ¨ Pain in the flank, which is just below the rib cage and above the waist on either side of the back. ¨ Blood in your urine. ¨ A fever. ? · You are sick to your stomach or cannot drink fluids. ? · You have signs of a blood clot in your leg (called a deep vein thrombosis), such as: 
¨ Pain in your calf, back of the knee, thigh, or groin. ¨ Redness or swelling in your leg. ? Watch closely for any changes in your health, and be sure to contact your doctor if you have any problems. Where can you learn more? Go to http://marcella-jaqui.info/. Enter Z152 in the search box to learn more about \"Ureteral Stent Removal: What to Expect at Home. \" Current as of: May 12, 2017 Content Version: 11.4 © 1570-2262 Healthwise, Incorporated. Care instructions adapted under license by [employer] (which disclaims liability or warranty for this information). If you have questions about a medical condition or this instruction, always ask your healthcare professional. [place] [address] any warranty or liability for your use of this information. Introducing \[facility]\"" & HEALTH SERVICES!    
 Francisco Mercedes introduces [employer] patient portal. Now you can access parts of your medical record, email your doctor's office, and request medication refills online. 1. In your internet browser, go to [email or web address] 2. Click on the First Time User? Click Here link in the Sign In box. You will see the New Member Sign Up page. 3. Enter your Lionical Access Code exactly as it appears below. You will not need to use this code after youve completed the sign-up process. If you do not sign up before the expiration date, you must request a new code. · [employer] Access Code: [email or web address] Expires: 5/7/2018 11:44 PM 
 
4. Enter the last four digits of your Social Security Number (xxxx) and Date of Birth (mm/dd/yyyy) as indicated and click Submit. You will be taken to the next sign-up page. 5. Create a Lionical ID. This will be your Lionical login ID and cannot be changed, so think of one that is secure and easy to remember. 6. Create a Lionical password. You can change your password at any time. 7. Enter your Password Reset Question and Answer. This can be used at a later time if you forget your password. 8. Enter your e-mail address. You will receive e-mail notification when new information is available in [address] 9. Click Sign Up. You can now view and download portions of your medical record. 10. Click the Download Summary menu link to download a portable copy of your medical information. If you have questions, please visit the Frequently Asked Questions section of the Lionical website. Remember, [employer] is NOT to be used for urgent needs. For medical emergencies, dial 911. Now available from your iPhone and Android! Unresulted Labs-Please follow up with your PCP about these lab tests Order Current Status NC XR TECHNOLOGIST SERVICE In process XR ABD (KUB) In process Providers Seen During Your Hospitalization Provider Specialty Primary office phone Roxanne Hendrickson MD Urology [phone] Your Primary Care Physician (PCP) Primary Care Physician Office Phone Office Fax NONE ** None ** ** None ** You are allergic to the following Allergen Reactions Codeine Other (comments) Pt. States \"I throw up\". Recent Documentation Height Weight BMI Smoking Status 1.778 m 77.6 kg 24.55 kg/m2 Never Smoker Emergency Contacts Name Discharge Info Relation Home Work Mobile [facility] DISCHARGE CAREGIVER [3] Spouse [3]   [phone] Patient Belongings The following personal items are in your possession at time of discharge: 
  Dental Appliances: None  Visual Aid: None      Home Medications: None   Jewelry: None  Clothing: Pants, Shirt, Footwear, Undergarments, Socks    Other Valuables: None Please provide this summary of care documentation to your next provider. Signatures-by signing, you are acknowledging that this After Visit Summary has been reviewed with you and you have received a copy. Patient Signature:  ____________________________________________________________ Date:  ____________________________________________________________  
  
Ser Officer Provider Signature:  ____________________________________________________________ Date:  ____________________________________________________________

## 2018-02-21 NOTE — PERIOP NOTES
I have reviewed discharge instructions with the patient and friend - Gee Melendrez. The patient and friend - Gee Melendrez verbalized understanding.     Patient armband removed and shredded

## 2018-02-21 NOTE — DISCHARGE INSTRUCTIONS
DISCHARGE SUMMARY from Nurse    PATIENT INSTRUCTIONS:    After general anesthesia or intravenous sedation, for 24 hours or while taking prescription Narcotics:  · Limit your activities  · Do not drive and operate hazardous machinery  · Do not make important personal or business decisions  · Do  not drink alcoholic beverages  · If you have not urinated within 8 hours after discharge, please contact your surgeon on call. Report the following to your surgeon:  · Excessive pain, swelling, redness or odor of or around the surgical area  · Temperature over 100.5  · Nausea and vomiting lasting longer than 4 hours or if unable to take medications  · Any signs of decreased circulation or nerve impairment to extremity: change in color, persistent  numbness, tingling, coldness or increase pain  · Any questions    *  Please give a list of your current medications to your Primary Care Provider. *  Please update this list whenever your medications are discontinued, doses are      changed, or new medications (including over-the-counter products) are added. *  Please carry medication information at all times in case of emergency situations. These are general instructions for a healthy lifestyle:    No smoking/ No tobacco products/ Avoid exposure to second hand smoke  Surgeon General's Warning:  Quitting smoking now greatly reduces serious risk to your health. Obesity, smoking, and sedentary lifestyle greatly increases your risk for illness    A healthy diet, regular physical exercise & weight monitoring are important for maintaining a healthy lifestyle    You may be retaining fluid if you have a history of heart failure or if you experience any of the following symptoms:  Weight gain of 3 pounds or more overnight or 5 pounds in a week, increased swelling in our hands or feet or shortness of breath while lying flat in bed.   Please call your doctor as soon as you notice any of these symptoms; do not wait until your next office visit. Recognize signs and symptoms of STROKE:    F-face looks uneven    A-arms unable to move or move unevenly    S-speech slurred or non-existent    T-time-call 911 as soon as signs and symptoms begin-DO NOT go       Back to bed or wait to see if you get better-TIME IS BRAIN. Warning Signs of HEART ATTACK     Call 911 if you have these symptoms:   Chest discomfort. Most heart attacks involve discomfort in the center of the chest that lasts more than a few minutes, or that goes away and comes back. It can feel like uncomfortable pressure, squeezing, fullness, or pain.  Discomfort in other areas of the upper body. Symptoms can include pain or discomfort in one or both arms, the back, neck, jaw, or stomach.  Shortness of breath with or without chest discomfort.  Other signs may include breaking out in a cold sweat, nausea, or lightheadedness. Don't wait more than five minutes to call 911 - MINUTES MATTER! Fast action can save your life. Calling 911 is almost always the fastest way to get lifesaving treatment. Emergency Medical Services staff can begin treatment when they arrive -- up to an hour sooner than if someone gets to the hospital by car. The discharge information has been reviewed with the patient. The patient verbalized understanding. Discharge medications reviewed with the patient and appropriate educational materials and side effects teaching were provided. ___________________________________________________________________________________________________________________________________    Ureteral Stent Removal: What to Expect at [address]    A ureteral (say \"you-REE-ter-ul\") stent is a thin, hollow tube that was placed in your ureter to help urine pass from the kidney into the bladder. Ureters are the tubes that connect the kidneys to the bladder. There are several ways to remove the stent. It may have been removed with a string.  Or you may have had a procedure to remove the stent. After the stent removal, you may need to urinate often. You may have some burning during and after urination for a day or two. It may help to drink lots of fluids (unless your doctor tells you not to). This also helps prevent a urinary tract infection. Slightly pink urine is common for several days after removal.  Ask your doctor about medicines for pain. This care sheet gives you a general idea about how long it will take for you to recover. But each person recovers at a different pace. Follow the steps below to feel better as quickly as possible. How can you care for yourself at home? Activity  ? · Rest when you feel tired. ? · Allow your body to heal. Don't move quickly or lift anything heavy until you are feeling better. ? · Most people are able to return to work the day after the procedure. If your work requires intense activity, you may feel pain in your kidney area or get tired easily. If this happens, you may need to do less strenuous activities while you heal.   Diet  ? · You can eat your normal diet. If your stomach is upset, try bland, low-fat foods like plain rice, broiled chicken, toast, and yogurt. ? · Drink plenty of fluids (unless your doctor tells you not to). Medicines  ? · Your doctor will tell you if and when you can restart your medicines. He or she will also give you instructions about taking any new medicines. ? · If you take aspirin or some other blood thinner, be sure to talk to your doctor. He or she will tell you if and when to start taking this medicine again. Make sure that you understand exactly what your doctor wants you to do. ? · Be safe with medicines. Read and follow all instructions on the label. ¨ If the doctor gave you a prescription medicine for pain, take it as prescribed. ¨ If you are not taking a prescription pain medicine, ask your doctor if you can take an over-the-counter medicine.    ? · If your doctor prescribed antibiotics, take them as directed. Do not stop taking them just because you feel better. You need to take the full course of antibiotics. Follow-up care is a key part of your treatment and safety. Be sure to make and go to all appointments, and call your doctor if you are having problems. It's also a good idea to know your test results and keep a list of the medicines you take. When should you call for help? Call 911 anytime you think you may need emergency care. For example, call if:  ? · You passed out (lost consciousness). ? · You have chest pain, are short of breath, or cough up blood. ?Call your doctor now or seek immediate medical care if:  ? · You have pain that does not get better after you take pain medicine. ? · You have new or more blood clots in your urine. (It is normal for the urine to be pink for a few days.)   ? · You are unable to urinate. ? · You have symptoms of a urinary tract infection. These may include:  ¨ Pain or burning when you urinate. ¨ A frequent need to urinate without being able to pass much urine. ¨ Pain in the flank, which is just below the rib cage and above the waist on either side of the back. ¨ Blood in your urine. ¨ A fever. ? · You are sick to your stomach or cannot drink fluids. ? · You have signs of a blood clot in your leg (called a deep vein thrombosis), such as:  ¨ Pain in your calf, back of the knee, thigh, or groin. ¨ Redness or swelling in your leg. ? Watch closely for any changes in your health, and be sure to contact your doctor if you have any problems. Where can you learn more? Go to http://marcella-jaqui.info/. Enter C972 in the search box to learn more about \"Ureteral Stent Removal: What to Expect at Home. \"  Current as of: May 12, 2017  Content Version: 11.4  © 5700-6894 Healthwise, Incorporated. Care instructions adapted under license by [employer] (which disclaims liability or warranty for this information).  If you have questions about a medical condition or this instruction, always ask your healthcare professional. [place] [address] any warranty or liability for your use of this information.

## 2018-02-21 NOTE — BRIEF OP NOTE
BRIEF OPERATIVE NOTE    Date of Procedure: 2/21/2018   Preoperative Diagnosis: Ureteral stone [N20.1]  Ureteral stent retained [Z96.0]  Postoperative Diagnosis: Ureteral stone [N20.1]  Ureteral stent retained [Z96.0]    Procedure(s):  CYSTOSCOPY URETERAL STENT REMOVAL  Surgeon(s) and Role:     * Cheyenne Boyd MD - Primary         Assistant Staff: None      Surgical Staff:  Circ-1: Ramo Koo RN  Radiology Technician: Clive Sever  Scrub Tech-1: Kavitha Nunez  Event Time In   Incision Start 0900   Incision Close 0907     Anesthesia: General   Estimated Blood Loss: 0cc  Specimens: * No specimens in log *   Findings: stent  Complications: none  Implants:   Implant Name Type Inv.  Item Serial No.  Lot No. LRB No. Used Esvin   Jimena Pretty DBL-PGTL [id number] Mariia Dee   Jimena Pretty DBL-PGTL [id number] -- C[facility] UROLOGY-[facility] [id number] Right 1 Explanted

## 2018-02-21 NOTE — ADDENDUM NOTE
Addendum  created 02/21/18 1332 by Markie Alonso, KEN    Anesthesia Intra Flowsheets edited, Anesthesia Intra Meds edited, Anesthesia Intra SmartForms edited, Flowsheet data copied forward

## 2021-12-01 ENCOUNTER — HOSPITAL ENCOUNTER (INPATIENT)
Dept: ULTRASOUND IMAGING | Age: 51
Discharge: HOME | DRG: 871 | End: 2021-12-01
Attending: INTERNAL MEDICINE
Payer: COMMERCIAL

## 2021-12-01 ENCOUNTER — APPOINTMENT (OUTPATIENT)
Dept: GENERAL RADIOLOGY | Age: 51
DRG: 871 | End: 2021-12-01
Attending: STUDENT IN AN ORGANIZED HEALTH CARE EDUCATION/TRAINING PROGRAM
Payer: COMMERCIAL

## 2021-12-01 ENCOUNTER — HOSPITAL ENCOUNTER (INPATIENT)
Age: 51
LOS: 14 days | Discharge: HOME HEALTH | DRG: 871 | End: 2021-12-15
Attending: STUDENT IN AN ORGANIZED HEALTH CARE EDUCATION/TRAINING PROGRAM | Admitting: INTERNAL MEDICINE
Payer: COMMERCIAL

## 2021-12-01 ENCOUNTER — APPOINTMENT (OUTPATIENT)
Dept: CT IMAGING | Age: 51
DRG: 871 | End: 2021-12-01
Attending: STUDENT IN AN ORGANIZED HEALTH CARE EDUCATION/TRAINING PROGRAM
Payer: COMMERCIAL

## 2021-12-01 DIAGNOSIS — R53.83 MALAISE AND FATIGUE: ICD-10-CM

## 2021-12-01 DIAGNOSIS — R53.81 MALAISE AND FATIGUE: ICD-10-CM

## 2021-12-01 DIAGNOSIS — J96.01 ACUTE RESPIRATORY FAILURE WITH HYPOXIA (HCC): ICD-10-CM

## 2021-12-01 DIAGNOSIS — R19.7 DIARRHEA OF PRESUMED INFECTIOUS ORIGIN: ICD-10-CM

## 2021-12-01 DIAGNOSIS — U07.1 COVID-19: Primary | ICD-10-CM

## 2021-12-01 PROBLEM — J12.82 PNEUMONIA DUE TO COVID-19 VIRUS: Status: ACTIVE | Noted: 2021-12-01

## 2021-12-01 PROBLEM — R09.02 HYPOXIA: Status: ACTIVE | Noted: 2021-12-01

## 2021-12-01 LAB
ALBUMIN SERPL-MCNC: 2.8 G/DL (ref 3.4–5)
ALBUMIN/GLOB SERPL: 0.8 {RATIO} (ref 0.8–1.7)
ALP SERPL-CCNC: 109 U/L (ref 45–117)
ALT SERPL-CCNC: 330 U/L (ref 16–61)
ANION GAP SERPL CALC-SCNC: 8 MMOL/L (ref 3–18)
AST SERPL-CCNC: 312 U/L (ref 10–38)
ATRIAL RATE: 97 BPM
BASOPHILS # BLD: 0 K/UL (ref 0–0.1)
BASOPHILS NFR BLD: 0 % (ref 0–2)
BILIRUB DIRECT SERPL-MCNC: 0.2 MG/DL (ref 0–0.2)
BILIRUB SERPL-MCNC: 0.5 MG/DL (ref 0.2–1)
BNP SERPL-MCNC: 76 PG/ML (ref 0–900)
BUN SERPL-MCNC: 16 MG/DL (ref 7–18)
BUN/CREAT SERPL: 18 (ref 12–20)
CALCIUM SERPL-MCNC: 8.7 MG/DL (ref 8.5–10.1)
CALCULATED P AXIS, ECG09: 34 DEGREES
CALCULATED R AXIS, ECG10: 10 DEGREES
CALCULATED T AXIS, ECG11: 25 DEGREES
CHLORIDE SERPL-SCNC: 101 MMOL/L (ref 100–111)
CO2 SERPL-SCNC: 25 MMOL/L (ref 21–32)
CREAT SERPL-MCNC: 0.9 MG/DL (ref 0.6–1.3)
CRP SERPL-MCNC: 7.8 MG/DL (ref 0–0.3)
D DIMER PPP FEU-MCNC: 1.06 UG/ML(FEU)
DIAGNOSIS, 93000: NORMAL
DIFFERENTIAL METHOD BLD: ABNORMAL
EOSINOPHIL # BLD: 0 K/UL (ref 0–0.4)
EOSINOPHIL NFR BLD: 0 % (ref 0–5)
ERYTHROCYTE [DISTWIDTH] IN BLOOD BY AUTOMATED COUNT: 12.7 % (ref 11.6–14.5)
FERRITIN SERPL-MCNC: 7446 NG/ML (ref 8–388)
FIBRINOGEN PPP-MCNC: 565 MG/DL (ref 210–451)
GLOBULIN SER CALC-MCNC: 3.5 G/DL (ref 2–4)
GLUCOSE SERPL-MCNC: 103 MG/DL (ref 74–99)
HAV IGM SER QL: NEGATIVE
HBV CORE IGM SER QL: NEGATIVE
HBV SURFACE AG SER QL: <0.1 INDEX
HBV SURFACE AG SER QL: NEGATIVE
HCT VFR BLD AUTO: 43.6 % (ref 36–48)
HCV AB SER IA-ACNC: <0.02 INDEX
HCV AB SERPL QL IA: NEGATIVE
HCV COMMENT,HCGAC: NORMAL
HGB BLD-MCNC: 14.8 G/DL (ref 13–16)
IMM GRANULOCYTES # BLD AUTO: 0 K/UL
IMM GRANULOCYTES NFR BLD AUTO: 0 %
LACTATE SERPL-SCNC: 2.1 MMOL/L (ref 0.4–2)
LDH SERPL L TO P-CCNC: 545 U/L (ref 81–234)
LYMPHOCYTES # BLD: 0.6 K/UL (ref 0.9–3.6)
LYMPHOCYTES NFR BLD: 15 % (ref 21–52)
MAGNESIUM SERPL-MCNC: 2.1 MG/DL (ref 1.6–2.6)
MCH RBC QN AUTO: 28.4 PG (ref 24–34)
MCHC RBC AUTO-ENTMCNC: 33.9 G/DL (ref 31–37)
MCV RBC AUTO: 83.7 FL (ref 78–100)
MONOCYTES # BLD: 0.1 K/UL (ref 0.05–1.2)
MONOCYTES NFR BLD: 2 % (ref 3–10)
NEUTS SEG # BLD: 3.1 K/UL (ref 1.8–8)
NEUTS SEG NFR BLD: 83 % (ref 40–73)
NRBC # BLD: 0 K/UL (ref 0–0.01)
NRBC BLD-RTO: 0 PER 100 WBC
P-R INTERVAL, ECG05: 138 MS
PLATELET # BLD AUTO: 152 K/UL (ref 135–420)
PLATELET COMMENTS,PCOM: ABNORMAL
PMV BLD AUTO: 9.3 FL (ref 9.2–11.8)
POTASSIUM SERPL-SCNC: 3.5 MMOL/L (ref 3.5–5.5)
PROCALCITONIN SERPL-MCNC: 0.36 NG/ML
PROT SERPL-MCNC: 6.3 G/DL (ref 6.4–8.2)
Q-T INTERVAL, ECG07: 326 MS
QRS DURATION, ECG06: 80 MS
QTC CALCULATION (BEZET), ECG08: 414 MS
RBC # BLD AUTO: 5.21 M/UL (ref 4.35–5.65)
RBC MORPH BLD: ABNORMAL
SODIUM SERPL-SCNC: 134 MMOL/L (ref 136–145)
SP1: NORMAL
SP2: NORMAL
SP3: NORMAL
TROPONIN I SERPL-MCNC: <0.02 NG/ML (ref 0–0.04)
VENTRICULAR RATE, ECG03: 97 BPM
WBC # BLD AUTO: 3.8 K/UL (ref 4.6–13.2)

## 2021-12-01 PROCEDURE — 94761 N-INVAS EAR/PLS OXIMETRY MLT: CPT

## 2021-12-01 PROCEDURE — 74011250636 HC RX REV CODE- 250/636: Performed by: INTERNAL MEDICINE

## 2021-12-01 PROCEDURE — 93005 ELECTROCARDIOGRAM TRACING: CPT

## 2021-12-01 PROCEDURE — 71045 X-RAY EXAM CHEST 1 VIEW: CPT

## 2021-12-01 PROCEDURE — 99285 EMERGENCY DEPT VISIT HI MDM: CPT

## 2021-12-01 PROCEDURE — 96374 THER/PROPH/DIAG INJ IV PUSH: CPT

## 2021-12-01 PROCEDURE — 74011000250 HC RX REV CODE- 250: Performed by: EMERGENCY MEDICINE

## 2021-12-01 PROCEDURE — 85025 COMPLETE CBC W/AUTO DIFF WBC: CPT

## 2021-12-01 PROCEDURE — 65660000000 HC RM CCU STEPDOWN

## 2021-12-01 PROCEDURE — 86140 C-REACTIVE PROTEIN: CPT

## 2021-12-01 PROCEDURE — 80048 BASIC METABOLIC PNL TOTAL CA: CPT

## 2021-12-01 PROCEDURE — 74011250636 HC RX REV CODE- 250/636: Performed by: EMERGENCY MEDICINE

## 2021-12-01 PROCEDURE — 86704 HEP B CORE ANTIBODY TOTAL: CPT

## 2021-12-01 PROCEDURE — 84145 PROCALCITONIN (PCT): CPT

## 2021-12-01 PROCEDURE — 83615 LACTATE (LD) (LDH) ENZYME: CPT

## 2021-12-01 PROCEDURE — 83605 ASSAY OF LACTIC ACID: CPT

## 2021-12-01 PROCEDURE — 85379 FIBRIN DEGRADATION QUANT: CPT

## 2021-12-01 PROCEDURE — XW033H5 INTRODUCTION OF TOCILIZUMAB INTO PERIPHERAL VEIN, PERCUTANEOUS APPROACH, NEW TECHNOLOGY GROUP 5: ICD-10-PCS | Performed by: INTERNAL MEDICINE

## 2021-12-01 PROCEDURE — 83735 ASSAY OF MAGNESIUM: CPT

## 2021-12-01 PROCEDURE — 81001 URINALYSIS AUTO W/SCOPE: CPT

## 2021-12-01 PROCEDURE — 74011250636 HC RX REV CODE- 250/636: Performed by: STUDENT IN AN ORGANIZED HEALTH CARE EDUCATION/TRAINING PROGRAM

## 2021-12-01 PROCEDURE — 74011000636 HC RX REV CODE- 636: Performed by: EMERGENCY MEDICINE

## 2021-12-01 PROCEDURE — 96375 TX/PRO/DX INJ NEW DRUG ADDON: CPT

## 2021-12-01 PROCEDURE — 71275 CT ANGIOGRAPHY CHEST: CPT

## 2021-12-01 PROCEDURE — 85384 FIBRINOGEN ACTIVITY: CPT

## 2021-12-01 PROCEDURE — 83880 ASSAY OF NATRIURETIC PEPTIDE: CPT

## 2021-12-01 PROCEDURE — 84484 ASSAY OF TROPONIN QUANT: CPT

## 2021-12-01 PROCEDURE — 74011000258 HC RX REV CODE- 258: Performed by: INTERNAL MEDICINE

## 2021-12-01 PROCEDURE — 82728 ASSAY OF FERRITIN: CPT

## 2021-12-01 PROCEDURE — 99223 1ST HOSP IP/OBS HIGH 75: CPT | Performed by: INTERNAL MEDICINE

## 2021-12-01 PROCEDURE — 80074 ACUTE HEPATITIS PANEL: CPT

## 2021-12-01 PROCEDURE — 76705 ECHO EXAM OF ABDOMEN: CPT

## 2021-12-01 PROCEDURE — 80076 HEPATIC FUNCTION PANEL: CPT

## 2021-12-01 RX ORDER — IPRATROPIUM BROMIDE AND ALBUTEROL SULFATE 2.5; .5 MG/3ML; MG/3ML
3 SOLUTION RESPIRATORY (INHALATION)
Status: DISCONTINUED | OUTPATIENT
Start: 2021-12-01 | End: 2021-12-15 | Stop reason: HOSPADM

## 2021-12-01 RX ORDER — DEXAMETHASONE SODIUM PHOSPHATE 4 MG/ML
6 INJECTION, SOLUTION INTRA-ARTICULAR; INTRALESIONAL; INTRAMUSCULAR; INTRAVENOUS; SOFT TISSUE ONCE
Status: COMPLETED | OUTPATIENT
Start: 2021-12-01 | End: 2021-12-01

## 2021-12-01 RX ORDER — ACETAMINOPHEN 325 MG/1
650 TABLET ORAL
Status: DISCONTINUED | OUTPATIENT
Start: 2021-12-01 | End: 2021-12-15 | Stop reason: HOSPADM

## 2021-12-01 RX ORDER — ENOXAPARIN SODIUM 100 MG/ML
30 INJECTION SUBCUTANEOUS EVERY 12 HOURS
Status: DISCONTINUED | OUTPATIENT
Start: 2021-12-01 | End: 2021-12-02

## 2021-12-01 RX ORDER — DEXAMETHASONE SODIUM PHOSPHATE 4 MG/ML
10 INJECTION, SOLUTION INTRA-ARTICULAR; INTRALESIONAL; INTRAMUSCULAR; INTRAVENOUS; SOFT TISSUE EVERY 12 HOURS
Status: DISCONTINUED | OUTPATIENT
Start: 2021-12-01 | End: 2021-12-06

## 2021-12-01 RX ORDER — SODIUM CHLORIDE 0.9 % (FLUSH) 0.9 %
5-40 SYRINGE (ML) INJECTION AS NEEDED
Status: DISCONTINUED | OUTPATIENT
Start: 2021-12-01 | End: 2021-12-02

## 2021-12-01 RX ORDER — DEXAMETHASONE SODIUM PHOSPHATE 4 MG/ML
4 INJECTION, SOLUTION INTRA-ARTICULAR; INTRALESIONAL; INTRAMUSCULAR; INTRAVENOUS; SOFT TISSUE ONCE
Status: COMPLETED | OUTPATIENT
Start: 2021-12-01 | End: 2021-12-01

## 2021-12-01 RX ORDER — SODIUM CHLORIDE 0.9 % (FLUSH) 0.9 %
5-40 SYRINGE (ML) INJECTION EVERY 8 HOURS
Status: DISCONTINUED | OUTPATIENT
Start: 2021-12-01 | End: 2021-12-03 | Stop reason: SDUPTHER

## 2021-12-01 RX ORDER — ACETAMINOPHEN 650 MG/1
650 SUPPOSITORY RECTAL
Status: DISCONTINUED | OUTPATIENT
Start: 2021-12-01 | End: 2021-12-15 | Stop reason: HOSPADM

## 2021-12-01 RX ADMIN — DEXAMETHASONE SODIUM PHOSPHATE 4 MG: 4 INJECTION, SOLUTION INTRAMUSCULAR; INTRAVENOUS at 10:56

## 2021-12-01 RX ADMIN — ENOXAPARIN SODIUM 30 MG: 100 INJECTION SUBCUTANEOUS at 11:00

## 2021-12-01 RX ADMIN — WATER 2 G: 1 INJECTION INTRAMUSCULAR; INTRAVENOUS; SUBCUTANEOUS at 10:44

## 2021-12-01 RX ADMIN — Medication 10 ML: at 14:00

## 2021-12-01 RX ADMIN — DEXAMETHASONE SODIUM PHOSPHATE 10 MG: 4 INJECTION, SOLUTION INTRA-ARTICULAR; INTRALESIONAL; INTRAMUSCULAR; INTRAVENOUS; SOFT TISSUE at 19:46

## 2021-12-01 RX ADMIN — Medication 10 ML: at 06:38

## 2021-12-01 RX ADMIN — TOCILIZUMAB 600 MG: 20 INJECTION, SOLUTION, CONCENTRATE INTRAVENOUS at 14:49

## 2021-12-01 RX ADMIN — DEXAMETHASONE SODIUM PHOSPHATE 6 MG: 4 INJECTION, SOLUTION INTRAMUSCULAR; INTRAVENOUS at 06:38

## 2021-12-01 RX ADMIN — Medication 10 ML: at 22:11

## 2021-12-01 RX ADMIN — ENOXAPARIN SODIUM 30 MG: 100 INJECTION SUBCUTANEOUS at 22:11

## 2021-12-01 RX ADMIN — IOPAMIDOL 70 ML: 755 INJECTION, SOLUTION INTRAVENOUS at 09:48

## 2021-12-01 RX ADMIN — AZITHROMYCIN DIHYDRATE 500 MG: 500 INJECTION, POWDER, LYOPHILIZED, FOR SOLUTION INTRAVENOUS at 12:40

## 2021-12-01 NOTE — ED NOTES
Patient turned over to me Dr. Jonh Harrison 79-year-old gentleman presents with acute exacerbation of Covid on day 10 of symptoms. EKG shows sinus at 97 normal axis normal intervals no ST elevation or depression.   Initial sats in the field in the 70s will likely need admission is currently on high flow resting comfortably

## 2021-12-01 NOTE — ED PROVIDER NOTES
EMERGENCY DEPARTMENT HISTORY AND PHYSICAL EXAM      Date: 12/1/2021  Patient Name: Aguila Newsome    History of Presenting Illness     No chief complaint on file. History (Context): Aguila Newsome is a 45 y.o. male with no significant past medical history comes into the ED today due to dyspnea secondary to Covid. Patient states that he began to have symptoms on November 22 and was diagnosed with Covid on November 26. He states since that time he had been feeling initially fine up until yesterday when he began to have significant dyspnea. He admits to chills, nonproductive cough, and diarrhea but otherwise denies chest pain, abdominal pain, vomiting, or nausea. He does admit to a decreased appetite however still tolerating liquids appropriately. He denies taking any medication for treatment of his symptoms. He denies having a Covid vaccination. He states symptoms are severe in quality. He admits to exacerbation of his symptoms with ambulation and exertion. Of note patient denies previous history of VTE or current anticoagulation usage. PCP: None    Current Facility-Administered Medications   Medication Dose Route Frequency Provider Last Rate Last Admin    dexamethasone (DECADRON) 4 mg/mL injection 6 mg  6 mg IntraVENous ONCE Romilda Putt, DO         Current Outpatient Medications   Medication Sig Dispense Refill    HYDROcodone-acetaminophen (XODOL) 5-300 mg tablet Take 1 Tab by mouth every four (4) hours as needed for up to 10 doses. Max Daily Amount: 6 Tabs. 10 Tab 0       Past History     Past Medical History:   Past Medical History:   Diagnosis Date    Kidney stone        Past Surgical History:  Past Surgical History:   Procedure Laterality Date    HX OTHER SURGICAL      Stent placed in Right Kidney on 2/12/2018       Family History:  No family history on file.     Social History:   Social History     Tobacco Use    Smoking status: Never Smoker    Smokeless tobacco: Never Used   Substance Use Topics    Alcohol use: No    Drug use: Yes     Types: Marijuana, Cocaine     Comment: Stopped using when he was 23years old       Allergies: Allergies   Allergen Reactions    Codeine Other (comments)     Pt. States \"I throw up\". PMH, PSH, family history, social history, allergies reviewed with the patient with significant items noted above. Review of Systems   Review of Systems   Constitutional: Positive for appetite change and fatigue. Negative for chills and fever. HENT: Negative for sore throat. Eyes: Negative for visual disturbance. Negative recent vision problems   Respiratory: Positive for cough and shortness of breath. Cardiovascular: Negative for chest pain. Gastrointestinal: Positive for diarrhea. Negative for abdominal pain, nausea and vomiting. Genitourinary: Negative for difficulty urinating. Musculoskeletal: Negative for myalgias. Skin: Negative for rash. Neurological: Negative for headaches. Negative altered level of consciousness   All other systems reviewed and are negative. Physical Exam   There were no vitals filed for this visit. Physical Exam  Vitals and nursing note reviewed. Constitutional:       General: He is in acute distress. Appearance: Normal appearance. He is ill-appearing and toxic-appearing. HENT:      Head: Normocephalic and atraumatic. Mouth/Throat:      Mouth: Mucous membranes are moist.   Eyes:      General: No scleral icterus. Conjunctiva/sclera: Conjunctivae normal.   Cardiovascular:      Rate and Rhythm: Regular rhythm. Tachycardia present. Pulmonary:      Effort: Respiratory distress present. Comments: Tachypnea with increased work of breathing  Abdominal:      General: There is no distension. Palpations: Abdomen is soft. Tenderness: There is no abdominal tenderness. There is no guarding or rebound. Musculoskeletal:         General: No deformity.  Normal range of motion. Cervical back: Normal range of motion and neck supple. Right lower leg: No edema. Left lower leg: No edema. Skin:     General: Skin is warm and dry. Findings: No rash. Neurological:      General: No focal deficit present. Mental Status: He is alert and oriented to person, place, and time. Mental status is at baseline. Psychiatric:         Mood and Affect: Mood normal.         Behavior: Behavior normal.         Diagnostic Study Results     Labs -   No results found for this or any previous visit (from the past 12 hour(s)). Labs Reviewed   CBC WITH AUTOMATED DIFF   METABOLIC PANEL, BASIC   NT-PRO BNP   TROPONIN I   MAGNESIUM       Radiologic Studies -   XR CHEST PORT    (Results Pending)   CTA CHEST W OR W WO CONT    (Results Pending)     CT Results  (Last 48 hours)    None        CXR Results  (Last 48 hours)    None          The laboratory results, imaging results, and other diagnostic exams were reviewed in the EMR. Medical Decision Making   I am the first provider for this patient. I reviewed the vital signs, available nursing notes, past medical history, past surgical history, family history and social history. Vital Signs-Reviewed the patient's vital signs. Records Reviewed: Personally, on initial evaluation    MDM:   Bo Small presents with complaint of dyspnea secondary to Covid  DDX includes but is not limited to: Covid pneumonia, pneumonia, PE    Patient overall in acute distress, ill-appearing, and with significant increased work of breathing despite being on 6 L nasal cannula. Will obtain lab work and imaging for further evaluation of patients complaint. Will provide patient with IV Decadron for further treatment of his symptoms. Will place patient on high flow nasal cannula for further improvement of his work of breathing. Will continue to monitor and evaluate patient while in the ED.       Orders as below:  Orders Placed This Encounter  XR CHEST PORT    CTA CHEST W OR W WO CONT    CBC WITH AUTOMATED DIFF    BASIC METABOLIC PANEL    PRO-BNP    TROPONIN I    MAGNESIUM    EKG, 12 LEAD, INITIAL    dexamethasone (DECADRON) 4 mg/mL injection 6 mg        ED Course:        6:07 AM : Pt care transferred to Dr. Luis Carlos Honeycutt  ,ED provider. History of patient complaint(s), available diagnostic reports and current treatment plan has been discussed thoroughly. Bedside rounding on patient occured : no . Intended disposition of patient : Admit  Pending diagnostics reports and/or labs (please list): labs, imaging    Dr. Lazarus Neat assistance in completion of this plan is greatly appreciated but it should be noted that I will be the provider of record for this patient. Procedures:  Procedures        Diagnosis and Disposition     CLINICAL IMPRESSION:  No diagnosis found. Current Discharge Medication List          Disposition: Admit    Patient condition at time of disposition:         Critical Care Time:   The services I provided to this patient were to treat and/or prevent clinically significant deterioration that could result in the failure of one or more body systems and/or organ systems due to Hypoxia/Hypoxemia. Services included the following:  -reviewing nursing notes and old charts  -vital sign assessments  -direct patient care  -medication orders and management  -interpreting and reviewing diagnostic studies/labs  -re-evaluations  -documentation time    Aggregate critical care time was 32 minutes, which includes only time during which I was engaged in work directly related to the patient's care as described above, whether I was at bedside or elsewhere in the Emergency Department. It did not include time spent performing other reported procedures or the services of residents, students, nurses, or advance practice providers.        Kashif Lee D.O.    5:23 AM          Dragon Disclaimer     Please note that this dictation was completed with Dragon, the computer voice recognition software. Quite often unanticipated grammatical, syntax, homophones, and other interpretive errors are inadvertently transcribed by the computer software. Please disregard these errors. Please excuse any errors that have escaped final proofreading. Mercedes CAMPOS.

## 2021-12-01 NOTE — H&P
Date of Admission: 12/1/2021      Assessment:   Acute hypoxic respiratory failure:  Currently on HF at 40L   - 12/1 CXR: low lung volumes, multifocal streaky opacities   - 12/1 CTA chest: negative for PE, mixed peripheral upper and right middle lobe ground glass opacities, b/l LL infiltrates. No effusions  COVID pneumonia:   Symptoms since 11/22. Dx on 11/26. Elevated ferritin, CRP, LDH  Leukopenia:  With lymphopenia  Mildly elevated d-dimer  Transaminitis    Plan:   Continue supportive O2- currently on HF at 40L. Consider ancillary COVID-19 supportive therapies: Vit C, Vit D, zinc, melatonin  Droplet + precautions   Continue Ceftriaxone and azithromycin for now- may adjust based on cultures and procalcitonin  F/u blood cx from 12/1    Continue O2NC; titrate as needed to maintain sats>92  Duonebs prn  Aspiration precautions  Monitor inflammatory markers: LDH, d-dimer, troponin, CPK, ferritin  CBC, CMP  Liver US  Remdesivir not indicated due to duration of symptoms  Continue dexamethasone x 10 days total    Lovenox 40 mg IV daily for DVT prophylaxis   SSI and Accuchecks for tight glucose control while on decadron. Brad Kaur D.O. Internal Medicine and Infectious Diseases      Subjective:    Patient is a 45 y.o.male who is being evaluated for shortness of breath. Mr. Silva Brock is a pleasant 42-year-old gentleman with a past medical history significant for nephrolithiasis who is presenting to the emergency department after increasing shortness of breath. He states that pulmonary embolism her started to have cough and upper respiratory type symptoms on 11/22/2021. He had subsequently been tested and was diagnosed with Covid infection on November 26. Overall he has been doing fairly okay with minimal symptoms. Within the last 24 hours however he started to develop increasing dyspnea and shortness of breath. He has an intermittent dry cough.   Denies any current fevers although he had some low-grade past.  Patient is unvaccinated    ED events: Temp 99.3 pulse 101 currently on high flow with sepsis between 98 and 99% respiratory rate 31. WBC 3.8 with lymphopenia. Sodium 134, albumin 2.8 CRP 7.8 ferritin 7446, . CRP 7.8    Past Medical History:   Diagnosis Date    Kidney stone      Past Surgical History:   Procedure Laterality Date    HX OTHER SURGICAL      Stent placed in Right Kidney on 2/12/2018     History reviewed. No pertinent family history. Medications reviewed as below:   Current Facility-Administered Medications   Medication Dose Route Frequency Provider Last Rate Last Admin    sodium chloride (NS) flush 5-40 mL  5-40 mL IntraVENous Q8H Jerilyn Orozco MD   10 mL at 12/01/21 4717    sodium chloride (NS) flush 5-40 mL  5-40 mL IntraVENous PRN Jerilyn Orozco MD        cefTRIAXone (ROCEPHIN) 2 g in sterile water (preservative free) 20 mL IV syringe  2 g IntraVENous Q24H Jerilyn Orozco MD        azithromycin (ZITHROMAX) 500 mg in 0.9% sodium chloride 250 mL (VIAL-MATE)  500 mg IntraVENous Q24H Jerilyn Orozco MD        enoxaparin (LOVENOX) injection 30 mg  30 mg SubCUTAneous Q12H Jerilyn Orozco MD        acetaminophen (TYLENOL) tablet 650 mg  650 mg Oral Q6H PRN Jerilyn Orozco MD        Or    acetaminophen (TYLENOL) suppository 650 mg  650 mg Rectal Q6H PRN Jerilyn Orozco MD        dexamethasone (DECADRON) 4 mg/mL injection 4 mg  4 mg IntraVENous ONCE Jerilyn Orozco MD         Allergies   Allergen Reactions    Codeine Other (comments)     Pt. States \"I throw up\".      Social History     Socioeconomic History    Marital status:      Spouse name: Not on file    Number of children: Not on file    Years of education: Not on file    Highest education level: Not on file   Occupational History    Not on file   Tobacco Use    Smoking status: Never Smoker    Smokeless tobacco: Never Used   Substance and Sexual Activity    Alcohol use: No    Drug use: Not Currently     Types: Marijuana, Cocaine     Comment: Stopped using when he was 23years old    Sexual activity: Yes   Other Topics Concern    Not on file   Social History Narrative    Not on file     Social Determinants of Health     Financial Resource Strain:     Difficulty of Paying Living Expenses: Not on file   Food Insecurity:     Worried About Running Out of Food in the Last Year: Not on file    Lindsay of Food in the Last Year: Not on file   Transportation Needs:     Lack of Transportation (Medical): Not on file    Lack of Transportation (Non-Medical): Not on file   Physical Activity:     Days of Exercise per Week: Not on file    Minutes of Exercise per Session: Not on file   Stress:     Feeling of Stress : Not on file   Social Connections:     Frequency of Communication with Friends and Family: Not on file    Frequency of Social Gatherings with Friends and Family: Not on file    Attends Latter-day Services: Not on file    Active Member of [address] [place] [address] or Organizations: Not on file    Attends Club or Organization Meetings: Not on file    Marital Status: Not on file   Intimate Partner Violence:     Fear of Current or Ex-Partner: Not on file    Emotionally Abused: Not on file    Physically Abused: Not on file    Sexually Abused: Not on file   Housing Stability:     Unable to Pay for Housing in the Last Year: Not on file    Number of [place] in the Last Year: Not on file    Unstable Housing in the Last Year: Not on file        Review of Systems    Negative Unless BOLDED    General: fevers, chills, myalgias, arthralgias, unexplained weight loss, malaise, fatigue. HEENT:  headaches,sinus pain or presure, recent URI, recent dental procedures;  tinnitus, hearing loss , visual changes, catarats, dizziness or blurred vision  PUlMONARY:  cough , shortness of breath, sputum production, hx of asthma or COPD. previous treatement for TB or PPD.   Cardiovascular: chest pain, previous CAD/MI, vavlular heart disease,  murmurs  GI:   nausea, vomiting, diarrhea, abdominal pain, prior C.diff  :  urinary frequency, dysuria, hematuria, bladder incontinence. Neurologic:  seizures, syncope or prior CVA/TIA, confusion, memory impairment, neuropathy  Musculoskeletal:  myalgias arthralgias, joint pain/ swelling,  back pain  Skin:  Purities,  recurrent cellulitis,  chronic stasis ulcer, diabetic foot ulcers  Endocrine: polyuria, polydipsia, hair loss, weight gain  Psych: Denies depression or treatment by a psychiatrist/psycologist  Heme-Onc: prior DVT, easy bruising, fatigue, malignancy        Objective:        Visit Vitals  /76   Pulse 97   Temp 99.3 °F (37.4 °C)   Resp (!) 31   Ht 5' 9\" (1.753 m)   Wt 81.6 kg (180 lb)   SpO2 99%   BMI 26.58 kg/m²     Temp (24hrs), Av.3 °F (37.4 °C), Min:99.3 °F (37.4 °C), Max:99.3 °F (37.4 °C)        General:   awake alert and oriented   Skin:   no rashes or skin lesions noted on limited exam   HEENT:  Normocephalic, atraumatic, PERRL, EOMI, no scleral icterus or pallor; no conjunctival hemmohage;  nasal and oral mucous are moist and without evidence of lesions. No thrush. Dentition good. Neck supple, no bruits. Lymph Nodes:   no cervical, axillary or inguinal adenopathy   Lungs:   non-labored, bilaterally clear to aspiration- no crackles wheezes rales or rhonchi   Heart:  RRR, s1 and s2; no murmurs rubs or gallops, no edema, + pedal pulses   Abdomen:  soft, non-distended, active bowel sounds, no hepatomegaly, no splenomegaly. Appropriate surgical scars for stated surgeries. Non-tender   Genitourinary:  deferred   Extremities:   no clubbing, cyanosis; no joint effusions or swelling; Full ROM of all large joints to the upper and lower extremities; muscle mass appropriate for age   Neurologic:  No gross focal sensory abnormalities; 5/5 muscle strength to upper and lower extremities. Speech appropirate. Cranial nerves intact   Psychiatric:   appropriate and interactive. Labs: Results:   Chemistry Recent Labs     12/01/21  0640   *   *   K 3.5      CO2 25   BUN 16   CREA 0.90   CA 8.7   AGAP 8   BUCR 18      CBC w/Diff Recent Labs     12/01/21  0640   WBC 3.8*   RBC 5.21   HGB 14.8   HCT 43.6      GRANS 83*   LYMPH 15*   EOS 0            No results found for: SDES Lab Results   Component Value Date/Time    Culture result: NO GROWTH 2 DAYS 02/10/2018 05:30 PM          Imaging:      All imaging reviewed from Admission to present as per radiology interpretation in [address] [place] [address]

## 2021-12-01 NOTE — PROGRESS NOTES
12/01/21 1506   Oxygen Therapy   O2 Sat (%) 99 %   Pulse via Oximetry 88 beats per minute   O2 Device Heated; Hi flow nasal cannula   O2 Flow Rate (L/min) 35 l/min   O2 Temperature 91.4 °F (33 °C)   FIO2 (%) 65 %     Spo2 99%. Oxygen titrated to 35 lpm and Fio2 65%.

## 2021-12-01 NOTE — CONSULTS
Infectious Disease Consultation Note        Reason: Acute hypoxic respiratory failure, confirmed COVID-19 pneumonia    Current abx Prior abx   Ceftriaxone, azithromycin since 12/1/2021      Lines:       Assessment :    56-year-old man with no significant past medical history admitted to [facility] on 12/1/2021 with increasing shortness of breath for 2 to 3 days. Symptoms of COVID-19 since 11/22/2021  Positive Covid test 11/26/2021    Sudden worsening hypoxia with oxygen saturation in 70s on presentation  Rapidly worsening oxygenation now on high flow oxygen at 40 L, tachypnea, elevated ferritin- 7000s, crp: 7.8    Clinical presentation consistent with acute hypoxic respiratory failure-present on admission due to severe COVID-19 pneumonia, evolving cytokine storm    Mild elevated procalcitonin-could be secondary to severe COVID-19 pneumonia. Will monitor for superimposed bacterial infection    Mild elevated [id number] due to severe COVID-19 pneumonia. No evidence of pulmonary bolus him noted on CTA chest 12/1/2021    Unvaccinated patient-high risk for clinical decompensation. Patient currently wishes to remain full code. Will escalate care in attempt to prevent further decompensation    Recommendations:    1. Increase Decadron to 10 mg IV every 12 hours. Start Tocilizumab  2. Continue ceftriaxone, azithromycin for now  3. Monitor CRP, D-dimer, procalcitonin  4. Titrate oxygen as tolerated  5. Prophylactic anticoagulation per primary team    Thank you for consultation request. Above plan was discussed in details with patient,RN and dr Ann Marie Harris. Please call me if any further questions or concerns. Will continue to participate in the care of this patient. HPI:    56-year-old man with no significant past medical history admitted to [facility] on 12/1/2021 with increasing shortness of breath. Patient states that he began feeling tired and had upper respiratory symptoms on 11/22/2021.   He subsequently tested positive for Covid on November 26. He contacted his primary care physician who advised him to monitor her oxygen saturation at home. He was advised to come to emergency room if his oxygen saturation drops below 90%. Since the past 2 to 3 days he started having increasing shortness of breath. Yesterday morning his oxygen saturation was above 90. However yesterday evening his oxygen saturation dropped in the 80s. He came to emergency room for further evaluation. He was put on high flow oxygen. He was given IV steroids. I was consulted for further recommendations. Of note CTA chest was negative for pulmonary embolism. Patient denies any pleuritic chest pain. Has dry cough. Had diarrhea at home. Past Medical History:   Diagnosis Date    Kidney stone        Past Surgical History:   Procedure Laterality Date    HX OTHER SURGICAL      Stent placed in Right Kidney on 2/12/2018       Patient's Medications   Start Taking    No medications on file   Continue Taking    No medications on file   These Medications have changed    No medications on file   Stop Taking    HYDROCODONE-ACETAMINOPHEN (XODOL) 5-300 MG TABLET    Take 1 Tab by mouth every four (4) hours as needed for up to 10 doses. Max Daily Amount: 6 Tabs.        Current Facility-Administered Medications   Medication Dose Route Frequency    sodium chloride (NS) flush 5-40 mL  5-40 mL IntraVENous Q8H    sodium chloride (NS) flush 5-40 mL  5-40 mL IntraVENous PRN    cefTRIAXone (ROCEPHIN) 2 g in sterile water (preservative free) 20 mL IV syringe  2 g IntraVENous Q24H    azithromycin (ZITHROMAX) 500 mg in 0.9% sodium chloride 250 mL (VIAL-MATE)  500 mg IntraVENous Q24H    enoxaparin (LOVENOX) injection 30 mg  30 mg SubCUTAneous Q12H    acetaminophen (TYLENOL) tablet 650 mg  650 mg Oral Q6H PRN    Or    acetaminophen (TYLENOL) suppository 650 mg  650 mg Rectal Q6H PRN    dexamethasone (DECADRON) 4 mg/mL injection 4 mg  4 mg IntraVENous ONCE     No current outpatient medications on file. Allergies: Codeine    History reviewed. No pertinent family history. Social History     Socioeconomic History    Marital status:      Spouse name: Not on file    Number of children: Not on file    Years of education: Not on file    Highest education level: Not on file   Occupational History    Not on file   Tobacco Use    Smoking status: Never Smoker    Smokeless tobacco: Never Used   Substance and Sexual Activity    Alcohol use: No    Drug use: Not Currently     Types: Marijuana, Cocaine     Comment: Stopped using when he was 23years old    Sexual activity: Yes   Other Topics Concern    Not on file   Social History Narrative    Not on file     Social Determinants of Health     Financial Resource Strain:     Difficulty of Paying Living Expenses: Not on file   Food Insecurity:     Worried About Running Out of Food in the Last Year: Not on file    [place] of Food in the Last Year: Not on file   Transportation Needs:     Lack of Transportation (Medical): Not on file    Lack of Transportation (Non-Medical):  Not on file   Physical Activity:     Days of Exercise per Week: Not on file    Minutes of Exercise per Session: Not on file   Stress:     Feeling of Stress : Not on file   Social Connections:     Frequency of Communication with Friends and Family: Not on file    Frequency of Social Gatherings with Friends and Family: Not on file    Attends Confucianist Services: Not on file    Active Member of Clubs or Organizations: Not on file    Attends Club or Organization Meetings: Not on file    Marital Status: Not on file   Intimate Partner Violence:     Fear of Current or Ex-Partner: Not on file    Emotionally Abused: Not on file    Physically Abused: Not on file    Sexually Abused: Not on file   Housing Stability:     Unable to Pay for Housing in the Last Year: Not on file    Number of [place] in the Last Year: Not on file    Unstable Housing in the Last Year: Not on file     Social History     Tobacco Use   Smoking Status Never Smoker   Smokeless Tobacco Never Used        Temp (24hrs), Av.3 °F (37.4 °C), Min:99.3 °F (37.4 °C), Max:99.3 °F (37.4 °C)    Visit Vitals  /76   Pulse 97   Temp 99.3 °F (37.4 °C)   Resp (!) 31   Ht 5' 9\" (1.753 m)   Wt 81.6 kg (180 lb)   SpO2 99%   BMI 26.58 kg/m²       ROS: 12 point ROS obtained in details. Pertinent positives as mentioned in HPI,   otherwise negative    Physical Exam:    Vitals signs and nursing note reviewed. Constitutional:       General: He is not in acute distress. Appearance: He is well-developed. HENT:      Head: Normocephalic. Eyes:      Conjunctiva/sclera: Conjunctivae normal.      Neck:      Musculoskeletal: Normal range of motion and neck supple. Cardiovascular:      Rate and Rhythm: Normal rate and regular rhythm on monitor  Chest:      Bilateral chest movements equal.  Auscultation deferred due to Covid positive  Abdominal:      General: There is no distension. Palpations: Abdomen is soft. Tenderness: There is no abdominal tenderness. There is no rebound. Musculoskeletal: Normal range of motion. General: No tenderness. Skin:     General: Skin is warm and dry. Findings: No rash. Neurological:      Mental Status: He is alert and oriented to person, place, and time. Cranial Nerves: No cranial nerve deficit. Motor: No abnormal muscle tone. Coordination: Coordination normal.   Psychiatric:         Behavior: Behavior normal.         Thought Content:  Thought content normal.         Judgment: Judgment normal.       Labs: Results:   Chemistry Recent Labs     21  0640   *   *   K 3.5      CO2 25   BUN 16   CREA 0.90   CA 8.7   AGAP 8   BUCR 18      CBC w/Diff Recent Labs     21  0640   WBC 3.8*   RBC 5.21   HGB 14.8   HCT 43.6      GRANS 83*   LYMPH 15*   EOS 0      Microbiology No results for input(s): CULT in the last 72 hours. RADIOLOGY:    All available imaging studies/reports in [facility] for this admission were reviewed      Disclaimer: Sections of this note are dictated utilizing voice recognition software, which may have resulted in some phonetic based errors in grammar and contents. Even though attempts were made to correct all the mistakes, some may have been missed, and remained in the body of the document. If questions arise, please contact our department.     Dr. Fatimah Clemons, Infectious Disease Specialist  [phone]  December 1, 2021  11:26 AM

## 2021-12-02 LAB
ALBUMIN SERPL-MCNC: 2.8 G/DL (ref 3.4–5)
ALBUMIN/GLOB SERPL: 0.8 {RATIO} (ref 0.8–1.7)
ALP SERPL-CCNC: 116 U/L (ref 45–117)
ALT SERPL-CCNC: 292 U/L (ref 16–61)
ANION GAP SERPL CALC-SCNC: 8 MMOL/L (ref 3–18)
APPEARANCE UR: CLEAR
AST SERPL-CCNC: 256 U/L (ref 10–38)
BACTERIA URNS QL MICRO: ABNORMAL /HPF
BASOPHILS # BLD: 0 K/UL (ref 0–0.1)
BASOPHILS NFR BLD: 0 % (ref 0–2)
BILIRUB SERPL-MCNC: 0.6 MG/DL (ref 0.2–1)
BILIRUB UR QL: NEGATIVE
BUN SERPL-MCNC: 27 MG/DL (ref 7–18)
BUN/CREAT SERPL: 28 (ref 12–20)
CALCIUM SERPL-MCNC: 8.5 MG/DL (ref 8.5–10.1)
CHLORIDE SERPL-SCNC: 103 MMOL/L (ref 100–111)
CO2 SERPL-SCNC: 26 MMOL/L (ref 21–32)
COLOR UR: YELLOW
CREAT SERPL-MCNC: 0.97 MG/DL (ref 0.6–1.3)
CRP SERPL-MCNC: 3.3 MG/DL (ref 0–0.3)
D DIMER PPP FEU-MCNC: 0.59 UG/ML(FEU)
DIFFERENTIAL METHOD BLD: ABNORMAL
EOSINOPHIL # BLD: 0 K/UL (ref 0–0.4)
EOSINOPHIL NFR BLD: 0 % (ref 0–5)
EPITH CASTS URNS QL MICRO: ABNORMAL /LPF (ref 0–5)
ERYTHROCYTE [DISTWIDTH] IN BLOOD BY AUTOMATED COUNT: 12.7 % (ref 11.6–14.5)
GLOBULIN SER CALC-MCNC: 3.7 G/DL (ref 2–4)
GLUCOSE BLD STRIP.AUTO-MCNC: 135 MG/DL (ref 70–110)
GLUCOSE SERPL-MCNC: 138 MG/DL (ref 74–99)
GLUCOSE UR STRIP.AUTO-MCNC: NEGATIVE MG/DL
HBV CORE AB SERPL QL IA: NEGATIVE
HCT VFR BLD AUTO: 43.1 % (ref 36–48)
HGB BLD-MCNC: 14.7 G/DL (ref 13–16)
HGB UR QL STRIP: ABNORMAL
IMM GRANULOCYTES # BLD AUTO: 0 K/UL
IMM GRANULOCYTES NFR BLD AUTO: 0 %
KETONES UR QL STRIP.AUTO: ABNORMAL MG/DL
LEUKOCYTE ESTERASE UR QL STRIP.AUTO: NEGATIVE
LYMPHOCYTES # BLD: 0.4 K/UL (ref 0.9–3.6)
LYMPHOCYTES NFR BLD: 10 % (ref 21–52)
MCH RBC QN AUTO: 28.7 PG (ref 24–34)
MCHC RBC AUTO-ENTMCNC: 34.1 G/DL (ref 31–37)
MCV RBC AUTO: 84 FL (ref 78–100)
MONOCYTES # BLD: 0.4 K/UL (ref 0.05–1.2)
MONOCYTES NFR BLD: 10 % (ref 3–10)
NEUTS SEG # BLD: 3.6 K/UL (ref 1.8–8)
NEUTS SEG NFR BLD: 80 % (ref 40–73)
NITRITE UR QL STRIP.AUTO: NEGATIVE
NRBC # BLD: 0 K/UL (ref 0–0.01)
NRBC BLD-RTO: 0 PER 100 WBC
PH UR STRIP: 6 [PH] (ref 5–8)
PLATELET # BLD AUTO: 235 K/UL (ref 135–420)
PLATELET COMMENTS,PCOM: ABNORMAL
PMV BLD AUTO: 9.6 FL (ref 9.2–11.8)
POTASSIUM SERPL-SCNC: 3.8 MMOL/L (ref 3.5–5.5)
PROCALCITONIN SERPL-MCNC: 0.2 NG/ML
PROT SERPL-MCNC: 6.5 G/DL (ref 6.4–8.2)
PROT UR STRIP-MCNC: 30 MG/DL
RBC # BLD AUTO: 5.13 M/UL (ref 4.35–5.65)
RBC #/AREA URNS HPF: ABNORMAL /HPF (ref 0–5)
RBC MORPH BLD: ABNORMAL
SODIUM SERPL-SCNC: 137 MMOL/L (ref 136–145)
SP GR UR REFRACTOMETRY: 1.02 (ref 1–1.03)
TROPONIN I SERPL-MCNC: <0.02 NG/ML (ref 0–0.04)
UROBILINOGEN UR QL STRIP.AUTO: 1 EU/DL (ref 0.2–1)
WBC # BLD AUTO: 4.4 K/UL (ref 4.6–13.2)
WBC URNS QL MICRO: ABNORMAL /HPF (ref 0–4)

## 2021-12-02 PROCEDURE — 85025 COMPLETE CBC W/AUTO DIFF WBC: CPT

## 2021-12-02 PROCEDURE — 84145 PROCALCITONIN (PCT): CPT

## 2021-12-02 PROCEDURE — 77010033711 HC HIGH FLOW OXYGEN

## 2021-12-02 PROCEDURE — APPSS45 APP SPLIT SHARED TIME 31-45 MINUTES: Performed by: NURSE PRACTITIONER

## 2021-12-02 PROCEDURE — 74011250636 HC RX REV CODE- 250/636: Performed by: INTERNAL MEDICINE

## 2021-12-02 PROCEDURE — 94761 N-INVAS EAR/PLS OXIMETRY MLT: CPT

## 2021-12-02 PROCEDURE — 36415 COLL VENOUS BLD VENIPUNCTURE: CPT

## 2021-12-02 PROCEDURE — 86140 C-REACTIVE PROTEIN: CPT

## 2021-12-02 PROCEDURE — 84484 ASSAY OF TROPONIN QUANT: CPT

## 2021-12-02 PROCEDURE — 65660000000 HC RM CCU STEPDOWN

## 2021-12-02 PROCEDURE — 80053 COMPREHEN METABOLIC PANEL: CPT

## 2021-12-02 PROCEDURE — 87040 BLOOD CULTURE FOR BACTERIA: CPT

## 2021-12-02 PROCEDURE — 82962 GLUCOSE BLOOD TEST: CPT

## 2021-12-02 PROCEDURE — 74011250637 HC RX REV CODE- 250/637: Performed by: INTERNAL MEDICINE

## 2021-12-02 PROCEDURE — 94762 N-INVAS EAR/PLS OXIMTRY CONT: CPT

## 2021-12-02 PROCEDURE — 99232 SBSQ HOSP IP/OBS MODERATE 35: CPT | Performed by: INTERNAL MEDICINE

## 2021-12-02 PROCEDURE — 74011000250 HC RX REV CODE- 250: Performed by: INTERNAL MEDICINE

## 2021-12-02 PROCEDURE — 2709999900 HC NON-CHARGEABLE SUPPLY

## 2021-12-02 PROCEDURE — 85379 FIBRIN DEGRADATION QUANT: CPT

## 2021-12-02 RX ORDER — ACETAMINOPHEN 650 MG/1
650 SUPPOSITORY RECTAL
Status: DISCONTINUED | OUTPATIENT
Start: 2021-12-02 | End: 2021-12-02 | Stop reason: SDUPTHER

## 2021-12-02 RX ORDER — POLYETHYLENE GLYCOL 3350 17 G/17G
17 POWDER, FOR SOLUTION ORAL DAILY PRN
Status: DISCONTINUED | OUTPATIENT
Start: 2021-12-02 | End: 2021-12-14

## 2021-12-02 RX ORDER — ACETAMINOPHEN 325 MG/1
650 TABLET ORAL
Status: DISCONTINUED | OUTPATIENT
Start: 2021-12-02 | End: 2021-12-02 | Stop reason: SDUPTHER

## 2021-12-02 RX ORDER — SODIUM CHLORIDE 0.9 % (FLUSH) 0.9 %
5-40 SYRINGE (ML) INJECTION AS NEEDED
Status: DISCONTINUED | OUTPATIENT
Start: 2021-12-02 | End: 2021-12-15 | Stop reason: HOSPADM

## 2021-12-02 RX ORDER — DEXAMETHASONE SODIUM PHOSPHATE 4 MG/ML
6 INJECTION, SOLUTION INTRA-ARTICULAR; INTRALESIONAL; INTRAMUSCULAR; INTRAVENOUS; SOFT TISSUE EVERY 24 HOURS
Status: DISCONTINUED | OUTPATIENT
Start: 2021-12-02 | End: 2021-12-02

## 2021-12-02 RX ORDER — ENOXAPARIN SODIUM 100 MG/ML
40 INJECTION SUBCUTANEOUS DAILY
Status: DISCONTINUED | OUTPATIENT
Start: 2021-12-02 | End: 2021-12-15 | Stop reason: HOSPADM

## 2021-12-02 RX ORDER — SODIUM CHLORIDE 0.9 % (FLUSH) 0.9 %
5-40 SYRINGE (ML) INJECTION EVERY 8 HOURS
Status: DISCONTINUED | OUTPATIENT
Start: 2021-12-02 | End: 2021-12-15 | Stop reason: HOSPADM

## 2021-12-02 RX ORDER — ONDANSETRON 4 MG/1
4 TABLET, ORALLY DISINTEGRATING ORAL
Status: DISCONTINUED | OUTPATIENT
Start: 2021-12-02 | End: 2021-12-15 | Stop reason: HOSPADM

## 2021-12-02 RX ORDER — ONDANSETRON 2 MG/ML
4 INJECTION INTRAMUSCULAR; INTRAVENOUS
Status: DISCONTINUED | OUTPATIENT
Start: 2021-12-02 | End: 2021-12-15 | Stop reason: HOSPADM

## 2021-12-02 RX ORDER — FAMOTIDINE 20 MG/1
20 TABLET, FILM COATED ORAL 2 TIMES DAILY
Status: DISCONTINUED | OUTPATIENT
Start: 2021-12-02 | End: 2021-12-15 | Stop reason: HOSPADM

## 2021-12-02 RX ADMIN — FAMOTIDINE 20 MG: 20 TABLET ORAL at 17:32

## 2021-12-02 RX ADMIN — Medication 10 ML: at 05:07

## 2021-12-02 RX ADMIN — WATER 2 G: 1 INJECTION INTRAMUSCULAR; INTRAVENOUS; SUBCUTANEOUS at 12:39

## 2021-12-02 RX ADMIN — AZITHROMYCIN DIHYDRATE 500 MG: 500 INJECTION, POWDER, LYOPHILIZED, FOR SOLUTION INTRAVENOUS at 12:39

## 2021-12-02 RX ADMIN — Medication 10 ML: at 22:08

## 2021-12-02 RX ADMIN — DEXAMETHASONE SODIUM PHOSPHATE 10 MG: 4 INJECTION, SOLUTION INTRA-ARTICULAR; INTRALESIONAL; INTRAMUSCULAR; INTRAVENOUS; SOFT TISSUE at 22:06

## 2021-12-02 RX ADMIN — FAMOTIDINE 20 MG: 20 TABLET ORAL at 12:39

## 2021-12-02 RX ADMIN — ENOXAPARIN SODIUM 40 MG: 100 INJECTION SUBCUTANEOUS at 12:38

## 2021-12-02 RX ADMIN — Medication 10 ML: at 12:40

## 2021-12-02 RX ADMIN — DEXAMETHASONE SODIUM PHOSPHATE 10 MG: 4 INJECTION, SOLUTION INTRA-ARTICULAR; INTRALESIONAL; INTRAMUSCULAR; INTRAVENOUS; SOFT TISSUE at 12:39

## 2021-12-02 NOTE — ROUTINE PROCESS
TRANSFER - IN REPORT:    Verbal report received from Altierre (name) on Spencer Thomas  being received from ED (unit) for change in patient condition(Hi[facility] NC)      Report consisted of patients Situation, Background, Assessment and   Recommendations(SBAR). Information from the following report(s) SBAR, Kardex, Intake/Output, MAR and Recent Results was reviewed with the receiving nurse. Opportunity for questions and clarification was provided. Assessment completed upon patients arrival to unit and care assumed.

## 2021-12-02 NOTE — PROGRESS NOTES
Reason for Admission:  Pneumonia due to COVID-19 virus [U07.1, J12.82]  Hypoxia [R09.02]                 RUR Score:    9%           Plan for utilizing home health:    No orders at this time. Likelihood of Readmission:   LOW                         Transition of Care Plan:              Initial assessment completed with patient. Cognitive status of patient: oriented to time, place, person and situation. Face sheet information confirmed:  yes. The patient designates Emilie Newsome, spouse ([phone]) to participate in his discharge plan and to receive any needed information. This patient lives in a single family home with spouse and children. Patient was able to navigate steps as needed. Prior to hospitalization, patient was considered to be independent with ADLs/IADLS : yes . Patient has a current ACP document on file: no      The patient's spouse will be available to transport patient home upon discharge. The patient reported no medical equipment available in the home. Patient is not currently active with home health. Patient has not stayed in a skilled nursing facility or rehab. This patient is on dialysis :no     Freedom of choice signed: yes,    . Currently, the discharge plan is Home. - Will need to be assess for home oxygen prior to discharge. -  Will need PCP setup prior to discharge. CM will continue to monitor and assist with transition of care needs. The patient states that he can obtain his medications from the pharmacy, and take his medications as directed.     Patient's current insurance is [employer]       Care Management Interventions  Mode of Transport at Discharge: Self  Transition of Care Consult (CM Consult): Discharge Planning  Discharge Durable Medical Equipment: No  Physical Therapy Consult: No  Occupational Therapy Consult: No  Speech Therapy Consult: No  Support Systems: Spouse/Significant Other  Confirm Follow Up Transport: Self  Discharge Location  Discharge Placement: Home with family assistance        HERBIE TorresN, RN  Pager # [phone]  Care Manager

## 2021-12-02 NOTE — PROGRESS NOTES
Problem: Risk for Spread of Infection  Goal: Prevent transmission of infectious organism to others  Description: Prevent the transmission of infectious organisms to other patients, staff members, and visitors. Outcome: Progressing Towards Goal     Problem: Patient Education:  Go to Education Activity  Goal: Patient/Family Education  Outcome: Progressing Towards Goal     Problem: Falls - Risk of  Goal: *Absence of Falls  Description: Document [place] Fall Risk and appropriate interventions in the flowsheet. Outcome: Progressing Towards Goal  Note: Fall Risk Interventions:                                Problem: Patient Education: Go to Patient Education Activity  Goal: Patient/Family Education  Outcome: Progressing Towards Goal     Problem: Pain  Goal: *Control of Pain  Outcome: Progressing Towards Goal  Goal: *PALLIATIVE CARE:  Alleviation of Pain  Outcome: Progressing Towards Goal     Problem: Patient Education: Go to Patient Education Activity  Goal: Patient/Family Education  Outcome: Progressing Towards Goal     Problem: Pressure Injury - Risk of  Goal: *Prevention of pressure injury  Description: Document Dann Scale and appropriate interventions in the flowsheet. Outcome: Progressing Towards Goal  Note: Pressure Injury Interventions:             Activity Interventions: Increase time out of bed, Pressure redistribution bed/mattress(bed type), PT/OT evaluation         Nutrition Interventions: Document food/fluid/supplement intake, Offer support with meals,snacks and hydration                     Problem: Patient Education: Go to Patient Education Activity  Goal: Patient/Family Education  Outcome: Progressing Towards Goal

## 2021-12-02 NOTE — PROGRESS NOTES
[facility]  Progress Note    Patient: Brian Fitzpatrick Age: 45 y.o. : 1970 MR#: [id number] SSN: [id number]  Date: 2021     Subjective:     Reports continued SOB, loose stools; no other complaints including CP    Assessment/Plan:   1. Acute hypoxic respiratory failure - cont on Hiflo at 40 LPM  2. COVID pneumonia - cont IV decadron, DVT prophylaxis, follow inflammatory markers. s/p tocilizumab  3. Transaminitis - likely in setting of COVID infection, improving, follow  4. Elevated ddimer - cont DVT prophylaxis, follow ddimer    Plan of care was discussed with family member wife at [phone] with patient permission     Additional Notes:      Case discussed with:  [x]Patient  [x]Family  [x]Nursing  []Case Management  DVT Prophylaxis:  [x]Lovenox  []Hep SQ  []SCDs  []Coumadin   []On Heparin gtt    Objective:     VS:   Visit Vitals  /89 (BP 1 Location: Right upper arm, BP Patient Position: At rest)   Pulse 100   Temp 98 °F (36.7 °C)   Resp 29   Ht 5' 9\" (1.753 m)   Wt 81.6 kg (180 lb)   SpO2 91%   BMI 26.58 kg/m²      Tmax/24hrs: Temp (24hrs), Av.8 °F (36.6 °C), Min:97.6 °F (36.4 °C), Max:98 °F (36.7 °C)      Intake/Output Summary (Last 24 hours) at 2021 1446  Last data filed at 2021 1400  Gross per 24 hour   Intake 100 ml   Output 600 ml   Net -500 ml     General:  Alert, NAD  HEENT: Oral mucosa moist; PERRLA  Cardiovascular:  RRR, Nl S1/S2  Pulmonary:  LSC throughout.  Dyspnea with mov't in bed  GI:  +BS in all four quadrants, soft, non-tender  Extremities:  No edema; 2+ dorsalis pedis pulses bilaterally  Neuro: alert and oriented x 4    Labs / micro / imaging :    Recent Results (from the past 24 hour(s))   LACTIC ACID    Collection Time: 21 10:53 PM   Result Value Ref Range    Lactic acid 2.1 (HH) 0.4 - 2.0 MMOL/L   CULTURE, BLOOD    Collection Time: 21  4:15 AM    Specimen: Blood   Result Value Ref Range    Special Requests: NO SPECIAL REQUESTS      Culture result: NO GROWTH AFTER 1 HOUR     D DIMER    Collection Time: 12/02/21  4:45 AM   Result Value Ref Range    D DIMER 0.59 (H) <0.46 ug/ml(FEU)   TROPONIN I    Collection Time: 12/02/21  4:45 AM   Result Value Ref Range    Troponin-I, QT <0.02 0.0 - 0.045 NG/ML   GLUCOSE, POC    Collection Time: 12/02/21  8:11 AM   Result Value Ref Range    Glucose (POC) 135 (H) 70 - 605 mg/dL   METABOLIC PANEL, COMPREHENSIVE    Collection Time: 12/02/21 11:21 AM   Result Value Ref Range    Sodium 137 136 - 145 mmol/L    Potassium 3.8 3.5 - 5.5 mmol/L    Chloride 103 100 - 111 mmol/L    CO2 26 21 - 32 mmol/L    Anion gap 8 3.0 - 18 mmol/L    Glucose 138 (H) 74 - 99 mg/dL    BUN 27 (H) 7.0 - 18 MG/DL    Creatinine 0.97 0.6 - 1.3 MG/DL    BUN/Creatinine ratio 28 (H) 12 - 20      GFR est AA >60 >60 ml/min/1.73m2    GFR est non-AA >60 >60 ml/min/1.73m2    Calcium 8.5 8.5 - 10.1 MG/DL    Bilirubin, total 0.6 0.2 - 1.0 MG/DL    ALT (SGPT) 292 (H) 16 - 61 U/L    AST (SGOT) 256 (H) 10 - 38 U/L    Alk.  phosphatase 116 45 - 117 U/L    Protein, total 6.5 6.4 - 8.2 g/dL    Albumin 2.8 (L) 3.4 - 5.0 g/dL    Globulin 3.7 2.0 - 4.0 g/dL    A-G Ratio 0.8 0.8 - 1.7     PROCALCITONIN    Collection Time: 12/02/21 11:21 AM   Result Value Ref Range    Procalcitonin 0.20 ng/mL   C REACTIVE PROTEIN, QT    Collection Time: 12/02/21 11:21 AM   Result Value Ref Range    C-Reactive protein 3.3 (H) 0 - 0.3 mg/dL   CBC WITH AUTOMATED DIFF    Collection Time: 12/02/21 11:21 AM   Result Value Ref Range    WBC 4.4 (L) 4.6 - 13.2 K/uL    RBC 5.13 4.35 - 5.65 M/uL    HGB 14.7 13.0 - 16.0 g/dL    HCT 43.1 36.0 - 48.0 %    MCV 84.0 78.0 - 100.0 FL    MCH 28.7 24.0 - 34.0 PG    MCHC 34.1 31.0 - 37.0 g/dL    RDW 12.7 11.6 - 14.5 %    PLATELET 417 051 - 909 K/uL    MPV 9.6 9.2 - 11.8 FL    NRBC 0.0 0  WBC    ABSOLUTE NRBC 0.00 0.00 - 0.01 K/uL    NEUTROPHILS 80 (H) 40 - 73 %    LYMPHOCYTES 10 (L) 21 - 52 %    MONOCYTES 10 3 - 10 %    EOSINOPHILS 0 0 - 5 %    BASOPHILS 0 0 - 2 %    IMMATURE GRANULOCYTES 0 %    ABS. NEUTROPHILS 3.6 1.8 - 8.0 K/UL    ABS. LYMPHOCYTES 0.4 (L) 0.9 - 3.6 K/UL    ABS. MONOCYTES 0.4 0.05 - 1.2 K/UL    ABS. EOSINOPHILS 0.0 0.0 - 0.4 K/UL    ABS. BASOPHILS 0.0 0.0 - 0.1 K/UL    ABS. IMM. GRANS. 0.0 K/UL    DF MANUAL      PLATELET COMMENTS ADEQUATE PLATELETS      RBC COMMENTS NORMOCYTIC, NORMOCHROMIC         Results     Procedure Component Value Units Date/Time    CULTURE, BLOOD [[id number]] Collected: 12/02/21 0445    Order Status: Completed Specimen: Blood Updated: 12/02/21 0626    CULTURE, BLOOD [[id number]] Collected: 12/02/21 0415    Order Status: Completed Specimen: Blood Updated: 12/02/21 0708     Special Requests: NO SPECIAL REQUESTS        Culture result: NO GROWTH AFTER 1 HOUR             CTA CHEST W OR W WO CONT    Result Date: 12/1/2021  1. No CT evidence for central pulmonary embolism. 2. Mixed aggregate of predominantly peripheral upper and right middle lobe groundglass infiltrates and more dense and consolidative bilateral lower lobe subpleural airspace infiltrates. Findings would be compatible with Covid pneumonia distribution. -No effusions. XR CHEST PORT    Result Date: 12/1/2021  Low lung volumes. Multifocal streaky opacities likely represent patient's known covid 19 infection. US LIVER    Result Date: 12/1/2021  Limited evaluation due to bowel gas. Cholelithiasis, without evidence for cholecystitis. Increased echogenicity of the liver is nonspecific, most commonly seen with hepatic steatosis. Thank you for enabling us to participate in the care of this patient.        Signed By: Wilder Perera NP     December 2, 2021

## 2021-12-02 NOTE — ADT AUTH CERT NOTES
[address] [place] [address]     FACILITY NPI :[id number]  FACILITY TAX ID :   [facility]  [facility] [address] [address] [phone]          DEPT CONTACT:  Kailyn Marie  AE#[phone]  XAE#[phone]     Patient Name :Benito Swanson   : 1970 (51 yrs)  MRN : [id number]     Patient Mailing Address [address] [47] , [address]                  Insurance Plan Payor: BLUE CROSS / Plan: [address] [place] [address] / Product Type: PPO /      Primary Coverage Subscriber ID : [id number]     Secondary Coverage:  N/A         Current Patient Class : INPATIENT  Admit Date : 2021     REQUESTED LEVEL OF CARE: INPATIENT [101]                                                           Diagnosis : Pneumonia due to COVID-19 virus; Hypoxia                          ICD10 Code : Pneumonia due to COVID-19 virus [U07.1, J12.82]  Hypoxia [R09.02]     Current Room and Bed 366/01     Admitting and Attending Info:  Admitting Provider : Jim Abel MD   NPI: [id number]  Admitting Provider Phone. [phone]  Admitting Provider Address: [address] [place] [address]     Attending Provider Paulie Gomez MD   [de-identified]  Attending Provider [facility]:  [address] [facility]                                                   [address]     Attending Provider Phone: Attending hipolito phone: [phone]             Utilization Reviews         Pneumonia - Care Day 2 (2021) by Delvin Gilbert RN       Review Entered Review Status   2021 14:26 Completed      Criteria Review      Care Day: 2 Care Date: 2021 Level of Care: Telemetry    Guideline Day 2    Clinical Status    (X) * No CO2 retention or acidosis    2021 14:26:05 EST by Jay raymond acid-2.1  CO2-26    ( ) * No requirement for mechanical ventilation    12/2/2021 14:26:05 EST by Tom Jay hi braden 40%    (X) * Hypotension absent    12/2/2021 14:26:05 EST by Tom Jay absent    (X) * Afebrile or fever improved    12/2/2021 14:26:05 EST by Fleet Milch      98.0    ( ) * No hypoxia on room air or oxygenation improved    (X) * Mental status improved or at baseline    12/2/2021 14:26:05 EST by Fleet Milch      baseline    Activity    (X) * Increased activity    12/2/2021 14:26:05 EST by Tom Jay with assist    Routes    (X) Oral hydration    12/2/2021 14:26:05 EST by Fleet Milтатьяна      oral    (X) Usual diet    12/2/2021 14:26:05 EST by Fleet Milch      reg    Interventions    (X) Incentive spirometry    12/2/2021 14:26:05 EST by Fleet Milch      q2h    (X) Pulse oximetry    12/2/2021 14:26:05 EST by Fleet Milch      spot check    (X) Head of bed at 30 degrees    12/2/2021 14:26:05 EST by Mona Haro elevated    (X) Possible oxygen    12/2/2021 14:26:05 EST by Tom Jay hi braden 40%    Medications    (X) IV or oral antibiotics    12/2/2021 14:26:05 EST by Anthony Chamberlain antx as listed    12/2/2021 14:26:05 EST by Fleet Milch    Subject: Additional Clinical Information    Clinical 12-2-2021; Attempted to wean down hi braden O2 to 35%, SATS down to 91% and titrated back up to 40%. Resting comfortably currently. Continue IV steroids and IV antx.               VITALS:T- 98.0      B/p-131/89      HR-100      RR-29      SATS-91% 40% hi braden              LABS:      glucose-130      BUN-27   B/C 28      albumin-2.8      ALT-292      AST-256      CRP-3.3              ORDERS:      zithromax 500mg ivqd      rocephin 2 g iv qd      decadron 10mg ivq 12h      continue current tx       * Milestone        Pneumonia - Care Day 1 (12/1/2021) by Ruth Drew RN       Review Entered Review Status   12/2/2021 10:40 Completed      Criteria Review Care Day: 1 Care Date: 12/1/2021 Level of Care: Telemetry    Guideline Day 1    Level Of Care    (X) ICU or floor    12/2/2021 10:40:27 EST by Steve Mcintosh      telemetry    (X) Discharge planning    12/2/2021 10:40:27 EST by Britany Cage as per d/c planning criteria    Clinical Status    (X) * Clinical Indications met    12/2/2021 10:40:27 EST by Britany Cage hypoxic in 70's and then up to 88%    (X) Possible Fever, dyspnea, purulent sputum, pleuritic pain, Altered mental status    12/2/2021 10:40:27 EST by Steve Buys      fever, dyspnea    Activity    (X) Activity as tolerated    12/2/2021 10:40:27 EST by Britany Cage with assist    Routes    (X) Parenteral or oral medications    12/2/2021 10:40:27 EST by Britany Cage as listed    (X) Liquid or usual diet    12/2/2021 10:40:27 EST by Steve Buys      reg    Interventions    (X) WBC    12/2/2021 10:40:27 EST by Steve Buys      3.8    (X) Possible ABG or oximetry    12/2/2021 10:40:27 EST by Steve Buys      oximetry    (X) Blood culture    12/2/2021 10:40:27 EST by Britany Cage pending    (X) Probable oxygen    12/2/2021 10:40:27 EST by Britany Cage hi braden 40%    (X) Incentive spirometry    12/2/2021 10:40:27 EST by Steve Mcintosh      q2h    (X) Head of bed at 30 degrees    12/2/2021 10:40:27 EST by Soraya Perkins elevated    Medications    (X) IV antibiotics    12/2/2021 10:40:27 EST by Britany Cage as listed    12/2/2021 10:40:27 EST by Steve Mcintosh    Subject: Additional Clinical Information    Admission 12-1-2021: Patient is admitted for management of acute hypoxic respiratory failure, covid 19 related pneumonia, leukopenia, elevated d-dimer, tranaminitis, and hx kidney stones. Infectious disease is consulted.               ORDERS:      telemetry      hi braden O2 40%      zithromax 500mg iv qd      rocephin 2g iv qd      decadron 4mg iv q12h      lovenox 40 mg sqqd      pepcid 20mg po bid      tylenol 650mg po/rect q 6h prn      duonebs q 4h prn      zofran 4mg poq 8h prn      zofran 4mg ivq 6h prn      labs      cough, deep breathe      droplet plus isolation      HOB elevated      oximetry spot check      I&O                       * Milestone        Pneumonia - Clinical Indications for Admission to Inpatient Care by Sekou Olivares RN       Review Entered Review Status   12/2/2021 10:30 Completed      Criteria Review      Clinical Indications for Admission to Inpatient Care    Most Recent : Tasia Nicole Most Recent Date: 12/2/2021 10:30:02 EST    (X) Admission is indicated for  1 or more  of the following  [A] [B] (1) (2) (9) (10) (11):       (X) Hypoxemia       12/2/2021 10:30:02 EST by Tasia Nicole         SATS 88%    Notes:    12/2/2021 10:30:03 EST by Tasia Nicole    Subject: Additional Clinical Information    Admission 12-1-2021: 46 yo male admitted from ED with c/o diagnosed with + COVID Nov 26th, and was doing fine til yesterday when became SOB/KYLE, chills, cough, diarrhea and decreased appetite.  He does have low grade temp, b/p low, RR 32 and he is hypoxic. LFT's, d-dimer, ferritin, LD, CRP, and lactic acid are elevated. CXR showing covid related pneumonia. General: awake alert and oriented    Skin: no rashes or skin lesions noted on limited exam    HEENT: Normocephalic, atraumatic, PERRL, EOMI, no scleral icterus or pallor; no conjunctival hemmohage;  nasal and oral mucous are moist and without evidence of lesions. No thrush. Dentition good. Neck supple, no bruits. Lymph Nodes: no cervical, axillary or inguinal adenopathy    Lungs: non-labored, bilaterally clear to aspiration- no crackles wheezes rales or rhonchi    Heart: RRR, s1 and s2; no murmurs rubs or gallops, no edema, + pedal pulses    Abdomen:soft, non-distended, active bowel sounds, no hepatomegaly, no splenomegaly. Appropriate surgical scars for stated surgeries.  Non-tender    Genitourinary:deferred Extremities: no clubbing, cyanosis; no joint effusions or swelling; Full ROM of all large joints to the upper and lower extremities; muscle mass appropriate for age    Neurologic: No gross focal sensory abnormalities; 5/5 muscle strength to upper and lower extremities. Speech appropirate. Cranial nerves intact    Psychiatric: appropriate and interactive. VITALS:T- 99.3      B/P-103/79      HR-97      RR-32      SATS-88%, in 70's on admit to ED              LABS:      Na-134      K+-3.5      glu-103      creat-0.90      WBC-3.8      h/h-14.8/43. 6      plt-152      neutro-83      fibrinogen-565      d-dimer-1.06      AST-312      ALT-330      albumin-2.8      ferritin- 7446      CRP-7.8      LD-545      lactic acid-2.1      blood cul-pending      U/S liver- Limited evaluation due to bowel gas.     Cholelithiasis, without evidence for cholecystitis.     Increased echogenicity of the liver is nonspecific, most commonly seen with    hepatic steatosis      CTA chest- Mixed aggregate of predominantly peripheral upper and right middle lobegroundglass infiltrates and more dense and consolidative bilateral lower lobe    subpleural airspace infiltrates. Findings would be compatible with Covid    pneumonia distribution.    -No effusions. CXR-Low lung volumes.  Multifocal streaky opacities likely represent patient's known    covid 19 infection      EKG- NSR              MEDS IN ED:      Actemra 600mg ivx 1      decadron 6mg ivx 1      rocephin 2g ivx 1      zithromax 500mg ivx 1      lovenox 30 mg sq x 1      decadron 4mg ivx 1                        H&P Notes       H&P by Mary Castellano MD at 12/01/21 1132 documented on ED to Hosp-Admission (Current) from 12/1/2021 in [facility] [address]: Mary Castellano MD Author Type: Physician Filed: 12/01/21 1203   Note Status: Signed Cosign: Cosign Not Required Date of Service: 12/01/21 1132   : Mary Castellano MD (Physician)                  Date of Admission: 12/1/2021        Assessment:   Acute hypoxic respiratory failure:  Currently on HF at [address] [place] [address]              - 12/1 CXR: low lung volumes, multifocal streaky opacities              - 12/1 CTA chest: negative for PE, mixed peripheral upper and right middle lobe ground glass opacities, b/l LL infiltrates. No effusions  COVID pneumonia:   Symptoms since 11/22. Dx on 11/26. Elevated ferritin, CRP, LDH  Leukopenia:  With lymphopenia  Mildly elevated d-dimer  Transaminitis     Plan:   Continue supportive O2- currently on HF at [address] [place] [address]. Consider ancillary COVID-19 supportive therapies: Vit C, Vit D, zinc, melatonin  Droplet + precautions   Continue Ceftriaxone and azithromycin for now- may adjust based on cultures and procalcitonin  F/u blood cx from 12/1     Continue O2NC; titrate as needed to maintain sats>92  Duonebs prn  Aspiration precautions  Monitor inflammatory markers: LDH, d-dimer, troponin, CPK, ferritin  CBC, CMP  Liver US  Remdesivir not indicated due to duration of symptoms  Continue dexamethasone x 10 days total     Lovenox 40 mg IV daily for DVT prophylaxis   SSI and Accuchecks for tight glucose control while on decadron.         Nishant Gallegos D.O. Internal Medicine and Infectious Diseases        Subjective:    Patient is a 45 y.o.male who is being evaluated for shortness of breath.     Mr. Kee Villanueva is a pleasant 44-year-old gentleman with a past medical history significant for nephrolithiasis who is presenting to the emergency department after increasing shortness of breath. He states that pulmonary embolism her started to have cough and upper respiratory type symptoms on 11/22/2021. He had subsequently been tested and was diagnosed with Covid infection on November 26. Overall he has been doing fairly okay with minimal symptoms. Within the last 24 hours however he started to develop increasing dyspnea and shortness of breath. He has an intermittent dry cough.   Denies any current fevers although he had some low-grade past.  Patient is unvaccinated     ED events: Temp 99.3 pulse 101 currently on high flow with sepsis between 98 and 99% respiratory rate 31. WBC 3.8 with lymphopenia. Sodium 134, albumin 2.8 CRP 7.8 ferritin 7446, . CRP 7.8          Past Medical History:   Diagnosis Date    Kidney stone              Past Surgical History:   Procedure Laterality Date    HX OTHER SURGICAL         Stent placed in Right Kidney on 2/12/2018      History reviewed. No pertinent family history. Medications reviewed as below:             Current Facility-Administered Medications   Medication Dose Route Frequency Provider Last Rate Last Admin    sodium chloride (NS) flush 5-40 mL  5-40 mL IntraVENous Q8H Isidro Mclaughlin MD   10 mL at 12/01/21 5748    sodium chloride (NS) flush 5-40 mL  5-40 mL IntraVENous PRN Isidro Mclaughlin MD        cefTRIAXone (ROCEPHIN) 2 g in sterile water (preservative free) 20 mL IV syringe  2 g IntraVENous Q24H Isidro Mclaughlin MD        azithromycin (ZITHROMAX) 500 mg in 0.9% sodium chloride 250 mL (VIAL-MATE)  500 mg IntraVENous Q24H Iisdro Mclaughlin MD        enoxaparin (LOVENOX) injection 30 mg  30 mg SubCUTAneous Q12H Isidro Mclaughlin MD        acetaminophen (TYLENOL) tablet 650 mg  650 mg Oral Q6H PRN Isidro Mclaughlin MD         Or    acetaminophen (TYLENOL) suppository 650 mg  650 mg Rectal Q6H PRN Isidro Mclaughlin MD        dexamethasone (DECADRON) 4 mg/mL injection 4 mg  4 mg IntraVENous ONCE Isidro Mclaughlin MD                Allergies   Allergen Reactions    Codeine Other (comments)       Pt.  States \"I throw up\".      Social History            Socioeconomic History    Marital status:        Spouse name: Not on file    Number of children: Not on file    Years of education: Not on file    Highest education level: Not on file   Occupational History    Not on file   Tobacco Use    Smoking status: Never Smoker    Smokeless tobacco: Never Used Substance and Sexual Activity    Alcohol use: No    Drug use: Not Currently       Types: Marijuana, Cocaine       Comment: Stopped using when he was 23years old    Sexual activity: Yes   Other Topics Concern    Not on file   Social History Narrative    Not on file      Social Determinants of Health          Financial Resource Strain:     Difficulty of Paying Living Expenses: Not on file   Food Insecurity:     Worried About Running Out of Food in the Last Year: Not on file    Lindsay of Food in the Last Year: Not on file   Transportation Needs:     Lack of Transportation (Medical): Not on file    Lack of Transportation (Non-Medical): Not on file   Physical Activity:     Days of Exercise per Week: Not on file    Minutes of Exercise per Session: Not on file   Stress:     Feeling of Stress : Not on file   Social Connections:     Frequency of Communication with Friends and Family: Not on file    Frequency of Social Gatherings with Friends and Family: Not on file    Attends [employer] Services: Not on file    Active Member of [address] [place] [address] or Organizations: Not on file    Attends Club or Organization Meetings: Not on file    Marital Status: Not on file   Intimate Partner Violence:     Fear of Current or Ex-Partner: Not on file    Emotionally Abused: Not on file    Physically Abused: Not on file    Sexually Abused: Not on file   Housing Stability:     Unable to Pay for Housing in the Last Year: Not on file    Number of [place] in the Last Year: Not on file    Unstable Housing in the Last Year: Not on file         Review of Systems     Negative Unless BOLDED     General: fevers, chills, myalgias, arthralgias, unexplained weight loss, malaise, fatigue.   HEENT:  headaches,sinus pain or presure, recent URI, recent dental procedures;  tinnitus, hearing loss , visual changes, catarats, dizziness or blurred vision  PUlMONARY:  cough , shortness of breath, sputum production, hx of asthma or COPD. previous treatement for TB or PPD. Cardiovascular: chest pain, previous CAD/MI, vavlular heart disease,  murmurs  GI:   nausea, vomiting, diarrhea, abdominal pain, prior C.diff  :  urinary frequency, dysuria, hematuria, bladder incontinence. Neurologic:  seizures, syncope or prior CVA/TIA, confusion, memory impairment, neuropathy  Musculoskeletal:  myalgias arthralgias, joint pain/ swelling,  back pain  Skin:  Purities,  recurrent cellulitis,  chronic stasis ulcer, diabetic foot ulcers  Endocrine: polyuria, polydipsia, hair loss, weight gain  Psych: Denies depression or treatment by a psychiatrist/psycologist  Heme-Onc: prior DVT, easy bruising, fatigue, malignancy           Objective:         Visit Vitals  /76   Pulse 97   Temp 99.3 °F (37.4 °C)   Resp (!) 31   Ht 5' 9\" (1.753 m)   Wt 81.6 kg (180 lb)   SpO2 99%   BMI 26.58 kg/m²      Temp (24hrs), Av.3 °F (37.4 °C), Min:99.3 °F (37.4 °C), Max:99.3 °F (37.4 °C)           General:   awake alert and oriented   Skin:   no rashes or skin lesions noted on limited exam   HEENT:  Normocephalic, atraumatic, PERRL, EOMI, no scleral icterus or pallor; no conjunctival hemmohage;  nasal and oral mucous are moist and without evidence of lesions. No thrush. Dentition good. Neck supple, no bruits. Lymph Nodes:   no cervical, axillary or inguinal adenopathy   Lungs:   non-labored, bilaterally clear to aspiration- no crackles wheezes rales or rhonchi   Heart:  RRR, s1 and s2; no murmurs rubs or gallops, no edema, + pedal pulses   Abdomen:  soft, non-distended, active bowel sounds, no hepatomegaly, no splenomegaly. Appropriate surgical scars for stated surgeries. Non-tender   Genitourinary:  deferred   Extremities:   no clubbing, cyanosis; no joint effusions or swelling; Full ROM of all large joints to the upper and lower extremities; muscle mass appropriate for age   Neurologic:  No gross focal sensory abnormalities; 5/5 muscle strength to upper and lower extremities. Speech appropirate.  Cranial nerves intact   Psychiatric:   appropriate and interactive.         Labs: Results:   Chemistry     Recent Labs     12/01/21  0640   *   *   K 3.5      CO2 25   BUN 16   CREA 0.90   CA 8.7   AGAP 8   BUCR 18       CBC w/Diff     Recent Labs     12/01/21  0640   WBC 3.8*   RBC 5.21   HGB 14.8   HCT 43.6      GRANS 83*   LYMPH 15*   EOS 0                No results found for: SDES       Lab Results   Component Value Date/Time     Culture result: NO GROWTH 2 DAYS 02/10/2018 05:30 PM             Imaging:      All imaging reviewed from Admission to present as per radiology interpretation in [address] [place] [address]

## 2021-12-02 NOTE — ROUTINE PROCESS
0730: Verbal shift change report given to Vincenzo Spatz (oncoming nurse) by Ra Muhammad RN (offgoing nurse). Report included the following information SBAR, Kardex, Intake/Output, MAR and Recent Results. 0815: Pt arrived on unit in wheelchair with NRB mask. Pt placed on HiFlow NC 40L and 70% FiO2 by RT.  VSS. Pt has no pain at this time and states his tolerable pain level is a 7/10. Pt orientated to room and unit. No issues at this time and all questions answered. Dual Skin assessment completed with Nay LOPEZ. Skin CDI w/ redness around neck. 1300: Pt education done on Sling. Pt stated he used ICS before and verbalized understanding of hourly use of device. 1400: Pt ambulated to bedside commode and had small, watery BM. O2 sat dropped to 89% and pt quickly recovered to 93% once returned to bed. 1930: Bedside and Verbal shift change report given to Tala Oliveros (oncoming nurse) by Vincenzo Spatz (offgoing nurse). Report included the following information SBAR, Kardex, Intake/Output, MAR and Recent Results.

## 2021-12-02 NOTE — PROGRESS NOTES
Physical Exam  Skin:             Skin CDI with no pressure injury noted. Pt has redness around entire neck.  Dual Skin Assessment completed with Elyssa Shetty RN

## 2021-12-02 NOTE — PROGRESS NOTES
Infectious Disease progress Note        Reason: Acute hypoxic respiratory failure, confirmed COVID-19 pneumonia    Current abx Prior abx   Ceftriaxone, azithromycin since 12/1/2021      Lines:       Assessment :    23-year-old man with no significant past medical history admitted to [facility] on 12/1/2021 with increasing shortness of breath for 2 to 3 days. Symptoms of COVID-19 since 11/22/2021  Positive Covid test 11/26/2021    Sudden worsening hypoxia with oxygen saturation in 70s on presentation  Rapidly worsening oxygenation now on high flow oxygen at 40 L, tachypnea, elevated ferritin- 7000s, crp: 7.8    Clinical presentation consistent with acute hypoxic respiratory failure-present on admission due to severe COVID-19 pneumonia    S/p high dose decadron, tocilizumab since 12/1/21    Mild elevated procalcitonin-could be secondary to severe COVID-19 pneumonia. Will monitor for superimposed bacterial infection    Mild elevated V-xovtc-flaoan due to severe COVID-19 pneumonia. No evidence of pulmonary bolus him noted on CTA chest 12/1/2021    Unvaccinated patient-high risk for clinical decompensation. Patient doesn't think he wants to be intubated. But still not ready to sign DNR/DNI    Recommendations:    1. Continue Decadron  10 mg IV every 12 hours. 2.  Continue ceftriaxone, azithromycin for now  3. Monitor CRP, D-dimer, procalcitonin  4. Titrate oxygen as tolerated  5. Prophylactic anticoagulation per primary team  6. Recommend continued discussion about goals of care/code status. Above plan was discussed in details with patient,RN. Please call me if any further questions or concerns. Will continue to participate in the care of this patient. HPI:    Feels the same. Patient denies any pleuritic chest pain. There are no discharge medications for this patient.       Current Facility-Administered Medications   Medication Dose Route Frequency    sodium chloride (NS) flush 5-40 mL  5-40 mL IntraVENous Q8H    sodium chloride (NS) flush 5-40 mL  5-40 mL IntraVENous PRN    cefTRIAXone (ROCEPHIN) 2 g in sterile water (preservative free) 20 mL IV syringe  2 g IntraVENous Q24H    azithromycin (ZITHROMAX) 500 mg in 0.9% sodium chloride 250 mL (VIAL-MATE)  500 mg IntraVENous Q24H    enoxaparin (LOVENOX) injection 30 mg  30 mg SubCUTAneous Q12H    acetaminophen (TYLENOL) tablet 650 mg  650 mg Oral Q6H PRN    Or    acetaminophen (TYLENOL) suppository 650 mg  650 mg Rectal Q6H PRN    albuterol-ipratropium (DUO-NEB) 2.5 MG-0.5 MG/3 ML  3 mL Nebulization Q4H PRN    dexamethasone (DECADRON) 4 mg/mL injection 10 mg  10 mg IntraVENous Q12H       Allergies: Codeine    History reviewed. No pertinent family history. Social History     Socioeconomic History    Marital status:      Spouse name: Not on file    Number of children: Not on file    Years of education: Not on file    Highest education level: Not on file   Occupational History    Not on file   Tobacco Use    Smoking status: Never Smoker    Smokeless tobacco: Never Used   Substance and Sexual Activity    Alcohol use: No    Drug use: Not Currently     Types: Marijuana, Cocaine     Comment: Stopped using when he was 23years old    Sexual activity: Yes   Other Topics Concern    Not on file   Social History Narrative    Not on file     Social Determinants of Health     Financial Resource Strain:     Difficulty of Paying Living Expenses: Not on file   Food Insecurity:     Worried About Running Out of Food in the Last Year: Not on file    Lindsay of Food in the Last Year: Not on file   Transportation Needs:     Lack of Transportation (Medical): Not on file    Lack of Transportation (Non-Medical):  Not on file   Physical Activity:     Days of Exercise per Week: Not on file    Minutes of Exercise per Session: Not on file   Stress:     Feeling of Stress : Not on file   Social Connections:     Frequency of Communication with Friends and Family: Not on file    Frequency of Social Gatherings with Friends and Family: Not on file    Attends Denominational Services: Not on file    Active Member of Clubs or Organizations: Not on file    Attends Club or Organization Meetings: Not on file    Marital Status: Not on file   Intimate Partner Violence:     Fear of Current or Ex-Partner: Not on file    Emotionally Abused: Not on file    Physically Abused: Not on file    Sexually Abused: Not on file   Housing Stability:     Unable to Pay for Housing in the Last Year: Not on file    Number of [place] in the Last Year: Not on file    Unstable Housing in the Last Year: Not on file     Social History     Tobacco Use   Smoking Status Never Smoker   Smokeless Tobacco Never Used        No data recorded. Visit Vitals  /75   Pulse 84   Temp 99.3 °F (37.4 °C)   Resp 24   Ht 5' 9\" (1.753 m)   Wt 81.6 kg (180 lb)   SpO2 93%   BMI 26.58 kg/m²       ROS: 12 point ROS obtained in details. Pertinent positives as mentioned in HPI,   otherwise negative    Physical Exam:    Vitals signs and nursing note reviewed. Sitting on bed. Appears tachypneic      HENT:      Head: Normocephalic. Eyes:      Conjunctiva/sclera: Conjunctivae normal.      Neck:      Musculoskeletal: Normal range of motion and neck supple. Cardiovascular:      Rate and Rhythm: Normal rate and regular rhythm on monitor  Chest:      Bilateral chest movements equal.  Auscultation deferred due to Covid positive  Abdominal:      General: There is no distension. Palpations: Abdomen is soft. Tenderness: There is no abdominal tenderness. There is no rebound. Musculoskeletal: Normal range of motion. General: No tenderness. Skin:     General: Skin is warm and dry. Findings: No rash. Neurological:      Mental Status: He is alert and oriented to person, place, and time. Cranial Nerves: No cranial nerve deficit. Motor: No abnormal muscle tone.       Coordination: Coordination normal.   Psychiatric:         Behavior: Behavior normal.         Thought Content: Thought content normal.         Judgment: Judgment normal.       Labs: Results:   Chemistry Recent Labs     12/01/21  0640   *   *   K 3.5      CO2 25   BUN 16   CREA 0.90   CA 8.7   AGAP 8   BUCR 18      TP 6.3*   ALB 2.8*   GLOB 3.5   AGRAT 0.8      CBC w/Diff Recent Labs     12/01/21  0640   WBC 3.8*   RBC 5.21   HGB 14.8   HCT 43.6      GRANS 83*   LYMPH 15*   EOS 0      Microbiology Recent Labs     12/02/21  0415   CULT NO GROWTH AFTER 1 HOUR          RADIOLOGY:    All available imaging studies/reports in [facility] for this admission were reviewed    High complexity decision making was performed during the evaluation of this patient at high risk for decompensation with multiple organ involvement         Disclaimer: Sections of this note are dictated utilizing voice recognition software, which may have resulted in some phonetic based errors in grammar and contents. Even though attempts were made to correct all the mistakes, some may have been missed, and remained in the body of the document. If questions arise, please contact our department.     Dr. Jami Manley, Infectious Disease Specialist  [phone]  December 2, 2021  11:26 AM

## 2021-12-03 LAB
ALBUMIN SERPL-MCNC: 2.5 G/DL (ref 3.4–5)
ALBUMIN/GLOB SERPL: 0.6 {RATIO} (ref 0.8–1.7)
ALP SERPL-CCNC: 104 U/L (ref 45–117)
ALT SERPL-CCNC: 257 U/L (ref 16–61)
ANION GAP SERPL CALC-SCNC: 5 MMOL/L (ref 3–18)
AST SERPL-CCNC: 188 U/L (ref 10–38)
BASOPHILS # BLD: 0 K/UL (ref 0–0.1)
BASOPHILS NFR BLD: 0 % (ref 0–2)
BILIRUB SERPL-MCNC: 0.4 MG/DL (ref 0.2–1)
BUN SERPL-MCNC: 29 MG/DL (ref 7–18)
BUN/CREAT SERPL: 28 (ref 12–20)
CALCIUM SERPL-MCNC: 8.2 MG/DL (ref 8.5–10.1)
CHLORIDE SERPL-SCNC: 104 MMOL/L (ref 100–111)
CO2 SERPL-SCNC: 28 MMOL/L (ref 21–32)
CREAT SERPL-MCNC: 1.02 MG/DL (ref 0.6–1.3)
CRP SERPL-MCNC: 1.5 MG/DL (ref 0–0.3)
D DIMER PPP FEU-MCNC: 0.53 UG/ML(FEU)
DIFFERENTIAL METHOD BLD: ABNORMAL
EOSINOPHIL # BLD: 0 K/UL (ref 0–0.4)
EOSINOPHIL NFR BLD: 0 % (ref 0–5)
ERYTHROCYTE [DISTWIDTH] IN BLOOD BY AUTOMATED COUNT: 12.6 % (ref 11.6–14.5)
GLOBULIN SER CALC-MCNC: 4 G/DL (ref 2–4)
GLUCOSE SERPL-MCNC: 142 MG/DL (ref 74–99)
HCT VFR BLD AUTO: 40.8 % (ref 36–48)
HGB BLD-MCNC: 13.7 G/DL (ref 13–16)
IMM GRANULOCYTES # BLD AUTO: 0 K/UL
IMM GRANULOCYTES NFR BLD AUTO: 0 %
LYMPHOCYTES # BLD: 0.5 K/UL (ref 0.9–3.6)
LYMPHOCYTES NFR BLD: 8 % (ref 21–52)
MCH RBC QN AUTO: 28.4 PG (ref 24–34)
MCHC RBC AUTO-ENTMCNC: 33.6 G/DL (ref 31–37)
MCV RBC AUTO: 84.6 FL (ref 78–100)
MONOCYTES # BLD: 0.5 K/UL (ref 0.05–1.2)
MONOCYTES NFR BLD: 8 % (ref 3–10)
NEUTS SEG # BLD: 5.7 K/UL (ref 1.8–8)
NEUTS SEG NFR BLD: 84 % (ref 40–73)
NRBC # BLD: 0 K/UL (ref 0–0.01)
NRBC BLD-RTO: 0 PER 100 WBC
PLATELET # BLD AUTO: 258 K/UL (ref 135–420)
PLATELET COMMENTS,PCOM: ABNORMAL
PMV BLD AUTO: 9.8 FL (ref 9.2–11.8)
POTASSIUM SERPL-SCNC: 3.8 MMOL/L (ref 3.5–5.5)
PROCALCITONIN SERPL-MCNC: 0.11 NG/ML
PROT SERPL-MCNC: 6.5 G/DL (ref 6.4–8.2)
RBC # BLD AUTO: 4.82 M/UL (ref 4.35–5.65)
RBC MORPH BLD: ABNORMAL
SODIUM SERPL-SCNC: 137 MMOL/L (ref 136–145)
WBC # BLD AUTO: 6.7 K/UL (ref 4.6–13.2)

## 2021-12-03 PROCEDURE — 74011250637 HC RX REV CODE- 250/637: Performed by: INTERNAL MEDICINE

## 2021-12-03 PROCEDURE — 99232 SBSQ HOSP IP/OBS MODERATE 35: CPT | Performed by: INTERNAL MEDICINE

## 2021-12-03 PROCEDURE — 74011250636 HC RX REV CODE- 250/636: Performed by: INTERNAL MEDICINE

## 2021-12-03 PROCEDURE — 94762 N-INVAS EAR/PLS OXIMTRY CONT: CPT

## 2021-12-03 PROCEDURE — 77010033711 HC HIGH FLOW OXYGEN

## 2021-12-03 PROCEDURE — 36415 COLL VENOUS BLD VENIPUNCTURE: CPT

## 2021-12-03 PROCEDURE — 65660000000 HC RM CCU STEPDOWN

## 2021-12-03 PROCEDURE — APPSS45 APP SPLIT SHARED TIME 31-45 MINUTES: Performed by: NURSE PRACTITIONER

## 2021-12-03 PROCEDURE — 86140 C-REACTIVE PROTEIN: CPT

## 2021-12-03 PROCEDURE — 84145 PROCALCITONIN (PCT): CPT

## 2021-12-03 PROCEDURE — 74011000250 HC RX REV CODE- 250: Performed by: INTERNAL MEDICINE

## 2021-12-03 PROCEDURE — 85379 FIBRIN DEGRADATION QUANT: CPT

## 2021-12-03 PROCEDURE — 80053 COMPREHEN METABOLIC PANEL: CPT

## 2021-12-03 PROCEDURE — 85025 COMPLETE CBC W/AUTO DIFF WBC: CPT

## 2021-12-03 RX ADMIN — Medication 10 ML: at 17:37

## 2021-12-03 RX ADMIN — Medication 10 ML: at 21:32

## 2021-12-03 RX ADMIN — DEXAMETHASONE SODIUM PHOSPHATE 10 MG: 4 INJECTION, SOLUTION INTRA-ARTICULAR; INTRALESIONAL; INTRAMUSCULAR; INTRAVENOUS; SOFT TISSUE at 08:45

## 2021-12-03 RX ADMIN — FAMOTIDINE 20 MG: 20 TABLET ORAL at 08:45

## 2021-12-03 RX ADMIN — Medication 10 ML: at 05:54

## 2021-12-03 RX ADMIN — FAMOTIDINE 20 MG: 20 TABLET ORAL at 17:36

## 2021-12-03 RX ADMIN — AZITHROMYCIN DIHYDRATE 500 MG: 500 INJECTION, POWDER, LYOPHILIZED, FOR SOLUTION INTRAVENOUS at 13:24

## 2021-12-03 RX ADMIN — ENOXAPARIN SODIUM 40 MG: 100 INJECTION SUBCUTANEOUS at 08:45

## 2021-12-03 RX ADMIN — WATER 2 G: 1 INJECTION INTRAMUSCULAR; INTRAVENOUS; SUBCUTANEOUS at 12:48

## 2021-12-03 RX ADMIN — DEXAMETHASONE SODIUM PHOSPHATE 10 MG: 4 INJECTION, SOLUTION INTRA-ARTICULAR; INTRALESIONAL; INTRAMUSCULAR; INTRAVENOUS; SOFT TISSUE at 21:30

## 2021-12-03 NOTE — ROUTINE PROCESS
1900-Bedside shift change report given to María (oncoming nurse) by Wesly Mann (offgoing nurse). Report included the following information SBAR, Kardex and MAR. Bedside and Verbal shift change report given to Wesly Mann (oncoming nurse) by Thomas Suarez RN (offgoing nurse). Report included the following information SBAR, Kardex and MAR.

## 2021-12-03 NOTE — PROGRESS NOTES
Physician Progress Note      Nicolas Gallego  [facility] #:                  [id number]  :                       1970  ADMIT DATE:       2021 5:20 AM  DISCH DATE:  RESPONDING  PROVIDER #:        Radha Johnston MD          QUERY TEXT:    Pt admitted with COVID-19 and noted to have sepsis documentation noted in H&P but not in subsequent progress notes. If possible, please document in progress notes and discharge summary if you are evaluating and/or treating: The medical record reflects the following:  Risk Factors: Patient presented with Covid infection found to have Covid pneumonia    Clinical Indicators: ED WBC 3.8 Neutrophils 83, T 99.3 , RR 22. Pro fernando .36, lactic acid 2.1    Treatment: Ceftriaxone, Azithromycin, blood culture, Dexamethasone    Thank you,  Qamar Escobedo RN, CCDS  Options provided:  -- Sepsis present on admission due to COVID-19 infection  -- Sepsis not present on admission due to COVID-19 infection  -- Sepsis present on admission due to COVID-19 pneumonia  -- Sepsis not present on admission due to COVID-19 pneumonia  -- Covid-19 infection without sepsis  -- Covid-19 pneumonia without sepsis  -- Other - I will add my own diagnosis  -- Disagree - Not applicable / Not valid  -- Disagree - Clinically unable to determine / Unknown  -- Refer to Clinical Documentation Reviewer    PROVIDER RESPONSE TEXT:    This patient has sepsis which was present on admission due to COVID-19 infection.     Query created by: Qamar Martinez on 12/3/2021 9:37 AM      Electronically signed by:  Radha Johnston MD 12/3/2021 9:40 AM

## 2021-12-03 NOTE — PROGRESS NOTES
[facility]  Progress Note    Patient: Patricio Patel Age: 45 y.o. : 1970 MR#: [id number] SSN: [id number]  Date: 12/3/2021     Subjective:     Patient reports some improvement in his breathing today, continues to have mild dry cough but describes as \"controllable\", reports having 1 loose bowel movement today. Appetite a little better. Patient is unvaccinated    Assessment/Plan:   1. Acute hypoxic respiratory failure - cont on [place] at 40 LPM, wean as able  2. COVID pneumonia - cont IV decadron, DVT prophylaxis, follow inflammatory markers. s/p tocilizumab   3. Transaminitis - likely in setting of COVID infection, continues to improve, denies alcohol use  4. Elevated ddimer - cont DVT prophylaxis, follow ddimer    Patient defers update to wife -he states he just spoke with her and she is now in [place] [facility] complaining and admission bed. Additional Notes:      Case discussed with:  [x]Patient  []Family  [x]Nursing  []Case Management  DVT Prophylaxis:  [x]Lovenox  []Hep SQ  []SCDs  []Coumadin   []On Heparin gtt    Objective:     VS:   Visit Vitals  /81 (BP 1 Location: Right upper arm, BP Patient Position: At rest)   Pulse 85   Temp 97.7 °F (36.5 °C)   Resp 28   Ht 5' 9\" (1.753 m)   Wt 77.1 kg (170 lb)   SpO2 93%   BMI 25.10 kg/m²      Tmax/24hrs: Temp (24hrs), Av °F (36.7 °C), Min:97.7 °F (36.5 °C), Max:98.5 °F (36.9 °C)      Intake/Output Summary (Last 24 hours) at 12/3/2021 1438  Last data filed at 12/3/2021 1200  Gross per 24 hour   Intake 180 ml   Output 1075 ml   Net -895 ml     General:  Alert, NAD  HEENT: Oral mucosa moist; PERRLA  Cardiovascular:  RRR, Nl S1/S2  Pulmonary:  LSC throughout.   Normal respiratory effort  GI:  +BS in all four quadrants, soft, non-tender  Extremities:  No edema; 2+ dorsalis pedis pulses bilaterally  Neuro: alert and oriented x 4    Labs / micro / imaging :    Recent Results (from the past 24 hour(s))   D May 19, 2017      Yisel Hartmann MD  [address] [facility] [address]           [facility]  [address]  [place] [address]  Phone: [phone]          Patient: Christopher Bloom Jr.   MR Number: [id number]   YOB: 2014   Date of Visit: 5/19/2017       Dear Dr. Yisel Hartmann:    Thank you for referring Christopher Bloom to me for evaluation. Attached you will find relevant portions of my assessment and plan of care.    If you have questions, please do not hesitate to call me. I look forward to following Christopher Bloom along with you.    Sincerely,    Abdifatah Beckham Jr., MD    Enclosure  CC:  No Recipients    If you would like to receive this communication electronically, please contact [email or web address] or [phone] to request more information on Accountable Link access.    For providers and/or their staff who would like to refer a patient to [facility], please contact us through our one-stop-shop provider referral line, [facility], at [phone].    If you feel you have received this communication in error or would no longer like to receive these types of communications, please e-mail [email or web address]          DIMER    Collection Time: 12/03/21  3:58 AM   Result Value Ref Range    D DIMER 0.53 (H) <0.46 ug/ml(FEU)   PROCALCITONIN    Collection Time: 12/03/21  3:58 AM   Result Value Ref Range    Procalcitonin 0.11 ng/mL   C REACTIVE PROTEIN, QT    Collection Time: 12/03/21  3:58 AM   Result Value Ref Range    C-Reactive protein 1.5 (H) 0 - 0.3 mg/dL   METABOLIC PANEL, COMPREHENSIVE    Collection Time: 12/03/21  3:58 AM   Result Value Ref Range    Sodium 137 136 - 145 mmol/L    Potassium 3.8 3.5 - 5.5 mmol/L    Chloride 104 100 - 111 mmol/L    CO2 28 21 - 32 mmol/L    Anion gap 5 3.0 - 18 mmol/L    Glucose 142 (H) 74 - 99 mg/dL    BUN 29 (H) 7.0 - 18 MG/DL    Creatinine 1.02 0.6 - 1.3 MG/DL    BUN/Creatinine ratio 28 (H) 12 - 20      GFR est AA >60 >60 ml/min/1.73m2    GFR est non-AA >60 >60 ml/min/1.73m2    Calcium 8.2 (L) 8.5 - 10.1 MG/DL    Bilirubin, total 0.4 0.2 - 1.0 MG/DL    ALT (SGPT) 257 (H) 16 - 61 U/L    AST (SGOT) 188 (H) 10 - 38 U/L    Alk. phosphatase 104 45 - 117 U/L    Protein, total 6.5 6.4 - 8.2 g/dL    Albumin 2.5 (L) 3.4 - 5.0 g/dL    Globulin 4.0 2.0 - 4.0 g/dL    A-G Ratio 0.6 (L) 0.8 - 1.7     CBC WITH AUTOMATED DIFF    Collection Time: 12/03/21  3:58 AM   Result Value Ref Range    WBC 6.7 4.6 - 13.2 K/uL    RBC 4.82 4.35 - 5.65 M/uL    HGB 13.7 13.0 - 16.0 g/dL    HCT 40.8 36.0 - 48.0 %    MCV 84.6 78.0 - 100.0 FL    MCH 28.4 24.0 - 34.0 PG    MCHC 33.6 31.0 - 37.0 g/dL    RDW 12.6 11.6 - 14.5 %    PLATELET 647 465 - 986 K/uL    MPV 9.8 9.2 - 11.8 FL    NRBC 0.0 0  WBC    ABSOLUTE NRBC 0.00 0.00 - 0.01 K/uL    NEUTROPHILS 84 (H) 40 - 73 %    LYMPHOCYTES 8 (L) 21 - 52 %    MONOCYTES 8 3 - 10 %    EOSINOPHILS 0 0 - 5 %    BASOPHILS 0 0 - 2 %    IMMATURE GRANULOCYTES 0 %    ABS. NEUTROPHILS 5.7 1.8 - 8.0 K/UL    ABS. LYMPHOCYTES 0.5 (L) 0.9 - 3.6 K/UL    ABS. MONOCYTES 0.5 0.05 - 1.2 K/UL    ABS. EOSINOPHILS 0.0 0.0 - 0.4 K/UL    ABS. BASOPHILS 0.0 0.0 - 0.1 K/UL    ABS. IMM.  GRANS. 0.0 K/UL DF AUTOMATED      PLATELET COMMENTS ADEQUATE PLATELETS      RBC COMMENTS NORMOCYTIC, NORMOCHROMIC         Results     Procedure Component Value Units Date/Time    CULTURE, BLOOD [[id number]] Collected: 12/02/21 0445    Order Status: Completed Specimen: Blood Updated: 12/03/21 0652     Special Requests: NO SPECIAL REQUESTS        Culture result: NO GROWTH 1 DAY       CULTURE, BLOOD [[id number]] Collected: 12/02/21 0415    Order Status: Completed Specimen: Blood Updated: 12/03/21 0652     Special Requests: NO SPECIAL REQUESTS        Culture result: NO GROWTH 1 DAY             CTA CHEST W OR W WO CONT    Result Date: 12/1/2021  1. No CT evidence for central pulmonary embolism. 2. Mixed aggregate of predominantly peripheral upper and right middle lobe groundglass infiltrates and more dense and consolidative bilateral lower lobe subpleural airspace infiltrates. Findings would be compatible with Covid pneumonia distribution. -No effusions. XR CHEST PORT    Result Date: 12/1/2021  Low lung volumes. Multifocal streaky opacities likely represent patient's known covid 19 infection. US LIVER    Result Date: 12/1/2021  Limited evaluation due to bowel gas. Cholelithiasis, without evidence for cholecystitis. Increased echogenicity of the liver is nonspecific, most commonly seen with hepatic steatosis. Thank you for enabling us to participate in the care of this patient.        Signed By: Macrina Nuñez NP     December 3, 2021

## 2021-12-03 NOTE — PROGRESS NOTES
conducted an initial consultation and Spiritual Assessment for Benito Swanson, who is a 45 y. o.,male. Patient's Primary Language is: [place]. According to the patient's EMR Congregation Affiliation is: [facility].     The reason the Patient came to the hospital is:   Patient Active Problem List    Diagnosis Date Noted    Hypoxia 12/01/2021    Pneumonia due to COVID-19 virus 12/01/2021    Dehydration 02/10/2018    Acute kidney injury (nontraumatic) ([facility] [address] 02/10/2018    Right ureteral stone 02/10/2018    Right nephrolithiasis 02/10/2018        The  provided the following Interventions:  Initiated a relationship of care and support. Explored for spiritual needs while hospitalized. Engaged supportively as he shared his medical progress. Offered assurance of  prayers on patient's behalf for continuing recovery. Chart reviewed. The following outcomes where achieved:  Patient shared limited information about both their medical narrative and spiritual journey/beliefs. Patient expressed gratitude for 's visit. Assessment:  Patient does not have any Yazidi/cultural needs that will affect patient's preferences in health care. There are no spiritual or Yazidi issues which require intervention at this time. Plan:  Chaplains will continue to follow and will provide pastoral care on an as needed/requested basis.  recommends bedside caregivers page  on duty if patient shows signs of acute spiritual or emotional distress.     [address] [place]   [phone]

## 2021-12-03 NOTE — PROGRESS NOTES
Infectious Disease progress Note        Reason: Acute hypoxic respiratory failure, confirmed COVID-19 pneumonia    Current abx Prior abx   Ceftriaxone, azithromycin since 12/1/2021      Lines:       Assessment :    59-year-old man with no significant past medical history admitted to [facility] on 12/1/2021 with increasing shortness of breath for 2 to 3 days. Symptoms of COVID-19 since 11/22/2021  Positive Covid test 11/26/2021    Sudden worsening hypoxia with oxygen saturation in 70s on presentation  Rapidly worsening oxygenation now on high flow oxygen at 40 L, tachypnea, elevated ferritin- 7000s, crp: 7.8    Clinical presentation consistent with acute hypoxic respiratory failure-present on admission due to severe COVID-19 pneumonia    S/p high dose decadron, tocilizumab since 12/1/21    Mild elevated procalcitonin-could be secondary to severe COVID-19 pneumonia. Will monitor for superimposed bacterial infection    Mild elevated [id number] due to severe COVID-19 pneumonia. No evidence of pulmonary bolus him noted on CTA chest 12/1/2021    Unvaccinated patient-high risk for clinical decompensation. Patient doesn't think he wants to be intubated. But still not ready to sign DNR/DNI    Clinically better. Gradually improving oxygenation    Recommendations:    1. Continue Decadron  10 mg IV every 12 hours till 12/5/21 followed by 10mg IV q 24 hour till 12/10/21  2. Continue ceftriaxone, azithromycin for now  3. Monitor CRP, D-dimer, procalcitonin  4. Titrate oxygen as tolerated  5. Prophylactic anticoagulation per primary team       Above plan was discussed in details with patient,RN. Please call me if any further questions or concerns. Will continue to participate in the care of this patient. HPI:    Feels better. Patient denies any pleuritic chest pain. There are no discharge medications for this patient.       Current Facility-Administered Medications   Medication Dose Route Frequency    sodium chloride (NS) flush 5-40 mL  5-40 mL IntraVENous Q8H    sodium chloride (NS) flush 5-40 mL  5-40 mL IntraVENous PRN    polyethylene glycol (MIRALAX) packet 17 g  17 g Oral DAILY PRN    ondansetron (ZOFRAN ODT) tablet 4 mg  4 mg Oral Q8H PRN    Or    ondansetron (ZOFRAN) injection 4 mg  4 mg IntraVENous Q6H PRN    enoxaparin (LOVENOX) injection 40 mg  40 mg SubCUTAneous DAILY    famotidine (PEPCID) tablet 20 mg  20 mg Oral BID    sodium chloride (NS) flush 5-40 mL  5-40 mL IntraVENous Q8H    cefTRIAXone (ROCEPHIN) 2 g in sterile water (preservative free) 20 mL IV syringe  2 g IntraVENous Q24H    azithromycin (ZITHROMAX) 500 mg in 0.9% sodium chloride 250 mL (VIAL-MATE)  500 mg IntraVENous Q24H    acetaminophen (TYLENOL) tablet 650 mg  650 mg Oral Q6H PRN    Or    acetaminophen (TYLENOL) suppository 650 mg  650 mg Rectal Q6H PRN    albuterol-ipratropium (DUO-NEB) 2.5 MG-0.5 MG/3 ML  3 mL Nebulization Q4H PRN    dexamethasone (DECADRON) 4 mg/mL injection 10 mg  10 mg IntraVENous Q12H       Allergies: Codeine    History reviewed. No pertinent family history.   Social History     Socioeconomic History    Marital status:      Spouse name: Not on file    Number of children: Not on file    Years of education: Not on file    Highest education level: Not on file   Occupational History    Not on file   Tobacco Use    Smoking status: Never Smoker    Smokeless tobacco: Never Used   Substance and Sexual Activity    Alcohol use: No    Drug use: Not Currently     Types: Marijuana, Cocaine     Comment: Stopped using when he was 23years old    Sexual activity: Yes   Other Topics Concern    Not on file   Social History Narrative    Not on file     Social Determinants of Health     Financial Resource Strain:     Difficulty of Paying Living Expenses: Not on file   Food Insecurity:     Worried About Running Out of Food in the Last Year: Not on file    Lindsay of Food in the Last Year: Not on file Transportation Needs:     Lack of Transportation (Medical): Not on file    Lack of Transportation (Non-Medical): Not on file   Physical Activity:     Days of Exercise per Week: Not on file    Minutes of Exercise per Session: Not on file   Stress:     Feeling of Stress : Not on file   Social Connections:     Frequency of Communication with Friends and Family: Not on file    Frequency of Social Gatherings with Friends and Family: Not on file    Attends Religion Services: Not on file    Active Member of [address] [place] [address] [employer] or Organizations: Not on file    Attends Club or Organization Meetings: Not on file    Marital Status: Not on file   Intimate Partner Violence:     Fear of Current or Ex-Partner: Not on file    Emotionally Abused: Not on file    Physically Abused: Not on file    Sexually Abused: Not on file   Housing Stability:     Unable to Pay for Housing in the Last Year: Not on file    Number of [place] in the Last Year: Not on file    Unstable Housing in the Last Year: Not on file     Social History     Tobacco Use   Smoking Status Never Smoker   Smokeless Tobacco Never Used        Temp (24hrs), Av.1 °F (36.7 °C), Min:97.6 °F (36.4 °C), Max:98.5 °F (36.9 °C)    Visit Vitals  /81 (BP 1 Location: Right upper arm, BP Patient Position: At rest)   Pulse 65   Temp 98.2 °F (36.8 °C)   Resp 26   Ht 5' 9\" (1.753 m)   Wt 77.1 kg (170 lb)   SpO2 97%   BMI 25.10 kg/m²       ROS: 12 point ROS obtained in details. Pertinent positives as mentioned in HPI,   otherwise negative    Physical Exam:    Vitals signs and nursing note reviewed. Sitting on bed. Appears tachypneic      HENT:      Head: Normocephalic. Eyes:      Conjunctiva/sclera: Conjunctivae normal.      Neck:      Musculoskeletal: Normal range of motion and neck supple.    Cardiovascular:      Rate and Rhythm: Normal rate and regular rhythm on monitor  Chest:      Bilateral chest movements equal.  Auscultation deferred due to Covid positive  Abdominal:      General: There is no distension. Palpations: Abdomen is soft. Tenderness: There is no abdominal tenderness. There is no rebound. Musculoskeletal: Normal range of motion. General: No tenderness. Skin:     General: Skin is warm and dry. Findings: No rash. Neurological:      Mental Status: He is alert and oriented to person, place, and time. Cranial Nerves: No cranial nerve deficit. Motor: No abnormal muscle tone. Coordination: Coordination normal.   Psychiatric:         Behavior: Behavior normal.         Thought Content: Thought content normal.         Judgment: Judgment normal.       Labs: Results:   Chemistry Recent Labs     12/03/21  0358 12/02/21  1121 12/01/21  0640   * 138* 103*    137 134*   K 3.8 3.8 3.5    103 101   CO2 28 26 25   BUN 29* 27* 16   CREA 1.02 0.97 0.90   CA 8.2* 8.5 8.7   AGAP 5 8 8   BUCR 28* 28* 18    116 109   TP 6.5 6.5 6.3*   ALB 2.5* 2.8* 2.8*   GLOB 4.0 3.7 3.5   AGRAT 0.6* 0.8 0.8      CBC w/Diff Recent Labs     12/03/21  0358 12/02/21  1121 12/01/21  0640   WBC 6.7 4.4* 3.8*   RBC 4.82 5.13 5.21   HGB 13.7 14.7 14.8   HCT 40.8 43.1 43.6    235 152   GRANS 84* 80* 83*   LYMPH 8* 10* 15*   EOS 0 0 0      Microbiology Recent Labs     12/02/21  0445 12/02/21  0415   CULT NO GROWTH 1 DAY NO GROWTH 1 DAY          RADIOLOGY:    All available imaging studies/reports in [facility] for this admission were reviewed    High complexity decision making was performed during the evaluation of this patient at high risk for decompensation with multiple organ involvement         Disclaimer: Sections of this note are dictated utilizing voice recognition software, which may have resulted in some phonetic based errors in grammar and contents. Even though attempts were made to correct all the mistakes, some may have been missed, and remained in the body of the document.  If questions arise, please contact our department.     Dr. Bhavik Thomas, Infectious Disease Specialist  [phone]  December 3, 2021  11:26 AM

## 2021-12-03 NOTE — ROUTINE PROCESS
0715: Bedside and Verbal shift change report given to [address] (oncoming nurse) by Carley Felix (offgoing nurse). Report included the following information SBAR, Kardex, Intake/Output, MAR and Recent Results. 0830: Pt ambulated to bedside commode. Had medium, watery BM. Pt fairly tolerated. Dyspnea on exertion. 1910: Bedside and Verbal shift change report given to Carley Felix (oncoming nurse) by [address] (offgoing nurse). Report included the following information SBAR, Kardex, Intake/Output, MAR and Recent Results.

## 2021-12-04 LAB
ALBUMIN SERPL-MCNC: 2.5 G/DL (ref 3.4–5)
ALBUMIN/GLOB SERPL: 0.7 {RATIO} (ref 0.8–1.7)
ALP SERPL-CCNC: 113 U/L (ref 45–117)
ALT SERPL-CCNC: 366 U/L (ref 16–61)
ANION GAP SERPL CALC-SCNC: 7 MMOL/L (ref 3–18)
AST SERPL-CCNC: 232 U/L (ref 10–38)
BASOPHILS # BLD: 0 K/UL (ref 0–0.1)
BASOPHILS NFR BLD: 0 % (ref 0–2)
BILIRUB SERPL-MCNC: 0.5 MG/DL (ref 0.2–1)
BUN SERPL-MCNC: 28 MG/DL (ref 7–18)
BUN/CREAT SERPL: 24 (ref 12–20)
CALCIUM SERPL-MCNC: 8.3 MG/DL (ref 8.5–10.1)
CHLORIDE SERPL-SCNC: 106 MMOL/L (ref 100–111)
CO2 SERPL-SCNC: 26 MMOL/L (ref 21–32)
CREAT SERPL-MCNC: 1.17 MG/DL (ref 0.6–1.3)
CRP SERPL-MCNC: 1 MG/DL (ref 0–0.3)
D DIMER PPP FEU-MCNC: 0.5 UG/ML(FEU)
DIFFERENTIAL METHOD BLD: ABNORMAL
EOSINOPHIL # BLD: 0 K/UL (ref 0–0.4)
EOSINOPHIL NFR BLD: 0 % (ref 0–5)
ERYTHROCYTE [DISTWIDTH] IN BLOOD BY AUTOMATED COUNT: 12.6 % (ref 11.6–14.5)
GLOBULIN SER CALC-MCNC: 3.8 G/DL (ref 2–4)
GLUCOSE SERPL-MCNC: 138 MG/DL (ref 74–99)
HCT VFR BLD AUTO: 40.6 % (ref 36–48)
HGB BLD-MCNC: 13.6 G/DL (ref 13–16)
IMM GRANULOCYTES # BLD AUTO: 0 K/UL
IMM GRANULOCYTES NFR BLD AUTO: 0 %
LYMPHOCYTES # BLD: 0.9 K/UL (ref 0.9–3.6)
LYMPHOCYTES NFR BLD: 12 % (ref 21–52)
MCH RBC QN AUTO: 28.5 PG (ref 24–34)
MCHC RBC AUTO-ENTMCNC: 33.5 G/DL (ref 31–37)
MCV RBC AUTO: 85.1 FL (ref 78–100)
MONOCYTES # BLD: 0.2 K/UL (ref 0.05–1.2)
MONOCYTES NFR BLD: 3 % (ref 3–10)
NEUTS SEG # BLD: 6.3 K/UL (ref 1.8–8)
NEUTS SEG NFR BLD: 85 % (ref 40–73)
NRBC # BLD: 0 K/UL (ref 0–0.01)
NRBC BLD-RTO: 0 PER 100 WBC
PLATELET # BLD AUTO: 283 K/UL (ref 135–420)
PLATELET COMMENTS,PCOM: ABNORMAL
PMV BLD AUTO: 9.7 FL (ref 9.2–11.8)
POTASSIUM SERPL-SCNC: 4 MMOL/L (ref 3.5–5.5)
PROCALCITONIN SERPL-MCNC: 0.06 NG/ML
PROT SERPL-MCNC: 6.3 G/DL (ref 6.4–8.2)
RBC # BLD AUTO: 4.77 M/UL (ref 4.35–5.65)
RBC MORPH BLD: ABNORMAL
SODIUM SERPL-SCNC: 139 MMOL/L (ref 136–145)
WBC # BLD AUTO: 7.4 K/UL (ref 4.6–13.2)

## 2021-12-04 PROCEDURE — 74011250636 HC RX REV CODE- 250/636: Performed by: INTERNAL MEDICINE

## 2021-12-04 PROCEDURE — 84145 PROCALCITONIN (PCT): CPT

## 2021-12-04 PROCEDURE — 36415 COLL VENOUS BLD VENIPUNCTURE: CPT

## 2021-12-04 PROCEDURE — 85025 COMPLETE CBC W/AUTO DIFF WBC: CPT

## 2021-12-04 PROCEDURE — 85379 FIBRIN DEGRADATION QUANT: CPT

## 2021-12-04 PROCEDURE — 65660000000 HC RM CCU STEPDOWN

## 2021-12-04 PROCEDURE — 77010033711 HC HIGH FLOW OXYGEN

## 2021-12-04 PROCEDURE — 74011000250 HC RX REV CODE- 250: Performed by: INTERNAL MEDICINE

## 2021-12-04 PROCEDURE — 99232 SBSQ HOSP IP/OBS MODERATE 35: CPT | Performed by: INTERNAL MEDICINE

## 2021-12-04 PROCEDURE — 86140 C-REACTIVE PROTEIN: CPT

## 2021-12-04 PROCEDURE — 94762 N-INVAS EAR/PLS OXIMTRY CONT: CPT

## 2021-12-04 PROCEDURE — 74011250637 HC RX REV CODE- 250/637: Performed by: INTERNAL MEDICINE

## 2021-12-04 PROCEDURE — 80053 COMPREHEN METABOLIC PANEL: CPT

## 2021-12-04 RX ADMIN — FAMOTIDINE 20 MG: 20 TABLET ORAL at 08:51

## 2021-12-04 RX ADMIN — WATER 2 G: 1 INJECTION INTRAMUSCULAR; INTRAVENOUS; SUBCUTANEOUS at 10:08

## 2021-12-04 RX ADMIN — Medication 10 ML: at 13:36

## 2021-12-04 RX ADMIN — Medication 10 ML: at 05:26

## 2021-12-04 RX ADMIN — ENOXAPARIN SODIUM 40 MG: 100 INJECTION SUBCUTANEOUS at 08:51

## 2021-12-04 RX ADMIN — AZITHROMYCIN DIHYDRATE 500 MG: 500 INJECTION, POWDER, LYOPHILIZED, FOR SOLUTION INTRAVENOUS at 10:08

## 2021-12-04 RX ADMIN — DEXAMETHASONE SODIUM PHOSPHATE 10 MG: 4 INJECTION, SOLUTION INTRA-ARTICULAR; INTRALESIONAL; INTRAMUSCULAR; INTRAVENOUS; SOFT TISSUE at 22:25

## 2021-12-04 RX ADMIN — Medication 10 ML: at 22:25

## 2021-12-04 RX ADMIN — FAMOTIDINE 20 MG: 20 TABLET ORAL at 17:54

## 2021-12-04 RX ADMIN — DEXAMETHASONE SODIUM PHOSPHATE 10 MG: 4 INJECTION, SOLUTION INTRA-ARTICULAR; INTRALESIONAL; INTRAMUSCULAR; INTRAVENOUS; SOFT TISSUE at 08:52

## 2021-12-04 NOTE — ROUTINE PROCESS
1910-Bedside and Verbal shift change report given to María (oncoming nurse) by [address] (offgoing nurse). Report included the following information SBAR, Kardex and MAR.    0710-Bedside and Verbal shift change report given to Pierre Miles RN (oncoming nurse) by Rissa Renteria RN (offgoing nurse). Report included the following information SBAR, Kardex and MAR.

## 2021-12-04 NOTE — PROGRESS NOTES
[facility]    Patient: Bo Small Age: 45 y.o. : 1970 MR#: [id number] SSN: [id number]  Date: 2021     Subjective:   Patient reports that he is \"moving along\". He has no specific complaints. Appeared comfortable on oxygen supplementation, able to speak in full sentences. Assessment/Plan:   1. Acute hypoxic respiratory failure - cont on Hiflo at 40 LPM, wean as able  2. COVID pneumonia - cont IV decadron, DVT prophylaxis, follow inflammatory markers. s/p tocilizumab   3. Transaminitis - likely in setting of COVID infection, continues to improve, denies alcohol use          Additional Notes:      Case discussed with:  [x]Patient  []Family  [x]Nursing  []Case Management  DVT Prophylaxis:  [x]Lovenox  []Hep SQ  []SCDs  []Coumadin   []On Heparin gtt    Objective:     VS:   Visit Vitals  /73   Pulse 64   Temp 97.6 °F (36.4 °C)   Resp (!) 32   Ht 5' 9\" (1.753 m)   Wt 77.6 kg (171 lb 1.6 oz)   SpO2 96%   BMI 25.27 kg/m²      Tmax/24hrs: Temp (24hrs), Av.7 °F (36.5 °C), Min:97.2 °F (36.2 °C), Max:98.1 °F (36.7 °C)      Intake/Output Summary (Last 24 hours) at 2021 1643  Last data filed at 2021 1008  Gross per 24 hour   Intake 470 ml   Output 600 ml   Net -130 ml     General:  Alert, NAD  HEENT: NCAT  Cardiovascular:  No jvd, no peripheral edema  Pulmonary:    Normal respiratory effort  GI:  abd soft, nt, nd  Extremities:  No edema;   Neuro: alert and oriented   Labs / micro / imaging :    Recent Results (from the past 24 hour(s))   D DIMER    Collection Time: 21 12:39 AM   Result Value Ref Range    D DIMER 0.50 (H) <0.46 ug/ml(FEU)   METABOLIC PANEL, COMPREHENSIVE    Collection Time: 21 12:39 AM   Result Value Ref Range    Sodium 139 136 - 145 mmol/L    Potassium 4.0 3.5 - 5.5 mmol/L    Chloride 106 100 - 111 mmol/L    CO2 26 21 - 32 mmol/L    Anion gap 7 3.0 - 18 mmol/L    Glucose 138 (H) 74 - 99 mg/dL BUN 28 (H) 7.0 - 18 MG/DL    Creatinine 1.17 0.6 - 1.3 MG/DL    BUN/Creatinine ratio 24 (H) 12 - 20      GFR est AA >60 >60 ml/min/1.73m2    GFR est non-AA >60 >60 ml/min/1.73m2    Calcium 8.3 (L) 8.5 - 10.1 MG/DL    Bilirubin, total 0.5 0.2 - 1.0 MG/DL    ALT (SGPT) 366 (H) 16 - 61 U/L    AST (SGOT) 232 (H) 10 - 38 U/L    Alk. phosphatase 113 45 - 117 U/L    Protein, total 6.3 (L) 6.4 - 8.2 g/dL    Albumin 2.5 (L) 3.4 - 5.0 g/dL    Globulin 3.8 2.0 - 4.0 g/dL    A-G Ratio 0.7 (L) 0.8 - 1.7     CBC WITH AUTOMATED DIFF    Collection Time: 12/04/21 12:39 AM   Result Value Ref Range    WBC 7.4 4.6 - 13.2 K/uL    RBC 4.77 4.35 - 5.65 M/uL    HGB 13.6 13.0 - 16.0 g/dL    HCT 40.6 36.0 - 48.0 %    MCV 85.1 78.0 - 100.0 FL    MCH 28.5 24.0 - 34.0 PG    MCHC 33.5 31.0 - 37.0 g/dL    RDW 12.6 11.6 - 14.5 %    PLATELET 478 641 - 896 K/uL    MPV 9.7 9.2 - 11.8 FL    NRBC 0.0 0  WBC    ABSOLUTE NRBC 0.00 0.00 - 0.01 K/uL    NEUTROPHILS 85 (H) 40 - 73 %    LYMPHOCYTES 12 (L) 21 - 52 %    MONOCYTES 3 3 - 10 %    EOSINOPHILS 0 0 - 5 %    BASOPHILS 0 0 - 2 %    IMMATURE GRANULOCYTES 0 %    ABS. NEUTROPHILS 6.3 1.8 - 8.0 K/UL    ABS. LYMPHOCYTES 0.9 0.9 - 3.6 K/UL    ABS. MONOCYTES 0.2 0.05 - 1.2 K/UL    ABS. EOSINOPHILS 0.0 0.0 - 0.4 K/UL    ABS. BASOPHILS 0.0 0.0 - 0.1 K/UL    ABS. IMM.  GRANS. 0.0 K/UL    DF MANUAL      PLATELET COMMENTS ADEQUATE PLATELETS      RBC COMMENTS CONTRERAS CELLS  FEW  OVALOCYTES  FEW       C REACTIVE PROTEIN, QT    Collection Time: 12/04/21 12:39 AM   Result Value Ref Range    C-Reactive protein 1.0 (H) 0 - 0.3 mg/dL   PROCALCITONIN    Collection Time: 12/04/21 12:39 AM   Result Value Ref Range    Procalcitonin 0.06 ng/mL       Results     Procedure Component Value Units Date/Time    CULTURE, BLOOD [[id number]] Collected: 12/02/21 0445    Order Status: Completed Specimen: Blood Updated: 12/04/21 0703     Special Requests: NO SPECIAL REQUESTS        Culture result: NO GROWTH 2 DAYS       CULTURE, BLOOD [[id number]] Collected: 12/02/21 0415    Order Status: Completed Specimen: Blood Updated: 12/04/21 0703     Special Requests: NO SPECIAL REQUESTS        Culture result: NO GROWTH 2 DAYS             CTA CHEST W OR W WO CONT    Result Date: 12/1/2021  1. No CT evidence for central pulmonary embolism. 2. Mixed aggregate of predominantly peripheral upper and right middle lobe groundglass infiltrates and more dense and consolidative bilateral lower lobe subpleural airspace infiltrates. Findings would be compatible with Covid pneumonia distribution. -No effusions. XR CHEST PORT    Result Date: 12/1/2021  Low lung volumes. Multifocal streaky opacities likely represent patient's known covid 19 infection. US LIVER    Result Date: 12/1/2021  Limited evaluation due to bowel gas. Cholelithiasis, without evidence for cholecystitis. Increased echogenicity of the liver is nonspecific, most commonly seen with hepatic steatosis. Thank you for enabling us to participate in the care of this patient.        Signed By: Gerry Foley MD     December 4, 2021

## 2021-12-04 NOTE — PROGRESS NOTES
4055: Bedside shift change report given to Tony Hemphill RN (oncoming nurse) by Wesley Mota RN (offgoing nurse). Report included the following information SBAR, Kardex, Intake/Output, MAR and Cardiac Rhythm NSR/NATALI. 1200: Dr. Hank Solares rounding on pt. Bedside shift change report given to Sameer Thorne RN (oncoming nurse) by Tony Hemphill RN (offgoing nurse). Report included the following information SBAR, Kardex, Intake/Output, MAR and Cardiac Rhythm NSR/NATALI.

## 2021-12-05 LAB
ALBUMIN SERPL-MCNC: 2.5 G/DL (ref 3.4–5)
ALBUMIN/GLOB SERPL: 0.8 {RATIO} (ref 0.8–1.7)
ALP SERPL-CCNC: 101 U/L (ref 45–117)
ALT SERPL-CCNC: 352 U/L (ref 16–61)
ANION GAP SERPL CALC-SCNC: 7 MMOL/L (ref 3–18)
AST SERPL-CCNC: 135 U/L (ref 10–38)
BASOPHILS # BLD: 0 K/UL (ref 0–0.1)
BASOPHILS NFR BLD: 0 % (ref 0–2)
BILIRUB SERPL-MCNC: 0.5 MG/DL (ref 0.2–1)
BUN SERPL-MCNC: 27 MG/DL (ref 7–18)
BUN/CREAT SERPL: 29 (ref 12–20)
CALCIUM SERPL-MCNC: 8 MG/DL (ref 8.5–10.1)
CHLORIDE SERPL-SCNC: 108 MMOL/L (ref 100–111)
CO2 SERPL-SCNC: 25 MMOL/L (ref 21–32)
CREAT SERPL-MCNC: 0.92 MG/DL (ref 0.6–1.3)
CRP SERPL-MCNC: 0.5 MG/DL (ref 0–0.3)
D DIMER PPP FEU-MCNC: 0.55 UG/ML(FEU)
DIFFERENTIAL METHOD BLD: ABNORMAL
EOSINOPHIL # BLD: 0 K/UL (ref 0–0.4)
EOSINOPHIL NFR BLD: 0 % (ref 0–5)
ERYTHROCYTE [DISTWIDTH] IN BLOOD BY AUTOMATED COUNT: 12.6 % (ref 11.6–14.5)
GLOBULIN SER CALC-MCNC: 3.2 G/DL (ref 2–4)
GLUCOSE SERPL-MCNC: 130 MG/DL (ref 74–99)
HCT VFR BLD AUTO: 41 % (ref 36–48)
HGB BLD-MCNC: 13.5 G/DL (ref 13–16)
IMM GRANULOCYTES # BLD AUTO: 0.1 K/UL (ref 0–0.04)
IMM GRANULOCYTES NFR BLD AUTO: 1 % (ref 0–0.5)
LYMPHOCYTES # BLD: 0.5 K/UL (ref 0.9–3.6)
LYMPHOCYTES NFR BLD: 5 % (ref 21–52)
MCH RBC QN AUTO: 28.4 PG (ref 24–34)
MCHC RBC AUTO-ENTMCNC: 32.9 G/DL (ref 31–37)
MCV RBC AUTO: 86.1 FL (ref 78–100)
MONOCYTES # BLD: 0.5 K/UL (ref 0.05–1.2)
MONOCYTES NFR BLD: 6 % (ref 3–10)
NEUTS SEG # BLD: 7.9 K/UL (ref 1.8–8)
NEUTS SEG NFR BLD: 88 % (ref 40–73)
NRBC # BLD: 0 K/UL (ref 0–0.01)
NRBC BLD-RTO: 0 PER 100 WBC
PLATELET # BLD AUTO: 318 K/UL (ref 135–420)
PMV BLD AUTO: 9.8 FL (ref 9.2–11.8)
POTASSIUM SERPL-SCNC: 4.7 MMOL/L (ref 3.5–5.5)
PROCALCITONIN SERPL-MCNC: <0.05 NG/ML
PROT SERPL-MCNC: 5.7 G/DL (ref 6.4–8.2)
RBC # BLD AUTO: 4.76 M/UL (ref 4.35–5.65)
SODIUM SERPL-SCNC: 140 MMOL/L (ref 136–145)
WBC # BLD AUTO: 8.9 K/UL (ref 4.6–13.2)

## 2021-12-05 PROCEDURE — 85379 FIBRIN DEGRADATION QUANT: CPT

## 2021-12-05 PROCEDURE — 86140 C-REACTIVE PROTEIN: CPT

## 2021-12-05 PROCEDURE — 74011250636 HC RX REV CODE- 250/636: Performed by: INTERNAL MEDICINE

## 2021-12-05 PROCEDURE — 84145 PROCALCITONIN (PCT): CPT

## 2021-12-05 PROCEDURE — 99232 SBSQ HOSP IP/OBS MODERATE 35: CPT | Performed by: INTERNAL MEDICINE

## 2021-12-05 PROCEDURE — 80053 COMPREHEN METABOLIC PANEL: CPT

## 2021-12-05 PROCEDURE — 65660000000 HC RM CCU STEPDOWN

## 2021-12-05 PROCEDURE — 74011250637 HC RX REV CODE- 250/637: Performed by: INTERNAL MEDICINE

## 2021-12-05 PROCEDURE — 85025 COMPLETE CBC W/AUTO DIFF WBC: CPT

## 2021-12-05 PROCEDURE — 74011000250 HC RX REV CODE- 250: Performed by: INTERNAL MEDICINE

## 2021-12-05 PROCEDURE — 36415 COLL VENOUS BLD VENIPUNCTURE: CPT

## 2021-12-05 RX ADMIN — DEXAMETHASONE SODIUM PHOSPHATE 10 MG: 4 INJECTION, SOLUTION INTRA-ARTICULAR; INTRALESIONAL; INTRAMUSCULAR; INTRAVENOUS; SOFT TISSUE at 20:15

## 2021-12-05 RX ADMIN — FAMOTIDINE 20 MG: 20 TABLET ORAL at 09:43

## 2021-12-05 RX ADMIN — Medication 10 ML: at 06:00

## 2021-12-05 RX ADMIN — Medication 10 ML: at 13:19

## 2021-12-05 RX ADMIN — DEXAMETHASONE SODIUM PHOSPHATE 10 MG: 4 INJECTION, SOLUTION INTRA-ARTICULAR; INTRALESIONAL; INTRAMUSCULAR; INTRAVENOUS; SOFT TISSUE at 09:43

## 2021-12-05 RX ADMIN — AZITHROMYCIN DIHYDRATE 500 MG: 500 INJECTION, POWDER, LYOPHILIZED, FOR SOLUTION INTRAVENOUS at 09:52

## 2021-12-05 RX ADMIN — ENOXAPARIN SODIUM 40 MG: 100 INJECTION SUBCUTANEOUS at 09:44

## 2021-12-05 RX ADMIN — Medication 10 ML: at 20:16

## 2021-12-05 RX ADMIN — WATER 2 G: 1 INJECTION INTRAMUSCULAR; INTRAVENOUS; SUBCUTANEOUS at 09:52

## 2021-12-05 RX ADMIN — FAMOTIDINE 20 MG: 20 TABLET ORAL at 17:13

## 2021-12-05 NOTE — PROGRESS NOTES
[facility]  Progress Note    Patient: Mohinder Aguilar Age: 45 y.o. : 1970 MR#: [id number] SSN: [id number]  Date: 2021   Subjective:   Patient states his breathing is better. He has no specific complaints. Assessment/Plan:   1. Acute hypoxic respiratory failure - cont on Hiflo at 40 LPM, wean as able. CTA on  was negative for evidence of pulmonary embolism. 2. COVID pneumonia -on high-dose IV Decadron cont IV decadron, DVT prophylaxis, follow inflammatory markers. s/p tocilizumab. ID consulted  3. Transaminitis - likely in setting of COVID infection, continues to improve, denies alcohol use          Additional Notes:      Case discussed with:  [x]Patient  []Family  [x]Nursing  []Case Management  DVT Prophylaxis:  [x]Lovenox  []Hep SQ  []SCDs  []Coumadin   []On Heparin gtt    Objective:     VS:   Visit Vitals  /75 (BP 1 Location: Right upper arm, BP Patient Position: At rest)   Pulse (!) 55   Temp 97.5 °F (36.4 °C)   Resp 18   Ht 5' 9\" (1.753 m)   Wt 77.6 kg (171 lb 1.6 oz)   SpO2 94% Comment: Simultaneous filing. User may not have seen previous data. BMI 25.27 kg/m²      Tmax/24hrs: Temp (24hrs), Av °F (36.7 °C), Min:97.4 °F (36.3 °C), Max:98.7 °F (37.1 °C)      Intake/Output Summary (Last 24 hours) at 2021 1648  Last data filed at 2021 1754  Gross per 24 hour   Intake 150 ml   Output 300 ml   Net -150 ml     General:  Alert, NAD  HEENT: NCAT  Cardiovascular:  No jvd, no peripheral edema  Pulmonary:    Normal respiratory effort  GI:  abd soft, nt, nd  Extremities:  No edema;   Neuro: alert and oriented   Labs / micro / imaging :    Recent Results (from the past 24 hour(s))   D DIMER    Collection Time: 21  5:06 AM   Result Value Ref Range    D DIMER 0.55 (H) <0.46 ug/ml(FEU)   METABOLIC PANEL, COMPREHENSIVE    Collection Time: 21  5:06 AM   Result Value Ref Range    Sodium 140 136 - 145 mmol/L    Potassium 4.7 3.5 - 5.5 mmol/L    Chloride 108 100 - 111 mmol/L    CO2 25 21 - 32 mmol/L    Anion gap 7 3.0 - 18 mmol/L    Glucose 130 (H) 74 - 99 mg/dL    BUN 27 (H) 7.0 - 18 MG/DL    Creatinine 0.92 0.6 - 1.3 MG/DL    BUN/Creatinine ratio 29 (H) 12 - 20      GFR est AA >60 >60 ml/min/1.73m2    GFR est non-AA >60 >60 ml/min/1.73m2    Calcium 8.0 (L) 8.5 - 10.1 MG/DL    Bilirubin, total 0.5 0.2 - 1.0 MG/DL    ALT (SGPT) 352 (H) 16 - 61 U/L    AST (SGOT) 135 (H) 10 - 38 U/L    Alk. phosphatase 101 45 - 117 U/L    Protein, total 5.7 (L) 6.4 - 8.2 g/dL    Albumin 2.5 (L) 3.4 - 5.0 g/dL    Globulin 3.2 2.0 - 4.0 g/dL    A-G Ratio 0.8 0.8 - 1.7     CBC WITH AUTOMATED DIFF    Collection Time: 12/05/21  5:06 AM   Result Value Ref Range    WBC 8.9 4.6 - 13.2 K/uL    RBC 4.76 4.35 - 5.65 M/uL    HGB 13.5 13.0 - 16.0 g/dL    HCT 41.0 36.0 - 48.0 %    MCV 86.1 78.0 - 100.0 FL    MCH 28.4 24.0 - 34.0 PG    MCHC 32.9 31.0 - 37.0 g/dL    RDW 12.6 11.6 - 14.5 %    PLATELET 648 046 - 315 K/uL    MPV 9.8 9.2 - 11.8 FL    NRBC 0.0 0  WBC    ABSOLUTE NRBC 0.00 0.00 - 0.01 K/uL    NEUTROPHILS 88 (H) 40 - 73 %    LYMPHOCYTES 5 (L) 21 - 52 %    MONOCYTES 6 3 - 10 %    EOSINOPHILS 0 0 - 5 %    BASOPHILS 0 0 - 2 %    IMMATURE GRANULOCYTES 1 (H) 0.0 - 0.5 %    ABS. NEUTROPHILS 7.9 1.8 - 8.0 K/UL    ABS. LYMPHOCYTES 0.5 (L) 0.9 - 3.6 K/UL    ABS. MONOCYTES 0.5 0.05 - 1.2 K/UL    ABS. EOSINOPHILS 0.0 0.0 - 0.4 K/UL    ABS. BASOPHILS 0.0 0.0 - 0.1 K/UL    ABS. IMM.  GRANS. 0.1 (H) 0.00 - 0.04 K/UL    DF AUTOMATED     C REACTIVE PROTEIN, QT    Collection Time: 12/05/21  5:06 AM   Result Value Ref Range    C-Reactive protein 0.5 (H) 0 - 0.3 mg/dL   PROCALCITONIN    Collection Time: 12/05/21  5:06 AM   Result Value Ref Range    Procalcitonin <0.05 ng/mL       Results     Procedure Component Value Units Date/Time    CULTURE, BLOOD [[id number]] Collected: 12/02/21 0445    Order Status: Completed Specimen: Blood Updated: 12/05/21 5669     Special Requests: NO SPECIAL REQUESTS        Culture result: NO GROWTH 3 DAYS       CULTURE, BLOOD [[id number]] Collected: 12/02/21 0415    Order Status: Completed Specimen: Blood Updated: 12/05/21 0725     Special Requests: NO SPECIAL REQUESTS        Culture result: NO GROWTH 3 DAYS             CTA CHEST W OR W WO CONT    Result Date: 12/1/2021  1. No CT evidence for central pulmonary embolism. 2. Mixed aggregate of predominantly peripheral upper and right middle lobe groundglass infiltrates and more dense and consolidative bilateral lower lobe subpleural airspace infiltrates. Findings would be compatible with Covid pneumonia distribution. -No effusions. XR CHEST PORT    Result Date: 12/1/2021  Low lung volumes. Multifocal streaky opacities likely represent patient's known covid 19 infection. US LIVER    Result Date: 12/1/2021  Limited evaluation due to bowel gas. Cholelithiasis, without evidence for cholecystitis. Increased echogenicity of the liver is nonspecific, most commonly seen with hepatic steatosis. Thank you for enabling us to participate in the care of this patient.        Signed By: Carol Pires MD     December 5, 2021

## 2021-12-05 NOTE — PROGRESS NOTES
Problem: Risk for Spread of Infection  Goal: Prevent transmission of infectious organism to others  Description: Prevent the transmission of infectious organisms to other patients, staff members, and visitors. Outcome: Progressing Towards Goal     Problem: Patient Education:  Go to Education Activity  Goal: Patient/Family Education  Outcome: Progressing Towards Goal     Problem: Falls - Risk of  Goal: *Absence of Falls  Description: Document Morgan Flow Fall Risk and appropriate interventions in the flowsheet. Outcome: Progressing Towards Goal  Note: Fall Risk Interventions:            Medication Interventions: Bed/chair exit alarm, Patient to call before getting OOB, Teach patient to arise slowly                   Problem: Patient Education: Go to Patient Education Activity  Goal: Patient/Family Education  Outcome: Progressing Towards Goal     Problem: Pain  Goal: *Control of Pain  Outcome: Progressing Towards Goal  Goal: *PALLIATIVE CARE:  Alleviation of Pain  Outcome: Progressing Towards Goal     Problem: Patient Education: Go to Patient Education Activity  Goal: Patient/Family Education  Outcome: Progressing Towards Goal     Problem: Pressure Injury - Risk of  Goal: *Prevention of pressure injury  Description: Document Dann Scale and appropriate interventions in the flowsheet. Outcome: Progressing Towards Goal  Note: Pressure Injury Interventions:             Activity Interventions: Pressure redistribution bed/mattress(bed type)         Nutrition Interventions: Document food/fluid/supplement intake                     Problem: Patient Education: Go to Patient Education Activity  Goal: Patient/Family Education  Outcome: Progressing Towards Goal

## 2021-12-05 NOTE — PROGRESS NOTES
0720:Bedside shift change report given to [place], RN (oncoming nurse) by Yolis Lugo RN (offgoing nurse). Report included the following information SBAR, Kardex, Intake/Output, MAR and Cardiac Rhythm S NATALI. Bedside shift change report given to Law Bravo RN (oncoming nurse) by [place] RN (offgoing nurse). Report included the following information SBAR, Kardex, Intake/Output, MAR and Cardiac Rhythm NSR.

## 2021-12-06 LAB
ALBUMIN SERPL-MCNC: 2.4 G/DL (ref 3.4–5)
ALBUMIN/GLOB SERPL: 0.8 {RATIO} (ref 0.8–1.7)
ALP SERPL-CCNC: 95 U/L (ref 45–117)
ALT SERPL-CCNC: 323 U/L (ref 16–61)
ANION GAP SERPL CALC-SCNC: 6 MMOL/L (ref 3–18)
AST SERPL-CCNC: 81 U/L (ref 10–38)
BASOPHILS # BLD: 0 K/UL (ref 0–0.1)
BASOPHILS NFR BLD: 0 % (ref 0–2)
BILIRUB SERPL-MCNC: 0.5 MG/DL (ref 0.2–1)
BUN SERPL-MCNC: 27 MG/DL (ref 7–18)
BUN/CREAT SERPL: 27 (ref 12–20)
CALCIUM SERPL-MCNC: 7.8 MG/DL (ref 8.5–10.1)
CHLORIDE SERPL-SCNC: 108 MMOL/L (ref 100–111)
CO2 SERPL-SCNC: 25 MMOL/L (ref 21–32)
CREAT SERPL-MCNC: 0.99 MG/DL (ref 0.6–1.3)
CRP SERPL-MCNC: 0.4 MG/DL (ref 0–0.3)
D DIMER PPP FEU-MCNC: 0.71 UG/ML(FEU)
DIFFERENTIAL METHOD BLD: ABNORMAL
EOSINOPHIL # BLD: 0 K/UL (ref 0–0.4)
EOSINOPHIL NFR BLD: 0 % (ref 0–5)
ERYTHROCYTE [DISTWIDTH] IN BLOOD BY AUTOMATED COUNT: 12.5 % (ref 11.6–14.5)
GLOBULIN SER CALC-MCNC: 3.1 G/DL (ref 2–4)
GLUCOSE SERPL-MCNC: 128 MG/DL (ref 74–99)
HCT VFR BLD AUTO: 41.3 % (ref 36–48)
HGB BLD-MCNC: 13.3 G/DL (ref 13–16)
IMM GRANULOCYTES # BLD AUTO: 0.1 K/UL (ref 0–0.04)
IMM GRANULOCYTES NFR BLD AUTO: 1 % (ref 0–0.5)
LYMPHOCYTES # BLD: 0.4 K/UL (ref 0.9–3.6)
LYMPHOCYTES NFR BLD: 5 % (ref 21–52)
MCH RBC QN AUTO: 27.9 PG (ref 24–34)
MCHC RBC AUTO-ENTMCNC: 32.2 G/DL (ref 31–37)
MCV RBC AUTO: 86.6 FL (ref 78–100)
MONOCYTES # BLD: 0.6 K/UL (ref 0.05–1.2)
MONOCYTES NFR BLD: 6 % (ref 3–10)
NEUTS SEG # BLD: 8.8 K/UL (ref 1.8–8)
NEUTS SEG NFR BLD: 89 % (ref 40–73)
NRBC # BLD: 0 K/UL (ref 0–0.01)
NRBC BLD-RTO: 0 PER 100 WBC
PLATELET # BLD AUTO: 306 K/UL (ref 135–420)
PMV BLD AUTO: 9.9 FL (ref 9.2–11.8)
POTASSIUM SERPL-SCNC: 5 MMOL/L (ref 3.5–5.5)
PROCALCITONIN SERPL-MCNC: <0.05 NG/ML
PROT SERPL-MCNC: 5.5 G/DL (ref 6.4–8.2)
RBC # BLD AUTO: 4.77 M/UL (ref 4.35–5.65)
SODIUM SERPL-SCNC: 139 MMOL/L (ref 136–145)
WBC # BLD AUTO: 9.9 K/UL (ref 4.6–13.2)

## 2021-12-06 PROCEDURE — 74011250636 HC RX REV CODE- 250/636: Performed by: INTERNAL MEDICINE

## 2021-12-06 PROCEDURE — 74011250637 HC RX REV CODE- 250/637: Performed by: INTERNAL MEDICINE

## 2021-12-06 PROCEDURE — 85025 COMPLETE CBC W/AUTO DIFF WBC: CPT

## 2021-12-06 PROCEDURE — 84145 PROCALCITONIN (PCT): CPT

## 2021-12-06 PROCEDURE — 86140 C-REACTIVE PROTEIN: CPT

## 2021-12-06 PROCEDURE — 65660000000 HC RM CCU STEPDOWN

## 2021-12-06 PROCEDURE — 85379 FIBRIN DEGRADATION QUANT: CPT

## 2021-12-06 PROCEDURE — 36415 COLL VENOUS BLD VENIPUNCTURE: CPT

## 2021-12-06 PROCEDURE — 80053 COMPREHEN METABOLIC PANEL: CPT

## 2021-12-06 PROCEDURE — 99232 SBSQ HOSP IP/OBS MODERATE 35: CPT | Performed by: INTERNAL MEDICINE

## 2021-12-06 RX ORDER — POLYETHYLENE GLYCOL 3350 17 G/17G
17 POWDER, FOR SOLUTION ORAL 2 TIMES DAILY
Status: DISCONTINUED | OUTPATIENT
Start: 2021-12-06 | End: 2021-12-08

## 2021-12-06 RX ORDER — DEXAMETHASONE SODIUM PHOSPHATE 4 MG/ML
10 INJECTION, SOLUTION INTRA-ARTICULAR; INTRALESIONAL; INTRAMUSCULAR; INTRAVENOUS; SOFT TISSUE EVERY 24 HOURS
Status: COMPLETED | OUTPATIENT
Start: 2021-12-06 | End: 2021-12-10

## 2021-12-06 RX ADMIN — POLYETHYLENE GLYCOL 3350 17 G: 17 POWDER, FOR SOLUTION ORAL at 21:49

## 2021-12-06 RX ADMIN — FAMOTIDINE 20 MG: 20 TABLET ORAL at 09:11

## 2021-12-06 RX ADMIN — AZITHROMYCIN DIHYDRATE 500 MG: 500 INJECTION, POWDER, LYOPHILIZED, FOR SOLUTION INTRAVENOUS at 13:26

## 2021-12-06 RX ADMIN — DEXAMETHASONE SODIUM PHOSPHATE 10 MG: 4 INJECTION, SOLUTION INTRAMUSCULAR; INTRAVENOUS at 20:35

## 2021-12-06 RX ADMIN — ENOXAPARIN SODIUM 40 MG: 100 INJECTION SUBCUTANEOUS at 09:11

## 2021-12-06 RX ADMIN — FAMOTIDINE 20 MG: 20 TABLET ORAL at 18:07

## 2021-12-06 RX ADMIN — Medication 10 ML: at 06:20

## 2021-12-06 RX ADMIN — Medication 10 ML: at 21:49

## 2021-12-06 RX ADMIN — Medication 10 ML: at 13:26

## 2021-12-06 NOTE — PROGRESS NOTES
Infectious Disease progress Note        Reason: Acute hypoxic respiratory failure, confirmed COVID-19 pneumonia    Current abx Prior abx   Ceftriaxone, azithromycin since 12/1/2021      Lines:       Assessment :    23-year-old man with no significant past medical history admitted to [facility] on 12/1/2021 with increasing shortness of breath for 2 to 3 days. Symptoms of COVID-19 since 11/22/2021  Positive Covid test 11/26/2021    Sudden worsening hypoxia with oxygen saturation in 70s on presentation  Rapidly worsening oxygenation now on high flow oxygen at 40 L, tachypnea, elevated ferritin- 7000s, crp: 7.8    Clinical presentation consistent with acute hypoxic respiratory failure-present on admission due to severe COVID-19 pneumonia    S/p high dose decadron, tocilizumab since 12/1/21    Mild elevated procalcitonin-could be secondary to severe COVID-19 pneumonia. Will monitor for superimposed bacterial infection    Mild elevated Q-ipatf-wjxrbs due to severe COVID-19 pneumonia. No evidence of pulmonary bolus him noted on CTA chest 12/1/2021    Unvaccinated patient-high risk for clinical decompensation. Patient doesn't think he wants to be intubated. But still not ready to sign DNR/DNI    Clinically better. +nt constipation    Recommendations:    1. Decrease decadron to 10mg IV q 24 hour till 12/10/21  2. D/c  Ceftriaxone,d/c  azithromycin after tomorrow's dose  3. Monitor CRP, D-dimer, procalcitonin  4. Titrate oxygen as tolerated  5. Prophylactic anticoagulation per primary team  6. Start miralax       Above plan was discussed in details with patient,RN. Please call me if any further questions or concerns. Will continue to participate in the care of this patient. HPI:    Feels better. Patient denies any pleuritic chest pain. C/o constipation. There are no discharge medications for this patient.       Current Facility-Administered Medications   Medication Dose Route Frequency    sodium chloride (NS) flush 5-40 mL  5-40 mL IntraVENous Q8H    sodium chloride (NS) flush 5-40 mL  5-40 mL IntraVENous PRN    polyethylene glycol (MIRALAX) packet 17 g  17 g Oral DAILY PRN    ondansetron (ZOFRAN ODT) tablet 4 mg  4 mg Oral Q8H PRN    Or    ondansetron (ZOFRAN) injection 4 mg  4 mg IntraVENous Q6H PRN    enoxaparin (LOVENOX) injection 40 mg  40 mg SubCUTAneous DAILY    famotidine (PEPCID) tablet 20 mg  20 mg Oral BID    cefTRIAXone (ROCEPHIN) 2 g in sterile water (preservative free) 20 mL IV syringe  2 g IntraVENous Q24H    azithromycin (ZITHROMAX) 500 mg in 0.9% sodium chloride 250 mL (VIAL-MATE)  500 mg IntraVENous Q24H    acetaminophen (TYLENOL) tablet 650 mg  650 mg Oral Q6H PRN    Or    acetaminophen (TYLENOL) suppository 650 mg  650 mg Rectal Q6H PRN    albuterol-ipratropium (DUO-NEB) 2.5 MG-0.5 MG/3 ML  3 mL Nebulization Q4H PRN    dexamethasone (DECADRON) 4 mg/mL injection 10 mg  10 mg IntraVENous Q12H       Allergies: Codeine    History reviewed. No pertinent family history. Social History     Socioeconomic History    Marital status:      Spouse name: Not on file    Number of children: Not on file    Years of education: Not on file    Highest education level: Not on file   Occupational History    Not on file   Tobacco Use    Smoking status: Never Smoker    Smokeless tobacco: Never Used   Substance and Sexual Activity    Alcohol use: No    Drug use: Not Currently     Types: Marijuana, Cocaine     Comment: Stopped using when he was 23years old    Sexual activity: Yes   Other Topics Concern    Not on file   Social History Narrative    Not on file     Social Determinants of Health     Financial Resource Strain:     Difficulty of Paying Living Expenses: Not on file   Food Insecurity:     Worried About Running Out of Food in the Last Year: Not on file    Lindsay of Food in the Last Year: Not on file   Transportation Needs:     Lack of Transportation (Medical):  Not on file    Lack of Transportation (Non-Medical): Not on file   Physical Activity:     Days of Exercise per Week: Not on file    Minutes of Exercise per Session: Not on file   Stress:     Feeling of Stress : Not on file   Social Connections:     Frequency of Communication with Friends and Family: Not on file    Frequency of Social Gatherings with Friends and Family: Not on file    Attends Orthodox Services: Not on file    Active Member of [address] [place] [address] or Organizations: Not on file    Attends Club or Organization Meetings: Not on file    Marital Status: Not on file   Intimate Partner Violence:     Fear of Current or Ex-Partner: Not on file    Emotionally Abused: Not on file    Physically Abused: Not on file    Sexually Abused: Not on file   Housing Stability:     Unable to Pay for Housing in the Last Year: Not on file    Number of [place] in the Last Year: Not on file    Unstable Housing in the Last Year: Not on file     Social History     Tobacco Use   Smoking Status Never Smoker   Smokeless Tobacco Never Used        Temp (24hrs), Av °F (36.7 °C), Min:97.4 °F (36.3 °C), Max:98.7 °F (37.1 °C)    Visit Vitals  /75   Pulse (!) 53   Temp 97.8 °F (36.6 °C)   Resp 21   Ht 5' 9\" (1.753 m)   Wt 76.6 kg (168 lb 14.4 oz)   SpO2 97%   BMI 24.94 kg/m²       ROS: 12 point ROS obtained in details. Pertinent positives as mentioned in HPI,   otherwise negative    Physical Exam:    Vitals signs and nursing note reviewed. Sitting on bed. Appears tachypneic      HENT:      Head: Normocephalic. Eyes:      Conjunctiva/sclera: Conjunctivae normal.      Neck:      Musculoskeletal: Normal range of motion and neck supple. Cardiovascular:      Rate and Rhythm: Normal rate and regular rhythm on monitor  Chest:      Bilateral chest movements equal.  Auscultation deferred due to Covid positive  Abdominal:      General: There is no distension. Palpations: Abdomen is soft. Tenderness: There is no abdominal tenderness. There is no rebound. Musculoskeletal: Normal range of motion. General: No tenderness. Skin:     General: Skin is warm and dry. Findings: No rash. Neurological:      Mental Status: He is alert and oriented to person, place, and time. Cranial Nerves: No cranial nerve deficit. Motor: No abnormal muscle tone. Coordination: Coordination normal.   Psychiatric:         Behavior: Behavior normal.         Thought Content: Thought content normal.         Judgment: Judgment normal.       Labs: Results:   Chemistry Recent Labs     12/06/21  0120 12/05/21  0506 12/04/21  0039   * 130* 138*    140 139   K 5.0 4.7 4.0    108 106   CO2 25 25 26   BUN 27* 27* 28*   CREA 0.99 0.92 1.17   CA 7.8* 8.0* 8.3*   AGAP 6 7 7   BUCR 27* 29* 24*   AP 95 101 113   TP 5.5* 5.7* 6.3*   ALB 2.4* 2.5* 2.5*   GLOB 3.1 3.2 3.8   AGRAT 0.8 0.8 0.7*      CBC w/Diff Recent Labs     12/06/21  0120 12/05/21  0506 12/04/21  0039   WBC 9.9 8.9 7.4   RBC 4.77 4.76 4.77   HGB 13.3 13.5 13.6   HCT 41.3 41.0 40.6    318 283   GRANS 89* 88* 85*   LYMPH 5* 5* 12*   EOS 0 0 0      Microbiology No results for input(s): CULT in the last 72 hours. RADIOLOGY:    All available imaging studies/reports in [facility] for this admission were reviewed      Disclaimer: Sections of this note are dictated utilizing voice recognition software, which may have resulted in some phonetic based errors in grammar and contents. Even though attempts were made to correct all the mistakes, some may have been missed, and remained in the body of the document. If questions arise, please contact our department.     Dr. Dominique Sanitago, Infectious Disease Specialist  [phone]  December 6, 2021  11:26 AM

## 2021-12-06 NOTE — PROGRESS NOTES
Problem: Risk for Spread of Infection  Goal: Prevent transmission of infectious organism to others  Description: Prevent the transmission of infectious organisms to other patients, staff members, and visitors. Outcome: Progressing Towards Goal     Problem: Patient Education:  Go to Education Activity  Goal: Patient/Family Education  Outcome: Progressing Towards Goal     Problem: Falls - Risk of  Goal: *Absence of Falls  Description: Document Ainsley Bennett Fall Risk and appropriate interventions in the flowsheet. Outcome: Progressing Towards Goal  Note: Fall Risk Interventions:            Medication Interventions: Bed/chair exit alarm, Patient to call before getting OOB, Teach patient to arise slowly                   Problem: Patient Education: Go to Patient Education Activity  Goal: Patient/Family Education  Outcome: Progressing Towards Goal     Problem: Pain  Goal: *Control of Pain  Outcome: Progressing Towards Goal  Goal: *PALLIATIVE CARE:  Alleviation of Pain  Outcome: Progressing Towards Goal     Problem: Patient Education: Go to Patient Education Activity  Goal: Patient/Family Education  Outcome: Progressing Towards Goal     Problem: Pressure Injury - Risk of  Goal: *Prevention of pressure injury  Description: Document Dann Scale and appropriate interventions in the flowsheet. Outcome: Progressing Towards Goal  Note: Pressure Injury Interventions:             Activity Interventions: Assess need for specialty bed, Pressure redistribution bed/mattress(bed type), PT/OT evaluation         Nutrition Interventions: Document food/fluid/supplement intake, Discuss nutritional consult with provider, Offer support with meals,snacks and hydration                     Problem: Patient Education: Go to Patient Education Activity  Goal: Patient/Family Education  Outcome: Progressing Towards Goal     Problem: Airway Clearance - Ineffective  Goal: Achieve or maintain patent airway  Outcome: Progressing Towards Goal     Problem: Gas Exchange - Impaired  Goal: Absence of hypoxia  Outcome: Progressing Towards Goal  Goal: Promote optimal lung function  Outcome: Progressing Towards Goal     Problem: Breathing Pattern - Ineffective  Goal: Ability to achieve and maintain a regular respiratory rate  Outcome: Progressing Towards Goal     Problem:  Body Temperature -  Risk of, Imbalanced  Goal: Ability to maintain a body temperature within defined limits  Outcome: Progressing Towards Goal  Goal: Will regain or maintain usual level of consciousness  Outcome: Progressing Towards Goal  Goal: Complications related to the disease process, condition or treatment will be avoided or minimized  Outcome: Progressing Towards Goal     Problem: Isolation Precautions - Risk of Spread of Infection  Goal: Prevent transmission of infectious organism to others  Outcome: Progressing Towards Goal     Problem: Nutrition Deficits  Goal: Optimize nutrtional status  Outcome: Progressing Towards Goal     Problem: Risk for Fluid Volume Deficit  Goal: Maintain normal heart rhythm  Outcome: Progressing Towards Goal  Goal: Maintain absence of muscle cramping  Outcome: Progressing Towards Goal  Goal: Maintain normal serum potassium, sodium, calcium, phosphorus, and pH  Outcome: Progressing Towards Goal     Problem: Loneliness or Risk for Loneliness  Goal: Demonstrate positive use of time alone when socialization is not possible  Outcome: Progressing Towards Goal     Problem: Fatigue  Goal: Verbalize increase energy and improved vitality  Outcome: Progressing Towards Goal     Problem: Patient Education: Go to Patient Education Activity  Goal: Patient/Family Education  Outcome: Progressing Towards Goal

## 2021-12-06 NOTE — PROGRESS NOTES
Chart reviewed. Pt continues on 40L HFNC. Lives at home with wife and children; normally independent. Plan is home with possible home health. Will most likely need home oxygen also. Pt will need a PCP set up prior to discharge. Will continue to monitor for discharge needs.   Erin Pollock RN - Outcomes Manager  [phone]

## 2021-12-06 NOTE — ROUTINE PROCESS
0715: Bedside and Verbal shift change report given to [address] (oncoming nurse) by Grabiel David RN (offgoing nurse). Report included the following information SBAR, Kardex, Intake/Output, MAR and Recent Results. 1925: Uneventful day. Pt remained on 40L 65% HiFlow NC.    1930: Bedside and Verbal shift change report given to [address] (oncoming nurse) by [address] (offgoing nurse). Report included the following information SBAR, Kardex, Intake/Output, MAR and Recent Results.

## 2021-12-06 NOTE — ROUTINE PROCESS
2006 Assumed care of patient from off going nurse. Patient resting in bed. No distress noted. Patient on Vapotherm 40L/60%. Sats 97%. Encouraged to use ICS at bedside. Assessment perfomed. See flow sheet. Call bell within reach, siderails up x 3, bed in lowest position, and patient instructed to use call bell for assistance. Will continue to monitor. 4018 Bedside and Verbal shift change report given to [address] (oncoming nurse) by Vincenzo Kincaid RN (offgoing nurse).  Report included the following information SBAR, Intake/Output, MAR, Recent Results and Cardiac Rhythm SB.

## 2021-12-06 NOTE — PROGRESS NOTES
[facility]  [facility]    Patient: Bo Small Age: 45 y.o. : 1970 MR#: [id number] SSN: [id number]  Date: 2021   Subjective:   Patient states his breathing is about the same, appears comfortable. He has no specific complaints. Assessment/Plan:   1. Acute hypoxic respiratory failure - cont on Hiflo at 40 LPM, wean as able. CTA on  was negative for evidence of pulmonary embolism. 2. COVID pneumonia -on high-dose IV Decadron cont IV decadron, DVT prophylaxis, follow inflammatory markers. s/p tocilizumab. ID consulted  3. Transaminitis - likely in setting of COVID infection, continues to improve, denies alcohol use          Additional Notes:      Case discussed with:  [x]Patient  []Family  [x]Nursing  []Case Management  DVT Prophylaxis:  [x]Lovenox  []Hep SQ  []SCDs  []Coumadin   []On Heparin gtt    Objective:     VS:   Visit Vitals  /82   Pulse (!) 59   Temp 97.4 °F (36.3 °C)   Resp 26   Ht 5' 9\" (1.753 m)   Wt 76.6 kg (168 lb 14.4 oz)   SpO2 96%   BMI 24.94 kg/m²      Tmax/24hrs: Temp (24hrs), Av.6 °F (36.4 °C), Min:97.4 °F (36.3 °C), Max:97.8 °F (36.6 °C)      Intake/Output Summary (Last 24 hours) at 2021 1711  Last data filed at 2021 0425  Gross per 24 hour   Intake 240 ml   Output 1100 ml   Net -860 ml     General:  Alert, NAD  HEENT: NCAT  Cardiovascular:  No jvd, no peripheral edema  Pulmonary:    Normal respiratory effort  GI:  abd soft, nt, nd  Extremities:  No edema;   Neuro: alert and oriented   Labs / micro / imaging :    Recent Results (from the past 24 hour(s))   D DIMER    Collection Time: 21  1:20 AM   Result Value Ref Range    D DIMER 0.71 (H) <0.46 ug/ml(FEU)   METABOLIC PANEL, COMPREHENSIVE    Collection Time: 21  1:20 AM   Result Value Ref Range    Sodium 139 136 - 145 mmol/L    Potassium 5.0 3.5 - 5.5 mmol/L    Chloride 108 100 - 111 mmol/L    CO2 25 21 - 32 mmol/L    Anion gap 6 3.0 - 18 mmol/L    Glucose 128 (H) 74 - 99 mg/dL    BUN 27 (H) 7.0 - 18 MG/DL    Creatinine 0.99 0.6 - 1.3 MG/DL    BUN/Creatinine ratio 27 (H) 12 - 20      GFR est AA >60 >60 ml/min/1.73m2    GFR est non-AA >60 >60 ml/min/1.73m2    Calcium 7.8 (L) 8.5 - 10.1 MG/DL    Bilirubin, total 0.5 0.2 - 1.0 MG/DL    ALT (SGPT) 323 (H) 16 - 61 U/L    AST (SGOT) 81 (H) 10 - 38 U/L    Alk. phosphatase 95 45 - 117 U/L    Protein, total 5.5 (L) 6.4 - 8.2 g/dL    Albumin 2.4 (L) 3.4 - 5.0 g/dL    Globulin 3.1 2.0 - 4.0 g/dL    A-G Ratio 0.8 0.8 - 1.7     CBC WITH AUTOMATED DIFF    Collection Time: 12/06/21  1:20 AM   Result Value Ref Range    WBC 9.9 4.6 - 13.2 K/uL    RBC 4.77 4.35 - 5.65 M/uL    HGB 13.3 13.0 - 16.0 g/dL    HCT 41.3 36.0 - 48.0 %    MCV 86.6 78.0 - 100.0 FL    MCH 27.9 24.0 - 34.0 PG    MCHC 32.2 31.0 - 37.0 g/dL    RDW 12.5 11.6 - 14.5 %    PLATELET 126 386 - 815 K/uL    MPV 9.9 9.2 - 11.8 FL    NRBC 0.0 0  WBC    ABSOLUTE NRBC 0.00 0.00 - 0.01 K/uL    NEUTROPHILS 89 (H) 40 - 73 %    LYMPHOCYTES 5 (L) 21 - 52 %    MONOCYTES 6 3 - 10 %    EOSINOPHILS 0 0 - 5 %    BASOPHILS 0 0 - 2 %    IMMATURE GRANULOCYTES 1 (H) 0.0 - 0.5 %    ABS. NEUTROPHILS 8.8 (H) 1.8 - 8.0 K/UL    ABS. LYMPHOCYTES 0.4 (L) 0.9 - 3.6 K/UL    ABS. MONOCYTES 0.6 0.05 - 1.2 K/UL    ABS. EOSINOPHILS 0.0 0.0 - 0.4 K/UL    ABS. BASOPHILS 0.0 0.0 - 0.1 K/UL    ABS. IMM.  GRANS. 0.1 (H) 0.00 - 0.04 K/UL    DF AUTOMATED     C REACTIVE PROTEIN, QT    Collection Time: 12/06/21  1:20 AM   Result Value Ref Range    C-Reactive protein 0.4 (H) 0 - 0.3 mg/dL   PROCALCITONIN    Collection Time: 12/06/21  1:20 AM   Result Value Ref Range    Procalcitonin <0.05 ng/mL       Results     Procedure Component Value Units Date/Time    CULTURE, BLOOD [[id number]] Collected: 12/02/21 0445    Order Status: Completed Specimen: Blood Updated: 12/06/21 0705     Special Requests: NO SPECIAL REQUESTS        Culture result: NO GROWTH 4 DAYS       CULTURE, BLOOD [[id number]] Collected: 12/02/21 0415    Order Status: Completed Specimen: Blood Updated: 12/06/21 0705     Special Requests: NO SPECIAL REQUESTS        Culture result: NO GROWTH 4 DAYS             CTA CHEST W OR W WO CONT    Result Date: 12/1/2021  1. No CT evidence for central pulmonary embolism. 2. Mixed aggregate of predominantly peripheral upper and right middle lobe groundglass infiltrates and more dense and consolidative bilateral lower lobe subpleural airspace infiltrates. Findings would be compatible with Covid pneumonia distribution. -No effusions. XR CHEST PORT    Result Date: 12/1/2021  Low lung volumes. Multifocal streaky opacities likely represent patient's known covid 19 infection. US LIVER    Result Date: 12/1/2021  Limited evaluation due to bowel gas. Cholelithiasis, without evidence for cholecystitis. Increased echogenicity of the liver is nonspecific, most commonly seen with hepatic steatosis. Thank you for enabling us to participate in the care of this patient.        Signed By: Maty Mendiola MD     December 6, 2021

## 2021-12-07 LAB
ALBUMIN SERPL-MCNC: 2.4 G/DL (ref 3.4–5)
ALBUMIN/GLOB SERPL: 0.8 {RATIO} (ref 0.8–1.7)
ALP SERPL-CCNC: 91 U/L (ref 45–117)
ALT SERPL-CCNC: 275 U/L (ref 16–61)
ANION GAP SERPL CALC-SCNC: 4 MMOL/L (ref 3–18)
AST SERPL-CCNC: 71 U/L (ref 10–38)
BASOPHILS # BLD: 0 K/UL (ref 0–0.1)
BASOPHILS NFR BLD: 0 % (ref 0–2)
BILIRUB SERPL-MCNC: 0.6 MG/DL (ref 0.2–1)
BUN SERPL-MCNC: 26 MG/DL (ref 7–18)
BUN/CREAT SERPL: 28 (ref 12–20)
CALCIUM SERPL-MCNC: 8 MG/DL (ref 8.5–10.1)
CHLORIDE SERPL-SCNC: 107 MMOL/L (ref 100–111)
CO2 SERPL-SCNC: 25 MMOL/L (ref 21–32)
CREAT SERPL-MCNC: 0.94 MG/DL (ref 0.6–1.3)
CRP SERPL-MCNC: <0.3 MG/DL (ref 0–0.3)
D DIMER PPP FEU-MCNC: 1.05 UG/ML(FEU)
DIFFERENTIAL METHOD BLD: ABNORMAL
EOSINOPHIL # BLD: 0 K/UL (ref 0–0.4)
EOSINOPHIL NFR BLD: 0 % (ref 0–5)
ERYTHROCYTE [DISTWIDTH] IN BLOOD BY AUTOMATED COUNT: 12.3 % (ref 11.6–14.5)
GLOBULIN SER CALC-MCNC: 3.2 G/DL (ref 2–4)
GLUCOSE SERPL-MCNC: 127 MG/DL (ref 74–99)
HCT VFR BLD AUTO: 44 % (ref 36–48)
HGB BLD-MCNC: 14.4 G/DL (ref 13–16)
IMM GRANULOCYTES # BLD AUTO: 0.1 K/UL (ref 0–0.04)
IMM GRANULOCYTES NFR BLD AUTO: 1 % (ref 0–0.5)
LYMPHOCYTES # BLD: 0.4 K/UL (ref 0.9–3.6)
LYMPHOCYTES NFR BLD: 4 % (ref 21–52)
MCH RBC QN AUTO: 28.5 PG (ref 24–34)
MCHC RBC AUTO-ENTMCNC: 32.7 G/DL (ref 31–37)
MCV RBC AUTO: 87.1 FL (ref 78–100)
MONOCYTES # BLD: 0.4 K/UL (ref 0.05–1.2)
MONOCYTES NFR BLD: 4 % (ref 3–10)
NEUTS SEG # BLD: 8.7 K/UL (ref 1.8–8)
NEUTS SEG NFR BLD: 92 % (ref 40–73)
NRBC # BLD: 0 K/UL (ref 0–0.01)
NRBC BLD-RTO: 0 PER 100 WBC
PLATELET # BLD AUTO: 233 K/UL (ref 135–420)
PMV BLD AUTO: 11.2 FL (ref 9.2–11.8)
POTASSIUM SERPL-SCNC: 5.6 MMOL/L (ref 3.5–5.5)
PROCALCITONIN SERPL-MCNC: <0.05 NG/ML
PROT SERPL-MCNC: 5.6 G/DL (ref 6.4–8.2)
RBC # BLD AUTO: 5.05 M/UL (ref 4.35–5.65)
SODIUM SERPL-SCNC: 136 MMOL/L (ref 136–145)
WBC # BLD AUTO: 9.5 K/UL (ref 4.6–13.2)

## 2021-12-07 PROCEDURE — 80053 COMPREHEN METABOLIC PANEL: CPT

## 2021-12-07 PROCEDURE — 85025 COMPLETE CBC W/AUTO DIFF WBC: CPT

## 2021-12-07 PROCEDURE — 74011250637 HC RX REV CODE- 250/637: Performed by: INTERNAL MEDICINE

## 2021-12-07 PROCEDURE — 65660000000 HC RM CCU STEPDOWN

## 2021-12-07 PROCEDURE — 87186 SC STD MICRODIL/AGAR DIL: CPT

## 2021-12-07 PROCEDURE — 99233 SBSQ HOSP IP/OBS HIGH 50: CPT | Performed by: FAMILY MEDICINE

## 2021-12-07 PROCEDURE — 86140 C-REACTIVE PROTEIN: CPT

## 2021-12-07 PROCEDURE — 94762 N-INVAS EAR/PLS OXIMTRY CONT: CPT

## 2021-12-07 PROCEDURE — 74011250636 HC RX REV CODE- 250/636: Performed by: INTERNAL MEDICINE

## 2021-12-07 PROCEDURE — 84145 PROCALCITONIN (PCT): CPT

## 2021-12-07 PROCEDURE — 87077 CULTURE AEROBIC IDENTIFY: CPT

## 2021-12-07 PROCEDURE — 85379 FIBRIN DEGRADATION QUANT: CPT

## 2021-12-07 PROCEDURE — 87070 CULTURE OTHR SPECIMN AEROBIC: CPT

## 2021-12-07 PROCEDURE — 74011250637 HC RX REV CODE- 250/637: Performed by: FAMILY MEDICINE

## 2021-12-07 PROCEDURE — 36415 COLL VENOUS BLD VENIPUNCTURE: CPT

## 2021-12-07 PROCEDURE — 77010033711 HC HIGH FLOW OXYGEN

## 2021-12-07 RX ORDER — FACIAL-BODY WIPES
10 EACH TOPICAL ONCE
Status: COMPLETED | OUTPATIENT
Start: 2021-12-08 | End: 2021-12-08

## 2021-12-07 RX ORDER — DOCUSATE SODIUM 100 MG/1
100 CAPSULE, LIQUID FILLED ORAL DAILY
Status: DISCONTINUED | OUTPATIENT
Start: 2021-12-07 | End: 2021-12-15 | Stop reason: HOSPADM

## 2021-12-07 RX ADMIN — POLYETHYLENE GLYCOL 3350 17 G: 17 POWDER, FOR SOLUTION ORAL at 18:55

## 2021-12-07 RX ADMIN — AZITHROMYCIN DIHYDRATE 500 MG: 500 INJECTION, POWDER, LYOPHILIZED, FOR SOLUTION INTRAVENOUS at 10:21

## 2021-12-07 RX ADMIN — DEXAMETHASONE SODIUM PHOSPHATE 10 MG: 4 INJECTION, SOLUTION INTRAMUSCULAR; INTRAVENOUS at 18:55

## 2021-12-07 RX ADMIN — POLYETHYLENE GLYCOL 3350 17 G: 17 POWDER, FOR SOLUTION ORAL at 10:21

## 2021-12-07 RX ADMIN — Medication 10 ML: at 06:47

## 2021-12-07 RX ADMIN — FAMOTIDINE 20 MG: 20 TABLET ORAL at 10:21

## 2021-12-07 RX ADMIN — FAMOTIDINE 20 MG: 20 TABLET ORAL at 18:56

## 2021-12-07 RX ADMIN — DOCUSATE SODIUM 100 MG: 100 CAPSULE ORAL at 22:37

## 2021-12-07 RX ADMIN — ENOXAPARIN SODIUM 40 MG: 100 INJECTION SUBCUTANEOUS at 10:21

## 2021-12-07 RX ADMIN — Medication 10 ML: at 18:56

## 2021-12-07 RX ADMIN — Medication 10 ML: at 21:45

## 2021-12-07 NOTE — PROGRESS NOTES
Infectious Disease progress Note        Reason: Acute hypoxic respiratory failure, confirmed COVID-19 pneumonia    Current abx Prior abx    azithromycin since 12/1/2021 Ceftriaxone 12/1-12/6,     Lines:       Assessment :    42-year-old man with no significant past medical history admitted to [facility] on 12/1/2021 with increasing shortness of breath for 2 to 3 days. Symptoms of COVID-19 since 11/22/2021  Positive Covid test 11/26/2021    Sudden worsening hypoxia with oxygen saturation in 70s on presentation  Rapidly worsening oxygenation now on high flow oxygen at 40 L, tachypnea, elevated ferritin- 7000s, crp: 7.8    Clinical presentation consistent with acute hypoxic respiratory failure-present on admission due to severe COVID-19 pneumonia    S/p high dose decadron, tocilizumab on 12/1/21    Mild elevated procalcitonin-could be secondary to severe COVID-19 pneumonia. Will monitor for superimposed bacterial infection    Mild elevated J-trszn-ibatsh due to severe COVID-19 pneumonia. No evidence of pulmonary embolism noted on CTA chest 12/1/2021    Unvaccinated patient-high risk for clinical decompensation. Patient doesn't think he wants to be intubated. But still not ready to sign DNR/DNI    Gradually improving hypoxia. +nt constipation  Increasing d-dimer. R/o evolving DVT    Recommendations:    1.  continue decadron 10mg IV q 24 hour till 12/10/21  2.  d/c  azithromycin after today's dose  3. Monitor CRP, D-dimer, procalcitonin  4. Titrate oxygen as tolerated  5. Prophylactic anticoagulation per primary team  6. continue miralax. Give dulcolax suppository if no BM by am  7. Obtain venous duplex bilateral UE, LE to r/o DVT       Above plan was discussed in details with patient,RN. Please call me if any further questions or concerns. Will continue to participate in the care of this patient. HPI:    Feels better. Patient denies any pleuritic chest pain. C/o constipation.    There are no discharge medications for this patient. Current Facility-Administered Medications   Medication Dose Route Frequency    dexamethasone (DECADRON) 4 mg/mL injection 10 mg  10 mg IntraVENous Q24H    polyethylene glycol (MIRALAX) packet 17 g  17 g Oral BID    sodium chloride (NS) flush 5-40 mL  5-40 mL IntraVENous Q8H    sodium chloride (NS) flush 5-40 mL  5-40 mL IntraVENous PRN    polyethylene glycol (MIRALAX) packet 17 g  17 g Oral DAILY PRN    ondansetron (ZOFRAN ODT) tablet 4 mg  4 mg Oral Q8H PRN    Or    ondansetron (ZOFRAN) injection 4 mg  4 mg IntraVENous Q6H PRN    enoxaparin (LOVENOX) injection 40 mg  40 mg SubCUTAneous DAILY    famotidine (PEPCID) tablet 20 mg  20 mg Oral BID    azithromycin (ZITHROMAX) 500 mg in 0.9% sodium chloride 250 mL (VIAL-MATE)  500 mg IntraVENous Q24H    acetaminophen (TYLENOL) tablet 650 mg  650 mg Oral Q6H PRN    Or    acetaminophen (TYLENOL) suppository 650 mg  650 mg Rectal Q6H PRN    albuterol-ipratropium (DUO-NEB) 2.5 MG-0.5 MG/3 ML  3 mL Nebulization Q4H PRN       Allergies: Codeine    History reviewed. No pertinent family history.   Social History     Socioeconomic History    Marital status:      Spouse name: Not on file    Number of children: Not on file    Years of education: Not on file    Highest education level: Not on file   Occupational History    Not on file   Tobacco Use    Smoking status: Never Smoker    Smokeless tobacco: Never Used   Substance and Sexual Activity    Alcohol use: No    Drug use: Not Currently     Types: Marijuana, Cocaine     Comment: Stopped using when he was 23years old    Sexual activity: Yes   Other Topics Concern    Not on file   Social History Narrative    Not on file     Social Determinants of Health     Financial Resource Strain:     Difficulty of Paying Living Expenses: Not on file   Food Insecurity:     Worried About Running Out of Food in the Last Year: Not on file    Lindsay of Food in the Last Year: Not on file Transportation Needs:     Lack of Transportation (Medical): Not on file    Lack of Transportation (Non-Medical): Not on file   Physical Activity:     Days of Exercise per Week: Not on file    Minutes of Exercise per Session: Not on file   Stress:     Feeling of Stress : Not on file   Social Connections:     Frequency of Communication with Friends and Family: Not on file    Frequency of Social Gatherings with Friends and Family: Not on file    Attends Christianity Services: Not on file    Active Member of [address] [place] [address] [employer] or Organizations: Not on file    Attends Club or Organization Meetings: Not on file    Marital Status: Not on file   Intimate Partner Violence:     Fear of Current or Ex-Partner: Not on file    Emotionally Abused: Not on file    Physically Abused: Not on file    Sexually Abused: Not on file   Housing Stability:     Unable to Pay for Housing in the Last Year: Not on file    Number of [place] in the Last Year: Not on file    Unstable Housing in the Last Year: Not on file     Social History     Tobacco Use   Smoking Status Never Smoker   Smokeless Tobacco Never Used        Temp (24hrs), Av.8 °F (36.6 °C), Min:97.4 °F (36.3 °C), Max:98.4 °F (36.9 °C)    Visit Vitals  /78   Pulse 70   Temp 98.4 °F (36.9 °C)   Resp 16   Ht 5' 9\" (1.753 m)   Wt 76.8 kg (169 lb 4.8 oz)   SpO2 97%   BMI 25.00 kg/m²       ROS: 12 point ROS obtained in details. Pertinent positives as mentioned in HPI,   otherwise negative    Physical Exam:    Vitals signs and nursing note reviewed. Sitting on bed. Appears tachypneic      HENT:      Head: Normocephalic. Eyes:      Conjunctiva/sclera: Conjunctivae normal.      Neck:      Musculoskeletal: Normal range of motion and neck supple. Cardiovascular:      Rate and Rhythm: Normal rate and regular rhythm on monitor  Chest:      Bilateral chest movements equal.  Auscultation deferred due to Covid positive  Abdominal:      General: There is no distension. Palpations: Abdomen is soft. Tenderness: There is no abdominal tenderness. There is no rebound. Musculoskeletal: Normal range of motion. General: No tenderness. Skin:     General: Skin is warm and dry. Findings: No rash. Neurological:      Mental Status: He is alert and oriented to person, place, and time. Cranial Nerves: No cranial nerve deficit. Motor: No abnormal muscle tone. Coordination: Coordination normal.   Psychiatric:         Behavior: Behavior normal.         Thought Content: Thought content normal.         Judgment: Judgment normal.       Labs: Results:   Chemistry Recent Labs     12/07/21 0220 12/06/21  0120 12/05/21  0506   * 128* 130*    139 140   K 5.6* 5.0 4.7    108 108   CO2 25 25 25   BUN 26* 27* 27*   CREA 0.94 0.99 0.92   CA 8.0* 7.8* 8.0*   AGAP 4 6 7   BUCR 28* 27* 29*   AP 91 95 101   TP 5.6* 5.5* 5.7*   ALB 2.4* 2.4* 2.5*   GLOB 3.2 3.1 3.2   AGRAT 0.8 0.8 0.8      CBC w/Diff Recent Labs     12/07/21 0220 12/06/21 0120 12/05/21  0506   WBC 9.5 9.9 8.9   RBC 5.05 4.77 4.76   HGB 14.4 13.3 13.5   HCT 44.0 41.3 41.0    306 318   GRANS 92* 89* 88*   LYMPH 4* 5* 5*   EOS 0 0 0      Microbiology No results for input(s): CULT in the last 72 hours. RADIOLOGY:    All available imaging studies/reports in [facility] for this admission were reviewed    High complexity decision making was performed during the evaluation of this patient at high risk for decompensation with multiple organ involvement         Disclaimer: Sections of this note are dictated utilizing voice recognition software, which may have resulted in some phonetic based errors in grammar and contents. Even though attempts were made to correct all the mistakes, some may have been missed, and remained in the body of the document. If questions arise, please contact our department.     Dr. Marcie Gray, Infectious Disease Specialist  [phone]  December 7, 2021  11:26 AM

## 2021-12-07 NOTE — PROGRESS NOTES
Attempt made to assist patient with prone position. Patient c/o not being able to breath. Was able to tolerate  left side near prone position. FIO2 titrated from 65% to 55%. New settings 40L/55%. No respiratory distress noted at this time.     Surinder Mccann, RRT

## 2021-12-07 NOTE — ROUTINE PROCESS
Received bedside report by [facility] (out going). Report include SBAR,MAR,Kardex. At 2035 pt was resting quietly. Bedside report was given to oncoming nurse Bimal Cadena. Report included SBAR,MAR,Kardex and labs.

## 2021-12-07 NOTE — PROGRESS NOTES
6815-  Bedside and Verbal shift change report given to Katya Ryder RN (oncoming nurse) by Tiny Lesch, RN (offgoing nurse). Report included the following information SBAR, Kardex, Intake/Output, MAR and Recent Results.

## 2021-12-07 NOTE — PROGRESS NOTES
Patient remained left side up and prone positions throughout the night. Titrated FIO2 from 65% to 55%.     Frosty Minerva, RRT

## 2021-12-08 ENCOUNTER — APPOINTMENT (OUTPATIENT)
Dept: VASCULAR SURGERY | Age: 51
DRG: 871 | End: 2021-12-08
Attending: INTERNAL MEDICINE
Payer: COMMERCIAL

## 2021-12-08 LAB
ALBUMIN SERPL-MCNC: 2.6 G/DL (ref 3.4–5)
ALBUMIN/GLOB SERPL: 0.8 {RATIO} (ref 0.8–1.7)
ALP SERPL-CCNC: 81 U/L (ref 45–117)
ALT SERPL-CCNC: 231 U/L (ref 16–61)
ANION GAP SERPL CALC-SCNC: 3 MMOL/L (ref 3–18)
AST SERPL-CCNC: 42 U/L (ref 10–38)
BACTERIA SPEC CULT: NORMAL
BACTERIA SPEC CULT: NORMAL
BASOPHILS # BLD: 0 K/UL (ref 0–0.1)
BASOPHILS NFR BLD: 0 % (ref 0–2)
BILIRUB SERPL-MCNC: 0.7 MG/DL (ref 0.2–1)
BUN SERPL-MCNC: 22 MG/DL (ref 7–18)
BUN/CREAT SERPL: 23 (ref 12–20)
CALCIUM SERPL-MCNC: 8.4 MG/DL (ref 8.5–10.1)
CHLORIDE SERPL-SCNC: 106 MMOL/L (ref 100–111)
CO2 SERPL-SCNC: 27 MMOL/L (ref 21–32)
CREAT SERPL-MCNC: 0.97 MG/DL (ref 0.6–1.3)
CRP SERPL-MCNC: <0.3 MG/DL (ref 0–0.3)
D DIMER PPP FEU-MCNC: 0.95 UG/ML(FEU)
DIFFERENTIAL METHOD BLD: ABNORMAL
EOSINOPHIL # BLD: 0 K/UL (ref 0–0.4)
EOSINOPHIL NFR BLD: 0 % (ref 0–5)
ERYTHROCYTE [DISTWIDTH] IN BLOOD BY AUTOMATED COUNT: 12.3 % (ref 11.6–14.5)
GLOBULIN SER CALC-MCNC: 3.3 G/DL (ref 2–4)
GLUCOSE SERPL-MCNC: 149 MG/DL (ref 74–99)
HCT VFR BLD AUTO: 45 % (ref 36–48)
HGB BLD-MCNC: 14.6 G/DL (ref 13–16)
IMM GRANULOCYTES # BLD AUTO: 0.1 K/UL (ref 0–0.04)
IMM GRANULOCYTES NFR BLD AUTO: 1 % (ref 0–0.5)
LYMPHOCYTES # BLD: 0.3 K/UL (ref 0.9–3.6)
LYMPHOCYTES NFR BLD: 3 % (ref 21–52)
MCH RBC QN AUTO: 27.7 PG (ref 24–34)
MCHC RBC AUTO-ENTMCNC: 32.4 G/DL (ref 31–37)
MCV RBC AUTO: 85.2 FL (ref 78–100)
MONOCYTES # BLD: 0.3 K/UL (ref 0.05–1.2)
MONOCYTES NFR BLD: 3 % (ref 3–10)
NEUTS SEG # BLD: 7.9 K/UL (ref 1.8–8)
NEUTS SEG NFR BLD: 92 % (ref 40–73)
NRBC # BLD: 0 K/UL (ref 0–0.01)
NRBC BLD-RTO: 0 PER 100 WBC
PLATELET # BLD AUTO: 353 K/UL (ref 135–420)
PMV BLD AUTO: 10.3 FL (ref 9.2–11.8)
POTASSIUM SERPL-SCNC: 4.8 MMOL/L (ref 3.5–5.5)
PROCALCITONIN SERPL-MCNC: <0.05 NG/ML
PROT SERPL-MCNC: 5.9 G/DL (ref 6.4–8.2)
RBC # BLD AUTO: 5.28 M/UL (ref 4.35–5.65)
SERVICE CMNT-IMP: NORMAL
SERVICE CMNT-IMP: NORMAL
SODIUM SERPL-SCNC: 136 MMOL/L (ref 136–145)
WBC # BLD AUTO: 8.5 K/UL (ref 4.6–13.2)

## 2021-12-08 PROCEDURE — 74011250636 HC RX REV CODE- 250/636: Performed by: INTERNAL MEDICINE

## 2021-12-08 PROCEDURE — 93970 EXTREMITY STUDY: CPT

## 2021-12-08 PROCEDURE — 74011250637 HC RX REV CODE- 250/637: Performed by: INTERNAL MEDICINE

## 2021-12-08 PROCEDURE — 94762 N-INVAS EAR/PLS OXIMTRY CONT: CPT

## 2021-12-08 PROCEDURE — 77010033678 HC OXYGEN DAILY

## 2021-12-08 PROCEDURE — 99232 SBSQ HOSP IP/OBS MODERATE 35: CPT | Performed by: FAMILY MEDICINE

## 2021-12-08 PROCEDURE — 86140 C-REACTIVE PROTEIN: CPT

## 2021-12-08 PROCEDURE — 77010033711 HC HIGH FLOW OXYGEN

## 2021-12-08 PROCEDURE — 85025 COMPLETE CBC W/AUTO DIFF WBC: CPT

## 2021-12-08 PROCEDURE — 84145 PROCALCITONIN (PCT): CPT

## 2021-12-08 PROCEDURE — 65660000000 HC RM CCU STEPDOWN

## 2021-12-08 PROCEDURE — 80053 COMPREHEN METABOLIC PANEL: CPT

## 2021-12-08 PROCEDURE — 36415 COLL VENOUS BLD VENIPUNCTURE: CPT

## 2021-12-08 PROCEDURE — 94761 N-INVAS EAR/PLS OXIMETRY MLT: CPT

## 2021-12-08 PROCEDURE — 85379 FIBRIN DEGRADATION QUANT: CPT

## 2021-12-08 RX ADMIN — FAMOTIDINE 20 MG: 20 TABLET ORAL at 10:22

## 2021-12-08 RX ADMIN — POLYETHYLENE GLYCOL 3350 17 G: 17 POWDER, FOR SOLUTION ORAL at 17:07

## 2021-12-08 RX ADMIN — Medication 10 ML: at 06:10

## 2021-12-08 RX ADMIN — ENOXAPARIN SODIUM 40 MG: 100 INJECTION SUBCUTANEOUS at 10:20

## 2021-12-08 RX ADMIN — FAMOTIDINE 20 MG: 20 TABLET ORAL at 17:07

## 2021-12-08 RX ADMIN — POLYETHYLENE GLYCOL 3350 17 G: 17 POWDER, FOR SOLUTION ORAL at 10:20

## 2021-12-08 RX ADMIN — Medication 10 ML: at 17:07

## 2021-12-08 RX ADMIN — BISACODYL 10 MG: 10 SUPPOSITORY RECTAL at 10:22

## 2021-12-08 RX ADMIN — DEXAMETHASONE SODIUM PHOSPHATE 10 MG: 4 INJECTION, SOLUTION INTRAMUSCULAR; INTRAVENOUS at 19:50

## 2021-12-08 RX ADMIN — Medication 10 ML: at 21:19

## 2021-12-08 NOTE — PROGRESS NOTES
Comprehensive Nutrition Assessment    Type and Reason for Visit: Initial, RD nutrition re-screen/LOS    Nutrition Recommendations/Plan:   - Add oral nutrition supplements: Ensure Enlive TID. Nutrition Assessment:  Patient reports improvement in appetite over the past week since admission with fair intake of recent meals and receiving bowel regimen (colace daily, miralax BID) and given enema today for constipation with improvement in discomfort with success of suppository    Malnutrition Assessment:  Malnutrition Status: At risk for malnutrition (specify) (weight loss and inadequate intake x week PTA, COVID-19)      Nutrition History and Allergies: Past medical history of nephrolithiasis admitted for acute hypoxic respiratory failure secondary to severe Covid19 pneumonia. Patient reports decreased appetite and poor po intake x week PTA, consuming less than 1-2 small meals per day or not eating with shortness of breath. Patient reports usual weight of 175-180 lb with stable weight history prior to admission. Weight history per chart review: 181 lb (2/15/18), 171 lb (2/21/18), 169 lb (12/7/21). NKFA. Estimated Daily Nutrient Needs:  Energy (kcal): 5177-7860; Weight Used for Energy Requirements: Current (77 kg)  Protein (g): 62-92; Weight Used for Protein Requirements: Current (0.8-1.2)  Fluid (ml/day): 4386-4632; Method Used for Fluid Requirements: 1 ml/kcal    Nutrition Related Findings:  Last BM 12/8 after enema given (last BM on 12/3 prior to suppository today). Pertinent Medications: bowel regimen, steroid      Wounds:  None       Current Nutrition Therapies:  ADULT DIET Regular    Anthropometric Measures:  · Height:  5' 9\" (175.3 cm)  · Current Body Wt:  79.8 kg (175 lb 14.8 oz)   · Admission Body Wt:  179 lb 14.3 oz    · Usual Body Wt:  81.6 kg (180 lb)     · Ideal Body Wt:  160 lbs:  110 %  · BMI Category: Overweight (BMI 25.0-29. 9)       Nutrition Diagnosis:   · Unintended weight loss related to catabolic illness, impaired respiratory function, increased demand for energy/nutrients, early satiety as evidenced by poor intake prior to admission, weight loss (11 lb, 6% weight loss from usual weight, SOB with poor intake x week PTA)      Nutrition Interventions:   Food and/or Nutrient Delivery: Continue current diet, Start oral nutrition supplement  Nutrition Education and Counseling: No recommendations at this time, Education not indicated  Coordination of Nutrition Care: Continue to monitor while inpatient    Goals:  PO nutrition intake will meet >75% of patient estimated nutritional needs within the next 7 days       Nutrition Monitoring and Evaluation:   Behavioral-Environmental Outcomes: None identified  Food/Nutrient Intake Outcomes: Food and nutrient intake, Supplement intake  Physical Signs/Symptoms Outcomes: Biochemical data, GI status, Constipation, Meal time behavior    Discharge Planning:    Continue current diet, Continue oral nutrition supplement     Electronically signed by Bakari Rice RD, [address]  on 12/8/2021 at 3:43 PM    Contact: [phone]

## 2021-12-08 NOTE — PROGRESS NOTES
Progress Note         Patient: Armen Muir MRN: [id number]  CSN: [id number]    YOB: 1970  Age: 45 y.o. Sex: male    DOA: 12/1/2021 LOS:  LOS: 7 days                    Subjective:     Armen Muir is a 45 y.o. male with a PMHx of nephrolithiasis who is admitted for acute hypoxic respiratory failure secondary to severe Covid19 pneumonia. Seen earlier in room  Lying in bed, NAD  Reports mild improvement in shortness of breath  Has significant KYLE with any exertion  Also productive cough  Appetite is good  Constipation resolved    His wife is also in the hospital with Covid    Hypoxia improved  SPO2 in the low to mid 90s on 30 L/min, 40% O2      Objective:     Physical Exam:  Visit Vitals  /85 (BP Patient Position: At rest)   Pulse 68   Temp 97.6 °F (36.4 °C)   Resp 16   Ht 5' 9\" (1.753 m)   Wt 76.8 kg (169 lb 4.8 oz)   SpO2 95%   BMI 25.00 kg/m²        General:         Alert, cooperative, no acute distress    HEENT: NC, Atraumatic. Anicteric sclerae. Lungs: CTA Bilaterally. No Wheezing/Rhonchi/Rales. Heart:  Regular  rhythm,  No murmur, No Rubs, No Gallops  Abdomen: Soft, Non distended, Non tender. +Bowel sounds, no HSM  Extremities: No c/c/e  Psych:   Not anxious or agitated. Neurologic:  Alert and oriented X 3. No acute neurological deficits. Intake and Output:  Current Shift:  No intake/output data recorded.   Last three shifts:  12/06 1901 - 12/08 0700  In: -   Out: 300 [Urine:300]    Labs: Results:       Chemistry Recent Labs     12/08/21  0122 12/07/21  0220 12/06/21  0120   * 127* 128*    136 139   K 4.8 5.6* 5.0    107 108   CO2 27 25 25   BUN 22* 26* 27*   CREA 0.97 0.94 0.99   CA 8.4* 8.0* 7.8*   AGAP 3 4 6   BUCR 23* 28* 27*   AP 81 91 95   TP 5.9* 5.6* 5.5*   ALB 2.6* 2.4* 2.4*   GLOB 3.3 3.2 3.1   AGRAT 0.8 0.8 0.8      CBC w/Diff Recent Labs     12/08/21  0122 12/07/21  0220 12/06/21  0120   WBC 8.5 9.5 9.9   RBC 5.28 5.05 4.77 HGB 14.6 14.4 13.3   HCT 45.0 44.0 41.3    233 306   GRANS 92* 92* 89*   LYMPH 3* 4* 5*   EOS 0 0 0      Cardiac Enzymes No results for input(s): CPK, CKND1, SABINA in the last 72 hours. No lab exists for component: CKRMB, TROIP   Coagulation No results for input(s): PTP, INR, APTT, INREXT, INREXT in the last 72 hours. Lipid Panel No results found for: CHOL, CHOLPOCT, CHOLX, CHLST, CHOLV, 788216, HDL, HDLP, LDL, LDLC, DLDLP, 005468, VLDLC, VLDL, TGLX, TRIGL, TRIGP, TGLPOCT, CHHD, CHHDX   BNP No results for input(s): BNPP in the last 72 hours. Liver Enzymes Recent Labs     12/08/21  0122   TP 5.9*   ALB 2.6*   AP 81      Thyroid Studies No results found for: T4, T3U, TSH, TSHEXT, TSHEXT             Assessment and Plan:     Starla Rodney is a 45 y.o. male with a PMHx of nephrolithiasis who is admitted for acute hypoxic respiratory failure secondary to severe Covid19 pneumonia. 1. Acute hypoxic respiratory failure  2. Severe Covid19 pneumonia  3. Constipationresolved    ID following  Continue O2 as needed and wean as tolerated  Continue IV steroidsdexamethasone 10 mg IV daily until 12/10  Follow-up sputum culture  Follow-up inflammatory markers  Follow-up CBC, CMP  Incentive spirometry  Droplet plus precautions      Case discussed with:  [x]Patient  []Family  [x]Nursing  [x]Case Management  DVT prophylaxis: Lovenox  Diet: Regular  Contact: Sada Matt (wife)      [phone]  Code Status: Full  Disposition: Continue current care, greater than 2 nights      H. Cony Boyer DO  12/8/2021       Dragon medical dictation software was used for portions of this report. Unintended errors may occur.

## 2021-12-08 NOTE — PROGRESS NOTES
Infectious Disease progress Note        Reason: Acute hypoxic respiratory failure, confirmed COVID-19 pneumonia    Current abx Prior abx    azithromycin since 12/1/2021 Ceftriaxone 12/1-12/6,     Lines:       Assessment :    59-year-old man with no significant past medical history admitted to [facility] on 12/1/2021 with increasing shortness of breath for 2 to 3 days. Symptoms of COVID-19 since 11/22/2021  Positive Covid test 11/26/2021    Sudden worsening hypoxia with oxygen saturation in 70s on presentation  Rapidly worsening oxygenation now on high flow oxygen at 40 L, tachypnea, elevated ferritin- 7000s, crp: 7.8    Clinical presentation consistent with acute hypoxic respiratory failure-present on admission due to severe COVID-19 pneumonia    S/p high dose decadron, tocilizumab on 12/1/21    Mild elevated procalcitonin-could be secondary to severe COVID-19 pneumonia. Will monitor for superimposed bacterial infection    Mild elevated V-tsgue-olyjzg due to severe COVID-19 pneumonia. No evidence of pulmonary embolism noted on CTA chest 12/1/2021. No evidence of DVT noted on venous duplex bilateral extremity 12/8/2021      Gradually improving hypoxia. Cough with yellowish sputum. R/o superimposed bacterial pneumonia. Sputum culture 12/7-normal respiratory kinsey  Resolved constipation      Recommendations:    1.  continue decadron 10mg IV q 24 hour till 12/10/21  2. F/u sputum cx  3. Monitor CRP, D-dimer, procalcitonin  4. Titrate oxygen as tolerated  5. Prophylactic anticoagulation per primary team  6. Avoid constipation         Above plan was discussed in details with patient,RN. Please call me if any further questions or concerns. Will continue to participate in the care of this patient. HPI:    Feels better. Patient denies any pleuritic chest pain. Resolved constipation. There are no discharge medications for this patient.       Current Facility-Administered Medications   Medication Dose Route Frequency  bisacodyL (DULCOLAX) suppository 10 mg  10 mg Rectal ONCE    docusate sodium (COLACE) capsule 100 mg  100 mg Oral DAILY    dexamethasone (DECADRON) 4 mg/mL injection 10 mg  10 mg IntraVENous Q24H    polyethylene glycol (MIRALAX) packet 17 g  17 g Oral BID    sodium chloride (NS) flush 5-40 mL  5-40 mL IntraVENous Q8H    sodium chloride (NS) flush 5-40 mL  5-40 mL IntraVENous PRN    polyethylene glycol (MIRALAX) packet 17 g  17 g Oral DAILY PRN    ondansetron (ZOFRAN ODT) tablet 4 mg  4 mg Oral Q8H PRN    Or    ondansetron (ZOFRAN) injection 4 mg  4 mg IntraVENous Q6H PRN    enoxaparin (LOVENOX) injection 40 mg  40 mg SubCUTAneous DAILY    famotidine (PEPCID) tablet 20 mg  20 mg Oral BID    acetaminophen (TYLENOL) tablet 650 mg  650 mg Oral Q6H PRN    Or    acetaminophen (TYLENOL) suppository 650 mg  650 mg Rectal Q6H PRN    albuterol-ipratropium (DUO-NEB) 2.5 MG-0.5 MG/3 ML  3 mL Nebulization Q4H PRN       Allergies: Codeine    History reviewed. No pertinent family history. Social History     Socioeconomic History    Marital status:      Spouse name: Not on file    Number of children: Not on file    Years of education: Not on file    Highest education level: Not on file   Occupational History    Not on file   Tobacco Use    Smoking status: Never Smoker    Smokeless tobacco: Never Used   Substance and Sexual Activity    Alcohol use: No    Drug use: Not Currently     Types: Marijuana, Cocaine     Comment: Stopped using when he was 23years old    Sexual activity: Yes   Other Topics Concern    Not on file   Social History Narrative    Not on file     Social Determinants of Health     Financial Resource Strain:     Difficulty of Paying Living Expenses: Not on file   Food Insecurity:     Worried About Running Out of Food in the Last Year: Not on file    Lindsay of Food in the Last Year: Not on file   Transportation Needs:     Lack of Transportation (Medical):  Not on file    Lack of Transportation (Non-Medical): Not on file   Physical Activity:     Days of Exercise per Week: Not on file    Minutes of Exercise per Session: Not on file   Stress:     Feeling of Stress : Not on file   Social Connections:     Frequency of Communication with Friends and Family: Not on file    Frequency of Social Gatherings with Friends and Family: Not on file    Attends [employer]ism Services: Not on file    Active Member of [address] [place] [address] or Organizations: Not on file    Attends Club or Organization Meetings: Not on file    Marital Status: Not on file   Intimate Partner Violence:     Fear of Current or Ex-Partner: Not on file    Emotionally Abused: Not on file    Physically Abused: Not on file    Sexually Abused: Not on file   Housing Stability:     Unable to Pay for Housing in the Last Year: Not on file    Number of [place] in the Last Year: Not on file    Unstable Housing in the Last Year: Not on file     Social History     Tobacco Use   Smoking Status Never Smoker   Smokeless Tobacco Never Used        Temp (24hrs), Av.1 °F (36.7 °C), Min:97.6 °F (36.4 °C), Max:98.4 °F (36.9 °C)    Visit Vitals  /77 (BP Patient Position: At rest)   Pulse (!) 49   Temp 97.6 °F (36.4 °C)   Resp 17   Ht 5' 9\" (1.753 m)   Wt 76.8 kg (169 lb 4.8 oz)   SpO2 95%   BMI 25.00 kg/m²       ROS: 12 point ROS obtained in details. Pertinent positives as mentioned in HPI,   otherwise negative    Physical Exam:    Vitals signs and nursing note reviewed. Sitting on bed. Appears tachypneic      HENT:      Head: Normocephalic. Eyes:      Conjunctiva/sclera: Conjunctivae normal.      Neck:      Musculoskeletal: Normal range of motion and neck supple. Cardiovascular:      Rate and Rhythm: Normal rate and regular rhythm on monitor  Chest:      Bilateral chest movements equal.  Auscultation deferred due to Covid positive  Abdominal:      General: There is no distension. Palpations: Abdomen is soft. Tenderness:  There is no abdominal tenderness. There is no rebound. Musculoskeletal: Normal range of motion. General: No tenderness. Skin:     General: Skin is warm and dry. Findings: No rash. Neurological:      Mental Status: He is alert and oriented to person, place, and time. Cranial Nerves: No cranial nerve deficit. Motor: No abnormal muscle tone. Coordination: Coordination normal.   Psychiatric:         Behavior: Behavior normal.         Thought Content: Thought content normal.         Judgment: Judgment normal.       Labs: Results:   Chemistry Recent Labs     12/08/21  0122 12/07/21 0220 12/06/21  0120   * 127* 128*    136 139   K 4.8 5.6* 5.0    107 108   CO2 27 25 25   BUN 22* 26* 27*   CREA 0.97 0.94 0.99   CA 8.4* 8.0* 7.8*   AGAP 3 4 6   BUCR 23* 28* 27*   AP 81 91 95   TP 5.9* 5.6* 5.5*   ALB 2.6* 2.4* 2.4*   GLOB 3.3 3.2 3.1   AGRAT 0.8 0.8 0.8      CBC w/Diff Recent Labs     12/08/21  0122 12/07/21 0220 12/06/21  0120   WBC 8.5 9.5 9.9   RBC 5.28 5.05 4.77   HGB 14.6 14.4 13.3   HCT 45.0 44.0 41.3    233 306   GRANS 92* 92* 89*   LYMPH 3* 4* 5*   EOS 0 0 0      Microbiology Recent Labs     12/07/21  1350   CULT PENDING          RADIOLOGY:    All available imaging studies/reports in [facility] for this admission were reviewed    High complexity decision making was performed during the evaluation of this patient at high risk for decompensation with multiple organ involvement         Disclaimer: Sections of this note are dictated utilizing voice recognition software, which may have resulted in some phonetic based errors in grammar and contents. Even though attempts were made to correct all the mistakes, some may have been missed, and remained in the body of the document. If questions arise, please contact our department.     Dr. Adamaris Menchaca, Infectious Disease Specialist  [phone]  December 8, 2021  11:26 AM

## 2021-12-08 NOTE — PROGRESS NOTES
Progress Note         Patient: Karis Liu MRN: [id number]  CSN: [id number]    YOB: 1970  Age: 45 y.o. Sex: male    DOA: 12/1/2021 LOS:  LOS: 6 days                    Subjective:     Karis Liu is a 45 y.o. male with a PMHx of nephrolithiasis who is admitted for acute hypoxic respiratory failure secondary to severe Covid19 pneumonia. Seen earlier in room  Sitting up in bed, NAD  Reports improvement in shortness of breath since admission  Has significant KYLE with any exertion  Appetite is good  Having some constipation    Tells me his wife is also in the hospital with Covid    SPO2 in the low to mid 90s on 40 L/min, 45% O2      Objective:     Physical Exam:  Visit Vitals  /82   Pulse 75   Temp 97.9 °F (36.6 °C)   Resp 16   Ht 5' 9\" (1.753 m)   Wt 76.8 kg (169 lb 4.8 oz)   SpO2 95%   BMI 25.00 kg/m²        General:         Alert, cooperative, no acute distress    HEENT: NC, Atraumatic. Anicteric sclerae. Lungs: CTA Bilaterally. No Wheezing/Rhonchi/Rales. Heart:  Regular  rhythm,  No murmur, No Rubs, No Gallops  Abdomen: Soft, Non distended, Non tender. +Bowel sounds, no HSM  Extremities: No c/c/e  Psych:   Not anxious or agitated. Neurologic:  Alert and oriented X 3. No acute neurological deficits. Intake and Output:  Current Shift:  No intake/output data recorded.   Last three shifts:  12/06 0701 - 12/07 1900  In: -   Out: 500 [Urine:500]    Labs: Results:       Chemistry Recent Labs     12/07/21  0220 12/06/21  0120 12/05/21  0506   * 128* 130*    139 140   K 5.6* 5.0 4.7    108 108   CO2 25 25 25   BUN 26* 27* 27*   CREA 0.94 0.99 0.92   CA 8.0* 7.8* 8.0*   AGAP 4 6 7   BUCR 28* 27* 29*   AP 91 95 101   TP 5.6* 5.5* 5.7*   ALB 2.4* 2.4* 2.5*   GLOB 3.2 3.1 3.2   AGRAT 0.8 0.8 0.8      CBC w/Diff Recent Labs     12/07/21  0220 12/06/21  0120 12/05/21  0506   WBC 9.5 9.9 8.9   RBC 5.05 4.77 4.76   HGB 14.4 13.3 13.5   HCT 44.0 41.3 41.0  306 318   GRANS 92* 89* 88*   LYMPH 4* 5* 5*   EOS 0 0 0      Cardiac Enzymes No results for input(s): CPK, CKND1, SABINA in the last 72 hours. No lab exists for component: CKRMB, TROIP   Coagulation No results for input(s): PTP, INR, APTT, INREXT in the last 72 hours. Lipid Panel No results found for: CHOL, CHOLPOCT, CHOLX, CHLST, CHOLV, 371546, HDL, HDLP, LDL, LDLC, DLDLP, 312911, VLDLC, VLDL, TGLX, TRIGL, TRIGP, TGLPOCT, CHHD, CHHDX   BNP No results for input(s): BNPP in the last 72 hours. Liver Enzymes Recent Labs     12/07/21  0220   TP 5.6*   ALB 2.4*   AP 91      Thyroid Studies No results found for: T4, T3U, TSH, TSHEXT             Assessment and Plan:     Helio Cedeño is a 45 y.o. male with a PMHx of nephrolithiasis who is admitted for acute hypoxic respiratory failure secondary to severe Covid19 pneumonia. 1. Acute hypoxic respiratory failure  2. Severe Covid19 pneumonia  3. Constipation    ID following  Follow-up BUE and BLE PVLs  Continue O2 as needed and wean as tolerated  Continue IV steroidsdexamethasone 10 mg IV daily until 12/10  Discontinue ABX after today's dose  Continue MiraLAX, add Colace  Suppository in a.m. if no BM  Follow-up inflammatory markers  Follow-up CBC, CMP  Incentive spirometry  Droplet plus precautions      Case discussed with:  [x]Patient  []Family  [x]Nursing  [x]Case Management  DVT prophylaxis: Lovenox  Diet: Regular  Contact: Shemar Bustillo (wife)      [phone]  Code Status: Full  Disposition: Continue current care, greater than 2 nights      H. Kacy Nnuez DO  12/7/2021       Dragon medical dictation software was used for portions of this report. Unintended errors may occur.

## 2021-12-09 LAB
ALBUMIN SERPL-MCNC: 2.6 G/DL (ref 3.4–5)
ALBUMIN/GLOB SERPL: 0.8 {RATIO} (ref 0.8–1.7)
ALP SERPL-CCNC: 84 U/L (ref 45–117)
ALT SERPL-CCNC: 185 U/L (ref 16–61)
ANION GAP SERPL CALC-SCNC: 4 MMOL/L (ref 3–18)
AST SERPL-CCNC: 34 U/L (ref 10–38)
BASOPHILS # BLD: 0 K/UL (ref 0–0.1)
BASOPHILS NFR BLD: 0 % (ref 0–2)
BILIRUB SERPL-MCNC: 0.7 MG/DL (ref 0.2–1)
BUN SERPL-MCNC: 20 MG/DL (ref 7–18)
BUN/CREAT SERPL: 19 (ref 12–20)
CALCIUM SERPL-MCNC: 8.5 MG/DL (ref 8.5–10.1)
CHLORIDE SERPL-SCNC: 104 MMOL/L (ref 100–111)
CO2 SERPL-SCNC: 29 MMOL/L (ref 21–32)
CREAT SERPL-MCNC: 1.06 MG/DL (ref 0.6–1.3)
CRP SERPL-MCNC: <0.3 MG/DL (ref 0–0.3)
D DIMER PPP FEU-MCNC: 0.85 UG/ML(FEU)
DIFFERENTIAL METHOD BLD: ABNORMAL
EOSINOPHIL # BLD: 0 K/UL (ref 0–0.4)
EOSINOPHIL NFR BLD: 0 % (ref 0–5)
ERYTHROCYTE [DISTWIDTH] IN BLOOD BY AUTOMATED COUNT: 12.6 % (ref 11.6–14.5)
GLOBULIN SER CALC-MCNC: 3.2 G/DL (ref 2–4)
GLUCOSE SERPL-MCNC: 126 MG/DL (ref 74–99)
HCT VFR BLD AUTO: 46.6 % (ref 36–48)
HGB BLD-MCNC: 15.1 G/DL (ref 13–16)
IMM GRANULOCYTES # BLD AUTO: 0.1 K/UL (ref 0–0.04)
IMM GRANULOCYTES NFR BLD AUTO: 1 % (ref 0–0.5)
LYMPHOCYTES # BLD: 0.4 K/UL (ref 0.9–3.6)
LYMPHOCYTES NFR BLD: 5 % (ref 21–52)
MCH RBC QN AUTO: 27.9 PG (ref 24–34)
MCHC RBC AUTO-ENTMCNC: 32.4 G/DL (ref 31–37)
MCV RBC AUTO: 86.1 FL (ref 78–100)
MONOCYTES # BLD: 0.4 K/UL (ref 0.05–1.2)
MONOCYTES NFR BLD: 5 % (ref 3–10)
NEUTS SEG # BLD: 6.9 K/UL (ref 1.8–8)
NEUTS SEG NFR BLD: 89 % (ref 40–73)
NRBC # BLD: 0 K/UL (ref 0–0.01)
NRBC BLD-RTO: 0 PER 100 WBC
PLATELET # BLD AUTO: 373 K/UL (ref 135–420)
PMV BLD AUTO: 10.4 FL (ref 9.2–11.8)
POTASSIUM SERPL-SCNC: 5.8 MMOL/L (ref 3.5–5.5)
PROCALCITONIN SERPL-MCNC: <0.05 NG/ML
PROT SERPL-MCNC: 5.8 G/DL (ref 6.4–8.2)
RBC # BLD AUTO: 5.41 M/UL (ref 4.35–5.65)
SODIUM SERPL-SCNC: 137 MMOL/L (ref 136–145)
WBC # BLD AUTO: 7.8 K/UL (ref 4.6–13.2)

## 2021-12-09 PROCEDURE — 36415 COLL VENOUS BLD VENIPUNCTURE: CPT

## 2021-12-09 PROCEDURE — 86140 C-REACTIVE PROTEIN: CPT

## 2021-12-09 PROCEDURE — 74011250636 HC RX REV CODE- 250/636: Performed by: INTERNAL MEDICINE

## 2021-12-09 PROCEDURE — 74011250637 HC RX REV CODE- 250/637: Performed by: FAMILY MEDICINE

## 2021-12-09 PROCEDURE — 99232 SBSQ HOSP IP/OBS MODERATE 35: CPT | Performed by: FAMILY MEDICINE

## 2021-12-09 PROCEDURE — 80053 COMPREHEN METABOLIC PANEL: CPT

## 2021-12-09 PROCEDURE — 65660000000 HC RM CCU STEPDOWN

## 2021-12-09 PROCEDURE — 74011250637 HC RX REV CODE- 250/637: Performed by: INTERNAL MEDICINE

## 2021-12-09 PROCEDURE — 85025 COMPLETE CBC W/AUTO DIFF WBC: CPT

## 2021-12-09 PROCEDURE — 84145 PROCALCITONIN (PCT): CPT

## 2021-12-09 PROCEDURE — 85379 FIBRIN DEGRADATION QUANT: CPT

## 2021-12-09 RX ADMIN — Medication 10 ML: at 21:05

## 2021-12-09 RX ADMIN — Medication 10 ML: at 18:40

## 2021-12-09 RX ADMIN — DEXAMETHASONE SODIUM PHOSPHATE 10 MG: 4 INJECTION, SOLUTION INTRAMUSCULAR; INTRAVENOUS at 18:40

## 2021-12-09 RX ADMIN — Medication 10 ML: at 05:41

## 2021-12-09 RX ADMIN — FAMOTIDINE 20 MG: 20 TABLET ORAL at 09:32

## 2021-12-09 RX ADMIN — FAMOTIDINE 20 MG: 20 TABLET ORAL at 17:14

## 2021-12-09 RX ADMIN — ENOXAPARIN SODIUM 40 MG: 100 INJECTION SUBCUTANEOUS at 09:32

## 2021-12-09 RX ADMIN — DOCUSATE SODIUM 100 MG: 100 CAPSULE ORAL at 09:32

## 2021-12-09 NOTE — PROGRESS NOTES
Progress Note         Patient: Jory Dale MRN: [id number]  CSN: [id number]    YOB: 1970  Age: 45 y.o. Sex: male    DOA: 12/1/2021 LOS:  LOS: 8 days                    Subjective:     Jory Dale is a 45 y.o. male with a PMHx of nephrolithiasis who is admitted for acute hypoxic respiratory failure secondary to severe Covid19 pneumonia. Seen earlier in room  Sitting up in bed, NAD  Reports mild improvement in shortness of breath  Has significant KYLE with any exertion  Cough improved  Appetite is good    His wife is also in the hospital with Covid    Hypoxia improved  SPO2 in the mid 90s on 30 L/min, 40% O2      Objective:     Physical Exam:  Visit Vitals  /75 (BP 1 Location: Left upper arm, BP Patient Position: Sitting; At rest)   Pulse 95   Temp 98 °F (36.7 °C)   Resp 20   Ht 5' 9\" (1.753 m)   Wt 77.2 kg (170 lb 3.2 oz)   SpO2 98%   BMI 25.13 kg/m²        General:         Alert, cooperative, no acute distress    HEENT: NC, Atraumatic. Anicteric sclerae. Lungs: CTA Bilaterally. No Wheezing/Rhonchi/Rales. Heart:  Regular  rhythm,  No murmur, No Rubs, No Gallops  Abdomen: Soft, Non distended, Non tender. +Bowel sounds, no HSM  Extremities: No c/c/e  Psych:   Not anxious or agitated. Neurologic:  Alert and oriented X 3. No acute neurological deficits. Intake and Output:  Current Shift:  No intake/output data recorded.   Last three shifts:  12/07 1901 - 12/09 0700  In: -   Out: 300 [Urine:300]    Labs: Results:       Chemistry Recent Labs     12/09/21  0423 12/08/21  0122 12/07/21  0220   * 149* 127*    136 136   K 5.8* 4.8 5.6*    106 107   CO2 29 27 25   BUN 20* 22* 26*   CREA 1.06 0.97 0.94   CA 8.5 8.4* 8.0*   AGAP 4 3 4   BUCR 19 23* 28*   AP 84 81 91   TP 5.8* 5.9* 5.6*   ALB 2.6* 2.6* 2.4*   GLOB 3.2 3.3 3.2   AGRAT 0.8 0.8 0.8      CBC w/Diff Recent Labs     12/09/21  0423 12/08/21  0122 12/07/21  0220   WBC 7.8 8.5 9.5   RBC 5.41 5.28 5.05   HGB 15.1 14.6 14.4   HCT 46.6 45.0 44.0    353 233   GRANS 89* 92* 92*   LYMPH 5* 3* 4*   EOS 0 0 0      Cardiac Enzymes No results for input(s): CPK, CKND1, SABINA in the last 72 hours. No lab exists for component: CKRMB, TROIP   Coagulation No results for input(s): PTP, INR, APTT, INREXT, INREXT in the last 72 hours. Lipid Panel No results found for: CHOL, CHOLPOCT, CHOLX, CHLST, CHOLV, 971341, HDL, HDLP, LDL, LDLC, DLDLP, 400480, VLDLC, VLDL, TGLX, TRIGL, TRIGP, TGLPOCT, CHHD, CHHDX   BNP No results for input(s): BNPP in the last 72 hours. Liver Enzymes Recent Labs     12/09/21  0423   TP 5.8*   ALB 2.6*   AP 84      Thyroid Studies No results found for: T4, T3U, TSH, TSHEXT, TSHEXT             Assessment and Plan:     Helio Cedeño is a 45 y.o. male with a PMHx of nephrolithiasis who is admitted for acute hypoxic respiratory failure secondary to severe Covid19 pneumonia. 1. Acute hypoxic respiratory failure  2. Severe Covid19 pneumonia  3. Constipationresolved    ID following  BUE and BLE PVLs showed no DVT  Continue O2 as needed and wean as tolerated  Continue IV steroidsdexamethasone 10 mg IV daily until 12/10  Follow-up S&S of Pseudomonas in sputum culture  Follow-up inflammatory markers  Follow-up CBC, CMP  Incentive spirometry  Droplet plus precautions      Case discussed with:  [x]Patient  []Family  [x]Nursing  [x]Case Management  DVT prophylaxis: Lovenox  Diet: Regular  Contact: Shemar Bustillo (wife)      [phone]  Code Status: Full  Disposition: Continue current care, greater than 2 nights      H. Kacy Nunez DO  12/9/2021       Dragon medical dictation software was used for portions of this report. Unintended errors may occur.

## 2021-12-09 NOTE — PROGRESS NOTES
1925 Bedside and Verbal shift change  Received from Erika Abbott RN (outgoing nurse), to CANDACE Aaron (oncoming)  Pt. Is AOX 4. IV SL, Pt. denies  pain at this time. Report included the following information SBAR, Kardex, Procedure Summary, Intake/Output, MAR, Recent Lab Results, and  Cardiac Rhythm @ SR. Will resume care and monitor Pt. Condition. Pt. Educated on call bell when in need of help and assistance. Pt. verbalized understanding. 2000 Pt. Head to toe Assessment Done and documented. 2100  Pt. Not in distress. 2200  Pt. Resting comfortably in bed. 0000  Pt. Denies discomfort. 0200  Pt. Resting with eyes closed, easily awaken. 0330  Pt. Denies discomfort. 0500  PT. Able to rest and sleep well throughout the shift.    0700  Pt. Denies discomfort. Verbal and bedside Shift changed report given to Erika Abbott RN (oncoming RN) on Pt. Condition. Report consisted of patients Situation, History, Activities, intake/output,  Background, Assessment and Recommendations(SBAR). Information from the following report(s) Kardex, order Summary, Lab results and MAR was reviewed with the receiving nurse. Opportunity for questions and clarification was provided.

## 2021-12-09 NOTE — PROGRESS NOTES
Infectious Disease progress Note        Reason: Acute hypoxic respiratory failure, confirmed COVID-19 pneumonia    Current abx Prior abx     Ceftriaxone 12/1-12/6  azithromycin since 12/1/2021-12/7/21     Lines:       Assessment :    59-year-old man with no significant past medical history admitted to [facility] on 12/1/2021 with increasing shortness of breath for 2 to 3 days. Symptoms of COVID-19 since 11/22/2021  Positive Covid test 11/26/2021    Sudden worsening hypoxia with oxygen saturation in 70s on presentation  Rapidly worsening oxygenation now on high flow oxygen at 40 L, tachypnea, elevated ferritin- 7000s, crp: 7.8    Clinical presentation consistent with acute hypoxic respiratory failure-present on admission due to severe COVID-19 pneumonia    S/p high dose decadron, tocilizumab on 12/1/21    Mild elevated procalcitonin-could be secondary to severe COVID-19 pneumonia. Will monitor for superimposed bacterial infection    Mild elevated X-inqxh-cwkeco due to severe COVID-19 pneumonia. No evidence of pulmonary embolism noted on CTA chest 12/1/2021. No evidence of DVT noted on venous duplex bilateral extremity 12/8/2021      Gradually improving hypoxia. Cough with yellowish sputum. R/o superimposed bacterial pneumonia. Sputum culture 12/7-normal respiratory kinsey, pseudomonas    Currently patient is improving clinically off antibiotics. Low procalcitonin. Improved cough. Pseudomonas in sputum culture could be colonizer. Hence we will continue monitoring off antibiotics        Recommendations:    1. Discontinue Decadron after tomorrow's dose  2. F/u ID and susceptibility of Pseudomonas in sputum cx-Will initiate antibiotics if clinical evidence of bacterial infection noted on future exam  3. Monitor CRP, D-dimer, procalcitonin  4. Titrate oxygen as tolerated  5. Prophylactic anticoagulation per primary team  6. Avoid constipation         Above plan was discussed in details with patient,RN.  Please call me if any further questions or concerns. Will continue to participate in the care of this patient. HPI:    Feels better. Patient denies any pleuritic chest pain. Resolved constipation. There are no discharge medications for this patient. Current Facility-Administered Medications   Medication Dose Route Frequency    docusate sodium (COLACE) capsule 100 mg  100 mg Oral DAILY    dexamethasone (DECADRON) 4 mg/mL injection 10 mg  10 mg IntraVENous Q24H    sodium chloride (NS) flush 5-40 mL  5-40 mL IntraVENous Q8H    sodium chloride (NS) flush 5-40 mL  5-40 mL IntraVENous PRN    polyethylene glycol (MIRALAX) packet 17 g  17 g Oral DAILY PRN    ondansetron (ZOFRAN ODT) tablet 4 mg  4 mg Oral Q8H PRN    Or    ondansetron (ZOFRAN) injection 4 mg  4 mg IntraVENous Q6H PRN    enoxaparin (LOVENOX) injection 40 mg  40 mg SubCUTAneous DAILY    famotidine (PEPCID) tablet 20 mg  20 mg Oral BID    acetaminophen (TYLENOL) tablet 650 mg  650 mg Oral Q6H PRN    Or    acetaminophen (TYLENOL) suppository 650 mg  650 mg Rectal Q6H PRN    albuterol-ipratropium (DUO-NEB) 2.5 MG-0.5 MG/3 ML  3 mL Nebulization Q4H PRN       Allergies: Codeine    History reviewed. No pertinent family history.   Social History     Socioeconomic History    Marital status:      Spouse name: Not on file    Number of children: Not on file    Years of education: Not on file    Highest education level: Not on file   Occupational History    Not on file   Tobacco Use    Smoking status: Never Smoker    Smokeless tobacco: Never Used   Substance and Sexual Activity    Alcohol use: No    Drug use: Not Currently     Types: Marijuana, Cocaine     Comment: Stopped using when he was 23years old    Sexual activity: Yes   Other Topics Concern    Not on file   Social History Narrative    Not on file     Social Determinants of Health     Financial Resource Strain:     Difficulty of Paying Living Expenses: Not on file   Food Insecurity:     Worried About Running Out of Food in the Last Year: Not on file    Lindsay of Food in the Last Year: Not on file   Transportation Needs:     Lack of Transportation (Medical): Not on file    Lack of Transportation (Non-Medical): Not on file   Physical Activity:     Days of Exercise per Week: Not on file    Minutes of Exercise per Session: Not on file   Stress:     Feeling of Stress : Not on file   Social Connections:     Frequency of Communication with Friends and Family: Not on file    Frequency of Social Gatherings with Friends and Family: Not on file    Attends Sabianist Services: Not on file    Active Member of [address] [place] [address] [employer] or Organizations: Not on file    Attends Club or Organization Meetings: Not on file    Marital Status: Not on file   Intimate Partner Violence:     Fear of Current or Ex-Partner: Not on file    Emotionally Abused: Not on file    Physically Abused: Not on file    Sexually Abused: Not on file   Housing Stability:     Unable to Pay for Housing in the Last Year: Not on file    Number of [place] in the Last Year: Not on file    Unstable Housing in the Last Year: Not on file     Social History     Tobacco Use   Smoking Status Never Smoker   Smokeless Tobacco Never Used        Temp (24hrs), Av.6 °F (36.4 °C), Min:97.2 °F (36.2 °C), Max:97.9 °F (36.6 °C)    Visit Vitals  /75   Pulse 94   Temp 97.5 °F (36.4 °C)   Resp 18   Ht 5' 9\" (1.753 m)   Wt 77.2 kg (170 lb 3.2 oz)   SpO2 96%   BMI 25.13 kg/m²       ROS: 12 point ROS obtained in details. Pertinent positives as mentioned in HPI,   otherwise negative    Physical Exam:    Vitals signs and nursing note reviewed. Sitting on bed. Appears tachypneic      HENT:      Head: Normocephalic. Eyes:      Conjunctiva/sclera: Conjunctivae normal.      Neck:      Musculoskeletal: Normal range of motion and neck supple.    Cardiovascular:      Rate and Rhythm: Normal rate and regular rhythm on monitor  Chest: Bilateral chest movements equal.  Auscultation deferred due to Covid positive  Abdominal:      General: There is no distension. Palpations: Abdomen is soft. Tenderness: There is no abdominal tenderness. There is no rebound. Musculoskeletal: Normal range of motion. General: No tenderness. Skin:     General: Skin is warm and dry. Findings: No rash. Neurological:      Mental Status: He is alert and oriented to person, place, and time. Cranial Nerves: No cranial nerve deficit. Motor: No abnormal muscle tone. Coordination: Coordination normal.   Psychiatric:         Behavior: Behavior normal.         Thought Content: Thought content normal.         Judgment: Judgment normal.       Labs: Results:   Chemistry Recent Labs     12/09/21  0423 12/08/21  0122 12/07/21  0220   * 149* 127*    136 136   K 5.8* 4.8 5.6*    106 107   CO2 29 27 25   BUN 20* 22* 26*   CREA 1.06 0.97 0.94   CA 8.5 8.4* 8.0*   AGAP 4 3 4   BUCR 19 23* 28*   AP 84 81 91   TP 5.8* 5.9* 5.6*   ALB 2.6* 2.6* 2.4*   GLOB 3.2 3.3 3.2   AGRAT 0.8 0.8 0.8      CBC w/Diff Recent Labs     12/09/21  0423 12/08/21  0122 12/07/21  0220   WBC 7.8 8.5 9.5   RBC 5.41 5.28 5.05   HGB 15.1 14.6 14.4   HCT 46.6 45.0 44.0    353 233   GRANS 89* 92* 92*   LYMPH 5* 3* 4*   EOS 0 0 0      Microbiology Recent Labs     12/07/21  1350   CULT LIGHT PSEUDOMONAS SPECIES*  MODERATE NORMAL RESPIRATORY MAITE          RADIOLOGY:    All available imaging studies/reports in [facility] care for this admission were reviewed    High complexity decision making was performed during the evaluation of this patient at high risk for decompensation with multiple organ involvement         Disclaimer: Sections of this note are dictated utilizing voice recognition software, which may have resulted in some phonetic based errors in grammar and contents.  Even though attempts were made to correct all the mistakes, some may have been missed, and remained in the body of the document. If questions arise, please contact our department.     Dr. Benito Pandya, Infectious Disease Specialist  [phone]  December 9, 2021  11:26 AM

## 2021-12-09 NOTE — PROGRESS NOTES
Discharge planning    Reviewed chart. Pt remains on 30 Liters HFNC. Will need to be assessed for home oxygen and PCP set up prior to discharge. CM will continue to monitor and assist with transition of care needs.        LORENZO Lindsay, RN  Pager # [phone]  Care Manager

## 2021-12-09 NOTE — ROUTINE PROCESS
3731: Bedside and Verbal shift change report given to [address] (oncoming nurse) by Julio Toussaint RN (offgoing nurse). Report included the following information SBAR, Kardex, Intake/Output, MAR and Recent Results. 1100: Pt had BM following suppository and Miralax. 1920: Bedside and Verbal shift change report given to [address] (oncoming nurse) by [address] (offgoing nurse). Report included the following information SBAR, Kardex, Intake/Output, MAR and Recent Results.

## 2021-12-09 NOTE — PROGRESS NOTES
12/08/21 2150   Oxygen Therapy   O2 Sat (%) 94 %   Pulse via Oximetry 81 beats per minute   O2 Device Heated;  Hi flow nasal cannula   O2 Flow Rate (L/min) 30 l/min   O2 Temperature 91.4 °F (33 °C)   FIO2 (%) 40 %

## 2021-12-09 NOTE — PROGRESS NOTES
Problem: Risk for Spread of Infection  Goal: Prevent transmission of infectious organism to others  Description: Prevent the transmission of infectious organisms to other patients, staff members, and visitors. Outcome: Progressing Towards Goal     Problem: Falls - Risk of  Goal: *Absence of Falls  Description: Document Yao Elizondo Fall Risk and appropriate interventions in the flowsheet.   Outcome: Progressing Towards Goal  Note: Fall Risk Interventions:            Medication Interventions: Teach patient to arise slowly                   Problem: Patient Education:  Go to Education Activity  Goal: Patient/Family Education  Outcome: Progressing Towards Goal     Problem: Patient Education: Go to Patient Education Activity  Goal: Patient/Family Education  Outcome: Progressing Towards Goal

## 2021-12-10 LAB
ALBUMIN SERPL-MCNC: 2.5 G/DL (ref 3.4–5)
ALBUMIN/GLOB SERPL: 0.7 {RATIO} (ref 0.8–1.7)
ALP SERPL-CCNC: 71 U/L (ref 45–117)
ALT SERPL-CCNC: 143 U/L (ref 16–61)
ANION GAP SERPL CALC-SCNC: 6 MMOL/L (ref 3–18)
AST SERPL-CCNC: 21 U/L (ref 10–38)
BASOPHILS # BLD: 0 K/UL (ref 0–0.1)
BASOPHILS NFR BLD: 0 % (ref 0–2)
BILIRUB SERPL-MCNC: 0.6 MG/DL (ref 0.2–1)
BUN SERPL-MCNC: 29 MG/DL (ref 7–18)
BUN/CREAT SERPL: 26 (ref 12–20)
CALCIUM SERPL-MCNC: 8.7 MG/DL (ref 8.5–10.1)
CHLORIDE SERPL-SCNC: 102 MMOL/L (ref 100–111)
CO2 SERPL-SCNC: 28 MMOL/L (ref 21–32)
CREAT SERPL-MCNC: 1.1 MG/DL (ref 0.6–1.3)
CRP SERPL-MCNC: <0.3 MG/DL (ref 0–0.3)
D DIMER PPP FEU-MCNC: 0.67 UG/ML(FEU)
DIFFERENTIAL METHOD BLD: ABNORMAL
EOSINOPHIL # BLD: 0 K/UL (ref 0–0.4)
EOSINOPHIL NFR BLD: 0 % (ref 0–5)
ERYTHROCYTE [DISTWIDTH] IN BLOOD BY AUTOMATED COUNT: 12.6 % (ref 11.6–14.5)
GLOBULIN SER CALC-MCNC: 3.4 G/DL (ref 2–4)
GLUCOSE BLD STRIP.AUTO-MCNC: 113 MG/DL (ref 70–110)
GLUCOSE BLD STRIP.AUTO-MCNC: 97 MG/DL (ref 70–110)
GLUCOSE SERPL-MCNC: 126 MG/DL (ref 74–99)
HCT VFR BLD AUTO: 45.6 % (ref 36–48)
HGB BLD-MCNC: 14.7 G/DL (ref 13–16)
IMM GRANULOCYTES # BLD AUTO: 0.1 K/UL (ref 0–0.04)
IMM GRANULOCYTES NFR BLD AUTO: 1 % (ref 0–0.5)
LYMPHOCYTES # BLD: 0.4 K/UL (ref 0.9–3.6)
LYMPHOCYTES NFR BLD: 5 % (ref 21–52)
MCH RBC QN AUTO: 27.8 PG (ref 24–34)
MCHC RBC AUTO-ENTMCNC: 32.2 G/DL (ref 31–37)
MCV RBC AUTO: 86.2 FL (ref 78–100)
MONOCYTES # BLD: 0.5 K/UL (ref 0.05–1.2)
MONOCYTES NFR BLD: 6 % (ref 3–10)
NEUTS SEG # BLD: 7.3 K/UL (ref 1.8–8)
NEUTS SEG NFR BLD: 88 % (ref 40–73)
NRBC # BLD: 0 K/UL (ref 0–0.01)
NRBC BLD-RTO: 0 PER 100 WBC
PLATELET # BLD AUTO: 363 K/UL (ref 135–420)
PMV BLD AUTO: 10.3 FL (ref 9.2–11.8)
POTASSIUM SERPL-SCNC: 5 MMOL/L (ref 3.5–5.5)
PROCALCITONIN SERPL-MCNC: <0.05 NG/ML
PROT SERPL-MCNC: 5.9 G/DL (ref 6.4–8.2)
RBC # BLD AUTO: 5.29 M/UL (ref 4.35–5.65)
SODIUM SERPL-SCNC: 136 MMOL/L (ref 136–145)
WBC # BLD AUTO: 8.4 K/UL (ref 4.6–13.2)

## 2021-12-10 PROCEDURE — 99232 SBSQ HOSP IP/OBS MODERATE 35: CPT | Performed by: FAMILY MEDICINE

## 2021-12-10 PROCEDURE — 65660000000 HC RM CCU STEPDOWN

## 2021-12-10 PROCEDURE — 74011250636 HC RX REV CODE- 250/636: Performed by: INTERNAL MEDICINE

## 2021-12-10 PROCEDURE — 80053 COMPREHEN METABOLIC PANEL: CPT

## 2021-12-10 PROCEDURE — 94761 N-INVAS EAR/PLS OXIMETRY MLT: CPT

## 2021-12-10 PROCEDURE — 86140 C-REACTIVE PROTEIN: CPT

## 2021-12-10 PROCEDURE — 82962 GLUCOSE BLOOD TEST: CPT

## 2021-12-10 PROCEDURE — 77010033678 HC OXYGEN DAILY

## 2021-12-10 PROCEDURE — 84145 PROCALCITONIN (PCT): CPT

## 2021-12-10 PROCEDURE — 74011250637 HC RX REV CODE- 250/637: Performed by: FAMILY MEDICINE

## 2021-12-10 PROCEDURE — 74011250637 HC RX REV CODE- 250/637: Performed by: INTERNAL MEDICINE

## 2021-12-10 PROCEDURE — 2709999900 HC NON-CHARGEABLE SUPPLY

## 2021-12-10 PROCEDURE — 85379 FIBRIN DEGRADATION QUANT: CPT

## 2021-12-10 PROCEDURE — 77010033711 HC HIGH FLOW OXYGEN

## 2021-12-10 PROCEDURE — 85025 COMPLETE CBC W/AUTO DIFF WBC: CPT

## 2021-12-10 PROCEDURE — 36415 COLL VENOUS BLD VENIPUNCTURE: CPT

## 2021-12-10 PROCEDURE — 94762 N-INVAS EAR/PLS OXIMTRY CONT: CPT

## 2021-12-10 RX ADMIN — FAMOTIDINE 20 MG: 20 TABLET ORAL at 08:17

## 2021-12-10 RX ADMIN — Medication 10 ML: at 05:54

## 2021-12-10 RX ADMIN — FAMOTIDINE 20 MG: 20 TABLET ORAL at 18:06

## 2021-12-10 RX ADMIN — Medication 10 ML: at 17:50

## 2021-12-10 RX ADMIN — DOCUSATE SODIUM 100 MG: 100 CAPSULE ORAL at 08:17

## 2021-12-10 RX ADMIN — DEXAMETHASONE SODIUM PHOSPHATE 10 MG: 4 INJECTION, SOLUTION INTRAMUSCULAR; INTRAVENOUS at 18:06

## 2021-12-10 RX ADMIN — ENOXAPARIN SODIUM 40 MG: 100 INJECTION SUBCUTANEOUS at 08:17

## 2021-12-10 NOTE — PROGRESS NOTES
Progress Note         Patient: Vimal Ewing MRN: [id number]  CSN: [id number]    YOB: 1970  Age: 45 y.o. Sex: male    DOA: 12/1/2021 LOS:  LOS: 9 days                    Subjective:     Vimal Ewing is a 45 y.o. male with a PMHx of nephrolithiasis who is admitted for acute hypoxic respiratory failure secondary to severe Covid19 pneumonia. Seen earlier in room  Sitting up in bed, NAD  Reports continued improvement in shortness of breath  Has significant KYLE with any exertion  Cough improved  Appetite is good    His wife is also in the hospital with Covid    Hypoxia improved  SPO2 in the mid 90s on 20 L/min, 30% O2      Objective:     Physical Exam:  Visit Vitals  /77 (BP 1 Location: Left upper arm, BP Patient Position: Supine)   Pulse 91   Temp 98.1 °F (36.7 °C)   Resp 21   Ht 5' 9\" (1.753 m)   Wt 77.1 kg (170 lb)   SpO2 93%   BMI 25.10 kg/m²        General:         Alert, cooperative, no acute distress    HEENT: NC, Atraumatic. Anicteric sclerae. Lungs: CTA Bilaterally. No Wheezing/Rhonchi/Rales. Heart:  Regular  rhythm,  No murmur, No Rubs, No Gallops  Abdomen: Soft, Non distended, Non tender. +Bowel sounds, no HSM  Extremities: No c/c/e  Psych:   Not anxious or agitated. Neurologic:  Alert and oriented X 3. No acute neurological deficits. Intake and Output:  Current Shift:  12/10 0701 - 12/10 1900  In: -   Out: 800 [Urine:800]  Last three shifts:  No intake/output data recorded.     Labs: Results:       Chemistry Recent Labs     12/10/21  0552 12/09/21  0423 12/08/21  0122   * 126* 149*    137 136   K 5.0 5.8* 4.8    104 106   CO2 28 29 27   BUN 29* 20* 22*   CREA 1.10 1.06 0.97   CA 8.7 8.5 8.4*   AGAP 6 4 3   BUCR 26* 19 23*   AP 71 84 81   TP 5.9* 5.8* 5.9*   ALB 2.5* 2.6* 2.6*   GLOB 3.4 3.2 3.3   AGRAT 0.7* 0.8 0.8      CBC w/Diff Recent Labs     12/10/21  0552 12/09/21  0423 12/08/21  0122   WBC 8.4 7.8 8.5   RBC 5.29 5.41 5.28   HGB 14.7 15.1 14.6   HCT 45.6 46.6 45.0    373 353   GRANS 88* 89* 92*   LYMPH 5* 5* 3*   EOS 0 0 0      Cardiac Enzymes No results for input(s): CPK, CKND1, SABINA in the last 72 hours. No lab exists for component: CKRMB, TROIP   Coagulation No results for input(s): PTP, INR, APTT, INREXT, INREXT in the last 72 hours. Lipid Panel No results found for: CHOL, CHOLPOCT, CHOLX, CHLST, CHOLV, 509065, HDL, HDLP, LDL, LDLC, DLDLP, 986205, VLDLC, VLDL, TGLX, TRIGL, TRIGP, TGLPOCT, CHHD, CHHDX   BNP No results for input(s): BNPP in the last 72 hours. Liver Enzymes Recent Labs     12/10/21  0552   TP 5.9*   ALB 2.5*   AP 71      Thyroid Studies No results found for: T4, T3U, TSH, TSHEXT, TSHEXT             Assessment and Plan:     Rahat Tineo is a 45 y.o. male with a PMHx of nephrolithiasis who is admitted for acute hypoxic respiratory failure secondary to severe Covid19 pneumonia. 1. Acute hypoxic respiratory failure  2. Severe Covid19 pneumonia  3. Constipationresolved    ID following  BUE and BLE PVLs showed no DVT  Continue O2 as needed and wean as tolerated  Has completed course of IV steroids after today's dose  Follow-up S&S of Pseudomonas in sputum culture  Follow-up inflammatory markers and BMP  Incentive spirometry  Droplet plus precautions      Case discussed with:  [x]Patient  []Family  [x]Nursing  [x]Case Management  DVT prophylaxis: Lovenox  Diet: Regular  Contact: Beth Gilbert (wife)      [phone]  Code Status: Full  Disposition: Continue current care, greater than 2 nights      H. Ute Emerson DO  12/10/2021       Dragon medical dictation software was used for portions of this report. Unintended errors may occur.

## 2021-12-10 NOTE — PROGRESS NOTES
Problem: Risk for Spread of Infection  Goal: Prevent transmission of infectious organism to others  Description: Prevent the transmission of infectious organisms to other patients, staff members, and visitors. Outcome: Progressing Towards Goal     Problem: Patient Education:  Go to Education Activity  Goal: Patient/Family Education  Outcome: Progressing Towards Goal     Problem: Falls - Risk of  Goal: *Absence of Falls  Description: Document Sergiovolodymyr Serna Fall Risk and appropriate interventions in the flowsheet. Outcome: Progressing Towards Goal  Note: Fall Risk Interventions:            Medication Interventions: Evaluate medications/consider consulting pharmacy, Bed/chair exit alarm, Patient to call before getting OOB, Teach patient to arise slowly         History of Falls Interventions: Bed/chair exit alarm, Room close to nurse's station         Problem: Patient Education: Go to Patient Education Activity  Goal: Patient/Family Education  Outcome: Progressing Towards Goal     Problem: Pain  Goal: *Control of Pain  Outcome: Progressing Towards Goal  Goal: *PALLIATIVE CARE:  Alleviation of Pain  Outcome: Progressing Towards Goal     Problem: Patient Education: Go to Patient Education Activity  Goal: Patient/Family Education  Outcome: Progressing Towards Goal     Problem: Pressure Injury - Risk of  Goal: *Prevention of pressure injury  Description: Document Dann Scale and appropriate interventions in the flowsheet. Outcome: Progressing Towards Goal  Note: Pressure Injury Interventions:             Activity Interventions: Pressure redistribution bed/mattress(bed type)         Nutrition Interventions: Document food/fluid/supplement intake                     Problem: Patient Education: Go to Patient Education Activity  Goal: Patient/Family Education  Outcome: Progressing Towards Goal     Problem: Airway Clearance - Ineffective  Goal: Achieve or maintain patent airway  Outcome: Progressing Towards Goal     Problem: Gas Exchange - Impaired  Goal: Absence of hypoxia  Outcome: Progressing Towards Goal  Goal: Promote optimal lung function  Outcome: Progressing Towards Goal     Problem:  Body Temperature -  Risk of, Imbalanced  Goal: Ability to maintain a body temperature within defined limits  Outcome: Progressing Towards Goal  Goal: Will regain or maintain usual level of consciousness  Outcome: Progressing Towards Goal  Goal: Complications related to the disease process, condition or treatment will be avoided or minimized  Outcome: Progressing Towards Goal     Problem: Isolation Precautions - Risk of Spread of Infection  Goal: Prevent transmission of infectious organism to others  Outcome: Progressing Towards Goal     Problem: Risk for Fluid Volume Deficit  Goal: Maintain normal heart rhythm  Outcome: Progressing Towards Goal  Goal: Maintain absence of muscle cramping  Outcome: Progressing Towards Goal  Goal: Maintain normal serum potassium, sodium, calcium, phosphorus, and pH  Outcome: Progressing Towards Goal     Problem: Loneliness or Risk for Loneliness  Goal: Demonstrate positive use of time alone when socialization is not possible  Outcome: Progressing Towards Goal     Problem: Fatigue  Goal: Verbalize increase energy and improved vitality  Outcome: Progressing Towards Goal     Problem: Patient Education: Go to Patient Education Activity  Goal: Patient/Family Education  Outcome: Progressing Towards Goal

## 2021-12-10 NOTE — ROUTINE PROCESS
0710: Received report from [address]    1800: Pt remained on 30L 40%. 1932: Bedside and Verbal shift change report given to [address] (oncoming nurse) by [address] (offgoing nurse). Report included the following information SBAR, Kardex, Intake/Output, MAR and Recent Results.

## 2021-12-10 NOTE — PROGRESS NOTES
1950 dside and Verbal shift change  Received from [facility], [facility] (outgoing nurse), to CANDACE Camacho (oncoming)  Pt. Is AOX 4. IV SL, Pt. denies  pain at this time. Report included the following information SBAR, Kardex, Procedure Summary, Intake/Output, MAR, Recent Lab Results, and  Cardiac Rhythm @ SR. Will resume care and monitor Pt. Condition. Pt. Educated on call bell when in need of help and assistance. Pt. verbalize understanding. Pt. Head to toe Assessment Done and documented. 2000 Pt made no complaints. 2130 Pt. Denies pain. 2300  Pt resting comfortably in bed. 0000  Pt resting with eyes closed, easily awaken. 0200  Pt. Denies discomfort. 0400  Pt denies pain. 0600  Pt. Able to rest and sleep well throughout the shift.

## 2021-12-10 NOTE — PROGRESS NOTES
Infectious Disease progress Note        Reason: Acute hypoxic respiratory failure, confirmed COVID-19 pneumonia    Current abx Prior abx     Ceftriaxone 12/1-12/6  azithromycin since 12/1/2021-12/7/21     Lines:       Assessment :    41-year-old man with no significant past medical history admitted to [facility] on 12/1/2021 with increasing shortness of breath for 2 to 3 days. Symptoms of COVID-19 since 11/22/2021  Positive Covid test 11/26/2021    Sudden worsening hypoxia with oxygen saturation in 70s on presentation  Rapidly worsening oxygenation now on high flow oxygen at 40 L, tachypnea, elevated ferritin- 7000s, crp: 7.8    Clinical presentation consistent with acute hypoxic respiratory failure-present on admission due to severe COVID-19 pneumonia    S/p high dose decadron, tocilizumab on 12/1/21    Mild elevated procalcitonin-could be secondary to severe COVID-19 pneumonia. Will monitor for superimposed bacterial infection    Mild elevated [id number] due to severe COVID-19 pneumonia. No evidence of pulmonary embolism noted on CTA chest 12/1/2021. No evidence of DVT noted on venous duplex bilateral extremity 12/8/2021      Gradually improving hypoxia. Cough with yellowish sputum. R/o superimposed bacterial pneumonia. Sputum culture 12/7-normal respiratory kinsey, pseudomonas    Currently patient is improving clinically off antibiotics. Low procalcitonin. Improved cough. Pseudomonas in sputum culture could be colonizer. Hence we will continue monitoring off antibiotics        Recommendations:    1. Discontinue Decadron   2.   initiate antibiotics against pseudomonas if clinical evidence of bacterial infection noted on future exam  3. Monitor CRP, D-dimer, procalcitonin  4. Titrate oxygen as tolerated  5. Prophylactic anticoagulation per primary team  6. Avoid constipation         Above plan was discussed in details with patient,RN. Please call me if any further questions or concerns.  Will continue to participate in the care of this patient. HPI:    Feels better. Patient denies any pleuritic chest pain. Resolved constipation. There are no discharge medications for this patient. Current Facility-Administered Medications   Medication Dose Route Frequency    docusate sodium (COLACE) capsule 100 mg  100 mg Oral DAILY    dexamethasone (DECADRON) 4 mg/mL injection 10 mg  10 mg IntraVENous Q24H    sodium chloride (NS) flush 5-40 mL  5-40 mL IntraVENous Q8H    sodium chloride (NS) flush 5-40 mL  5-40 mL IntraVENous PRN    polyethylene glycol (MIRALAX) packet 17 g  17 g Oral DAILY PRN    ondansetron (ZOFRAN ODT) tablet 4 mg  4 mg Oral Q8H PRN    Or    ondansetron (ZOFRAN) injection 4 mg  4 mg IntraVENous Q6H PRN    enoxaparin (LOVENOX) injection 40 mg  40 mg SubCUTAneous DAILY    famotidine (PEPCID) tablet 20 mg  20 mg Oral BID    acetaminophen (TYLENOL) tablet 650 mg  650 mg Oral Q6H PRN    Or    acetaminophen (TYLENOL) suppository 650 mg  650 mg Rectal Q6H PRN    albuterol-ipratropium (DUO-NEB) 2.5 MG-0.5 MG/3 ML  3 mL Nebulization Q4H PRN       Allergies: Codeine    History reviewed. No pertinent family history.   Social History     Socioeconomic History    Marital status:      Spouse name: Not on file    Number of children: Not on file    Years of education: Not on file    Highest education level: Not on file   Occupational History    Not on file   Tobacco Use    Smoking status: Never Smoker    Smokeless tobacco: Never Used   Substance and Sexual Activity    Alcohol use: No    Drug use: Not Currently     Types: Marijuana, Cocaine     Comment: Stopped using when he was 23years old    Sexual activity: Yes   Other Topics Concern    Not on file   Social History Narrative    Not on file     Social Determinants of Health     Financial Resource Strain:     Difficulty of Paying Living Expenses: Not on file   Food Insecurity:     Worried About Running Out of Food in the Last Year: Not on file    [address] in the Last Year: Not on file   Transportation Needs:     Lack of Transportation (Medical): Not on file    Lack of Transportation (Non-Medical): Not on file   Physical Activity:     Days of Exercise per Week: Not on file    Minutes of Exercise per Session: Not on file   Stress:     Feeling of Stress : Not on file   Social Connections:     Frequency of Communication with Friends and Family: Not on file    Frequency of Social Gatherings with Friends and Family: Not on file    Attends [employer] Services: Not on file    Active Member of [address] [place] [address] [employer] or Organizations: Not on file    Attends Club or Organization Meetings: Not on file    Marital Status: Not on file   Intimate Partner Violence:     Fear of Current or Ex-Partner: Not on file    Emotionally Abused: Not on file    Physically Abused: Not on file    Sexually Abused: Not on file   Housing Stability:     Unable to Pay for Housing in the Last Year: Not on file    Number of [place] in the Last Year: Not on file    Unstable Housing in the Last Year: Not on file     Social History     Tobacco Use   Smoking Status Never Smoker   Smokeless Tobacco Never Used        Temp (24hrs), Av.9 °F (36.6 °C), Min:97.5 °F (36.4 °C), Max:98.6 °F (37 °C)    Visit Vitals  /88   Pulse 71   Temp 98.6 °F (37 °C)   Resp 16   Ht 5' 9\" (1.753 m)   Wt 77.2 kg (170 lb 3.2 oz)   SpO2 92%   BMI 25.13 kg/m²       ROS: 12 point ROS obtained in details. Pertinent positives as mentioned in HPI,   otherwise negative    Physical Exam:    Vitals signs and nursing note reviewed. Sitting on bed. Appears tachypneic      HENT:      Head: Normocephalic. Eyes:      Conjunctiva/sclera: Conjunctivae normal.      Neck:      Musculoskeletal: Normal range of motion and neck supple.    Cardiovascular:      Rate and Rhythm: Normal rate and regular rhythm on monitor  Chest:      Bilateral chest movements equal.  Auscultation deferred due to Covid positive  Abdominal:      General: There is no distension. Palpations: Abdomen is soft. Tenderness: There is no abdominal tenderness. There is no rebound. Musculoskeletal: Normal range of motion. General: No tenderness. Skin:     General: Skin is warm and dry. Findings: No rash. Neurological:      Mental Status: He is alert and oriented to person, place, and time. Cranial Nerves: No cranial nerve deficit. Motor: No abnormal muscle tone. Coordination: Coordination normal.   Psychiatric:         Behavior: Behavior normal.         Thought Content: Thought content normal.         Judgment: Judgment normal.       Labs: Results:   Chemistry Recent Labs     12/10/21  0552 12/09/21  0423 12/08/21  0122   * 126* 149*    137 136   K 5.0 5.8* 4.8    104 106   CO2 28 29 27   BUN 29* 20* 22*   CREA 1.10 1.06 0.97   CA 8.7 8.5 8.4*   AGAP 6 4 3   BUCR 26* 19 23*   AP 71 84 81   TP 5.9* 5.8* 5.9*   ALB 2.5* 2.6* 2.6*   GLOB 3.4 3.2 3.3   AGRAT 0.7* 0.8 0.8      CBC w/Diff Recent Labs     12/10/21  0552 12/09/21  0423 12/08/21  0122   WBC 8.4 7.8 8.5   RBC 5.29 5.41 5.28   HGB 14.7 15.1 14.6   HCT 45.6 46.6 45.0    373 353   GRANS 88* 89* 92*   LYMPH 5* 5* 3*   EOS 0 0 0      Microbiology Recent Labs     12/07/21  1350   CULT LIGHT PSEUDOMONAS SPECIES*  MODERATE NORMAL RESPIRATORY MAITE          RADIOLOGY:    All available imaging studies/reports in [facility] for this admission were reviewed        Disclaimer: Sections of this note are dictated utilizing voice recognition software, which may have resulted in some phonetic based errors in grammar and contents. Even though attempts were made to correct all the mistakes, some may have been missed, and remained in the body of the document. If questions arise, please contact our department.     Dr. Paul Sheridan, Infectious Disease Specialist  [phone]  December 10, 2021  11:26 AM

## 2021-12-11 LAB
ANION GAP SERPL CALC-SCNC: 9 MMOL/L (ref 3–18)
BACTERIA SPEC CULT: ABNORMAL
BUN SERPL-MCNC: 29 MG/DL (ref 7–18)
BUN/CREAT SERPL: 27 (ref 12–20)
CALCIUM SERPL-MCNC: 8.8 MG/DL (ref 8.5–10.1)
CHLORIDE SERPL-SCNC: 103 MMOL/L (ref 100–111)
CO2 SERPL-SCNC: 24 MMOL/L (ref 21–32)
CREAT SERPL-MCNC: 1.09 MG/DL (ref 0.6–1.3)
CRP SERPL-MCNC: <0.3 MG/DL (ref 0–0.3)
D DIMER PPP FEU-MCNC: 0.54 UG/ML(FEU)
GLUCOSE SERPL-MCNC: 113 MG/DL (ref 74–99)
GRAM STN SPEC: ABNORMAL
POTASSIUM SERPL-SCNC: 4.5 MMOL/L (ref 3.5–5.5)
PROCALCITONIN SERPL-MCNC: <0.05 NG/ML
SERVICE CMNT-IMP: ABNORMAL
SODIUM SERPL-SCNC: 136 MMOL/L (ref 136–145)

## 2021-12-11 PROCEDURE — 77010033678 HC OXYGEN DAILY

## 2021-12-11 PROCEDURE — 65660000000 HC RM CCU STEPDOWN

## 2021-12-11 PROCEDURE — 74011250636 HC RX REV CODE- 250/636: Performed by: INTERNAL MEDICINE

## 2021-12-11 PROCEDURE — 94762 N-INVAS EAR/PLS OXIMTRY CONT: CPT

## 2021-12-11 PROCEDURE — 80048 BASIC METABOLIC PNL TOTAL CA: CPT

## 2021-12-11 PROCEDURE — 36415 COLL VENOUS BLD VENIPUNCTURE: CPT

## 2021-12-11 PROCEDURE — 74011250637 HC RX REV CODE- 250/637: Performed by: FAMILY MEDICINE

## 2021-12-11 PROCEDURE — 74011250637 HC RX REV CODE- 250/637: Performed by: INTERNAL MEDICINE

## 2021-12-11 PROCEDURE — 99232 SBSQ HOSP IP/OBS MODERATE 35: CPT | Performed by: FAMILY MEDICINE

## 2021-12-11 PROCEDURE — 77010033711 HC HIGH FLOW OXYGEN

## 2021-12-11 PROCEDURE — 86140 C-REACTIVE PROTEIN: CPT

## 2021-12-11 PROCEDURE — 84145 PROCALCITONIN (PCT): CPT

## 2021-12-11 PROCEDURE — 85379 FIBRIN DEGRADATION QUANT: CPT

## 2021-12-11 RX ADMIN — ENOXAPARIN SODIUM 40 MG: 100 INJECTION SUBCUTANEOUS at 09:04

## 2021-12-11 RX ADMIN — DOCUSATE SODIUM 100 MG: 100 CAPSULE ORAL at 09:04

## 2021-12-11 RX ADMIN — Medication 10 ML: at 05:01

## 2021-12-11 RX ADMIN — Medication 10 ML: at 14:50

## 2021-12-11 RX ADMIN — FAMOTIDINE 20 MG: 20 TABLET ORAL at 17:13

## 2021-12-11 RX ADMIN — FAMOTIDINE 20 MG: 20 TABLET ORAL at 09:04

## 2021-12-11 RX ADMIN — Medication 10 ML: at 22:22

## 2021-12-11 RX ADMIN — Medication 10 ML: at 04:57

## 2021-12-11 NOTE — PROGRESS NOTES
1905 Bedside and Verbal shift change  Received from Keren Harris RN (outgoing nurse), to CANDACE Curry (oncoming)  Pt. Is AOX 4. IV SL, Pt. denies  pain at this time. Report included the following information SBAR, Kardex, Procedure Summary, Intake/Output, MAR, Recent Lab Results, and  Cardiac Rhythm @ SR. Will resume care and monitor Pt. Condition. Pt. Educated on call bell when in need of help and assistance. Pt. Verbalized understanding. 2000 Pt. Head to toe Assessment Done and documented. 2130  Pt. Resting comfortably in bed, no sign of distress. 2300  Pt. Watching tv.    0100 Pt. Resting with eyes closed, easily awaken. 0300  Pt. Not in distress. 0630  Pt. Made no complaints. Verbal and bedside Shift changed report given to Pj Lee RN (oncoming RN) on Pt. Condition. Report consisted of patients Situation, History, Activities, intake/output,  Background, Assessment and Recommendations(SBAR). Information from the following report(s) Kardex, order Summary, Lab results and MAR was reviewed with the receiving nurse. Opportunity for questions and clarification was provided.

## 2021-12-11 NOTE — PROGRESS NOTES
Progress Note         Patient: Karis Liu MRN: [id number]  CSN: [id number]    YOB: 1970  Age: 45 y.o. Sex: male    DOA: 12/1/2021 LOS:  LOS: 10 days                    Subjective:     Karis Liu is a 45 y.o. male with a PMHx of nephrolithiasis who is admitted for acute hypoxic respiratory failure secondary to severe Covid19 pneumonia. No significant change in patient condition today    Seen earlier in room  Sitting up in bed, NAD  Reports continued improvement in shortness of breath  Has significant KYLE with any exertion  Cough improved, appetite is good    His wife is also in the hospital with Covid    Hypoxia improved  SPO2 in the mid 90s on 20 L/min, 35% O2      Objective:     Physical Exam:  Visit Vitals  BP (!) 100/58 (BP 1 Location: Right upper arm, BP Patient Position: At rest)   Pulse 88   Temp 97.8 °F (36.6 °C)   Resp 22   Ht 5' 9\" (1.753 m)   Wt 77.1 kg (170 lb)   SpO2 97%   BMI 25.10 kg/m²        General:         Alert, cooperative, no acute distress    HEENT: NC, Atraumatic. Anicteric sclerae. Lungs: CTA Bilaterally. No Wheezing/Rhonchi/Rales. Heart:  Regular  rhythm,  No murmur, No Rubs, No Gallops  Abdomen: Soft, Non distended, Non tender. +Bowel sounds, no HSM  Extremities: No c/c/e  Psych:   Not anxious or agitated. Neurologic:  Alert and oriented X 3. No acute neurological deficits. Intake and Output:  Current Shift:  No intake/output data recorded.   Last three shifts:  12/09 1901 - 12/11 0700  In: -   Out: 800 [Urine:800]    Labs: Results:       Chemistry Recent Labs     12/11/21  0450 12/10/21  0552 12/09/21  0423   * 126* 126*    136 137   K 4.5 5.0 5.8*    102 104   CO2 24 28 29   BUN 29* 29* 20*   CREA 1.09 1.10 1.06   CA 8.8 8.7 8.5   AGAP 9 6 4   BUCR 27* 26* 19   AP  --  71 84   TP  --  5.9* 5.8*   ALB  --  2.5* 2.6*   GLOB  --  3.4 3.2   AGRAT  --  0.7* 0.8      CBC w/Diff Recent Labs     12/10/21  0552 12/09/21  0421 WBC 8.4 7.8   RBC 5.29 5.41   HGB 14.7 15.1   HCT 45.6 46.6    373   GRANS 88* 89*   LYMPH 5* 5*   EOS 0 0      Cardiac Enzymes No results for input(s): CPK, CKND1, SABINA in the last 72 hours. No lab exists for component: CKRMB, TROIP   Coagulation No results for input(s): PTP, INR, APTT, INREXT, INREXT in the last 72 hours. Lipid Panel No results found for: CHOL, CHOLPOCT, CHOLX, CHLST, CHOLV, 454554, HDL, HDLP, LDL, LDLC, DLDLP, 012912, VLDLC, VLDL, TGLX, TRIGL, TRIGP, TGLPOCT, CHHD, CHHDX   BNP No results for input(s): BNPP in the last 72 hours. Liver Enzymes Recent Labs     12/10/21  0552   TP 5.9*   ALB 2.5*   AP 71      Thyroid Studies No results found for: T4, T3U, TSH, TSHEXT, TSHEXT             Assessment and Plan:     Karis Liu is a 45 y.o. male with a PMHx of nephrolithiasis who is admitted for acute hypoxic respiratory failure secondary to severe Covid19 pneumonia. 1. Acute hypoxic respiratory failure  2. Severe Covid19 pneumonia  3. Constipationresolved    ID following  BUE and BLE PVLs showed no DVT  Continue O2 as needed and wean as tolerated  Has completed course of IV steroids  Follow-up inflammatory markers and BMP  Incentive spirometry  Droplet plus precautions      Case discussed with:  [x]Patient  []Family  [x]Nursing  []Case Management  DVT prophylaxis: Lovenox  Diet: Regular  Contact: Mitra Melendrez (wife)      [phone]  Code Status: Full  Disposition: Continue current care, greater than 2 nights       H. Pau Saleem DO  12/11/2021       Dragon medical dictation software was used for portions of this report. Unintended errors may occur.

## 2021-12-11 NOTE — PROGRESS NOTES
0720: Bedside shift change report given to Ameya Mora RN (oncoming nurse) by Karen Gonsalez (offgoing nurse). Report included the following information SBAR, Kardex, [facility] and Cardiac Rhythm NSR.     1915: Bedside shift change report given to Mayra Matthews RN (oncoming nurse) by Ameya Mora RN (offgoing nurse). Report included the following information SBAR, Kardex, Intake/Output, MAR and Cardiac Rhythm NSR.     1540: Dr. Calvin Omalley rounding on pt.    1925: Bedside shift change report given to Mayra Matthews RN (oncoming nurse) by Ameya Mora RN (offgoing nurse). Report included the following information SBAR, Kardex, Intake/Output, MAR and Cardiac Rhythm NSR.

## 2021-12-11 NOTE — PROGRESS NOTES
Problem: Risk for Spread of Infection  Goal: Prevent transmission of infectious organism to others  Description: Prevent the transmission of infectious organisms to other patients, staff members, and visitors. Outcome: Progressing Towards Goal     Problem: Patient Education:  Go to Education Activity  Goal: Patient/Family Education  Outcome: Progressing Towards Goal     Problem: Falls - Risk of  Goal: *Absence of Falls  Description: Document Dorcas Tesss Fall Risk and appropriate interventions in the flowsheet. Outcome: Progressing Towards Goal  Note: Fall Risk Interventions:            Medication Interventions: Bed/chair exit alarm, Evaluate medications/consider consulting pharmacy, Patient to call before getting OOB, Teach patient to arise slowly         History of Falls Interventions: Bed/chair exit alarm         Problem: Patient Education: Go to Patient Education Activity  Goal: Patient/Family Education  Outcome: Progressing Towards Goal     Problem: Pain  Goal: *Control of Pain  Outcome: Progressing Towards Goal  Goal: *PALLIATIVE CARE:  Alleviation of Pain  Outcome: Progressing Towards Goal     Problem: Patient Education: Go to Patient Education Activity  Goal: Patient/Family Education  Outcome: Progressing Towards Goal     Problem: Pressure Injury - Risk of  Goal: *Prevention of pressure injury  Description: Document Dann Scale and appropriate interventions in the flowsheet. Outcome: Progressing Towards Goal  Note: Pressure Injury Interventions:             Activity Interventions: Pressure redistribution bed/mattress(bed type)    Mobility Interventions: Pressure redistribution bed/mattress (bed type)    Nutrition Interventions: Document food/fluid/supplement intake                     Problem: Patient Education: Go to Patient Education Activity  Goal: Patient/Family Education  Outcome: Progressing Towards Goal     Problem: Airway Clearance - Ineffective  Goal: Achieve or maintain patent airway  Outcome: Progressing Towards Goal     Problem: Gas Exchange - Impaired  Goal: Absence of hypoxia  Outcome: Progressing Towards Goal  Goal: Promote optimal lung function  Outcome: Progressing Towards Goal     Problem: Breathing Pattern - Ineffective  Goal: Ability to achieve and maintain a regular respiratory rate  Outcome: Progressing Towards Goal     Problem: Body Temperature -  Risk of, Imbalanced  Goal: Ability to maintain a body temperature within defined limits  Outcome: Progressing Towards Goal  Goal: Will regain or maintain usual level of consciousness  Outcome: Progressing Towards Goal  Goal: Complications related to the disease process, condition or treatment will be avoided or minimized  Outcome: Progressing Towards Goal     Problem: Isolation Precautions - Risk of Spread of Infection  Goal: Prevent transmission of infectious organism to others  Outcome: Progressing Towards Goal     Problem: Nutrition Deficits  Goal: Optimize nutrtional status  Outcome: Progressing Towards Goal     Problem: Risk for Fluid Volume Deficit  Goal: Maintain normal heart rhythm  Outcome: Progressing Towards Goal  Goal: Maintain absence of muscle cramping  Outcome: Progressing Towards Goal  Goal: Maintain normal serum potassium, sodium, calcium, phosphorus, and pH  Outcome: Progressing Towards Goal     Problem: Loneliness or Risk for Loneliness  Goal: Demonstrate positive use of time alone when socialization is not possible  Outcome: Progressing Towards Goal     Problem: Fatigue  Goal: Verbalize increase energy and improved vitality  Outcome: Progressing Towards Goal     Problem: Patient Education: Go to Patient Education Activity  Goal: Patient/Family Education  Outcome: Progressing Towards Goal     Problem: Risk for Spread of Infection  Goal: Prevent transmission of infectious organism to others  Description: Prevent the transmission of infectious organisms to other patients, staff members, and visitors.   Outcome: Progressing Towards Goal     Problem: Patient Education:  Go to Education Activity  Goal: Patient/Family Education  Outcome: Progressing Towards Goal     Problem: Falls - Risk of  Goal: *Absence of Falls  Description: Document Joannamanuel Hathaway Fall Risk and appropriate interventions in the flowsheet. Outcome: Progressing Towards Goal  Note: Fall Risk Interventions:            Medication Interventions: Bed/chair exit alarm, Evaluate medications/consider consulting pharmacy, Patient to call before getting OOB, Teach patient to arise slowly         History of Falls Interventions: Bed/chair exit alarm         Problem: Patient Education: Go to Patient Education Activity  Goal: Patient/Family Education  Outcome: Progressing Towards Goal     Problem: Pain  Goal: *Control of Pain  Outcome: Progressing Towards Goal     Problem: Pain  Goal: *PALLIATIVE CARE:  Alleviation of Pain  Outcome: Progressing Towards Goal     Problem: Patient Education: Go to Patient Education Activity  Goal: Patient/Family Education  Outcome: Progressing Towards Goal     Problem: Pressure Injury - Risk of  Goal: *Prevention of pressure injury  Description: Document Dnan Scale and appropriate interventions in the flowsheet. Outcome: Progressing Towards Goal  Note: Pressure Injury Interventions: Activity Interventions: Pressure redistribution bed/mattress(bed type)    Mobility Interventions: Pressure redistribution bed/mattress (bed type)    Nutrition Interventions: Document food/fluid/supplement intake                     Problem: Pressure Injury - Risk of  Goal: *Prevention of pressure injury  Description: Document Dann Scale and appropriate interventions in the flowsheet. Outcome: Progressing Towards Goal  Note: Pressure Injury Interventions:             Activity Interventions: Pressure redistribution bed/mattress(bed type)    Mobility Interventions: Pressure redistribution bed/mattress (bed type)    Nutrition Interventions: Document food/fluid/supplement intake                     Problem: Patient Education: Go to Patient Education Activity  Goal: Patient/Family Education  Outcome: Progressing Towards Goal     Problem: Airway Clearance - Ineffective  Goal: Achieve or maintain patent airway  Outcome: Progressing Towards Goal     Problem: Gas Exchange - Impaired  Goal: Absence of hypoxia  Outcome: Progressing Towards Goal     Problem: Gas Exchange - Impaired  Goal: Promote optimal lung function  Outcome: Progressing Towards Goal     Problem: Gas Exchange - Impaired  Goal: Promote optimal lung function  Outcome: Progressing Towards Goal     Problem: Breathing Pattern - Ineffective  Goal: Ability to achieve and maintain a regular respiratory rate  Outcome: Progressing Towards Goal     Problem: Body Temperature -  Risk of, Imbalanced  Goal: Ability to maintain a body temperature within defined limits  Outcome: Progressing Towards Goal     Problem: Body Temperature -  Risk of, Imbalanced  Goal: Will regain or maintain usual level of consciousness  Outcome: Progressing Towards Goal     Problem:  Body Temperature -  Risk of, Imbalanced  Goal: Complications related to the disease process, condition or treatment will be avoided or minimized  Outcome: Progressing Towards Goal     Problem: Isolation Precautions - Risk of Spread of Infection  Goal: Prevent transmission of infectious organism to others  Outcome: Progressing Towards Goal     Problem: Risk for Fluid Volume Deficit  Goal: Maintain normal heart rhythm  Outcome: Progressing Towards Goal     Problem: Risk for Fluid Volume Deficit  Goal: Maintain absence of muscle cramping  Outcome: Progressing Towards Goal     Problem: Risk for Fluid Volume Deficit  Goal: Maintain normal serum potassium, sodium, calcium, phosphorus, and pH  Outcome: Progressing Towards Goal     Problem: Loneliness or Risk for Loneliness  Goal: Demonstrate positive use of time alone when socialization is not possible  Outcome: Progressing Towards Goal Problem: Fatigue  Goal: Verbalize increase energy and improved vitality  Outcome: Progressing Towards Goal     Problem: Patient Education: Go to Patient Education Activity  Goal: Patient/Family Education  Outcome: Progressing Towards Goal

## 2021-12-12 LAB
ANION GAP SERPL CALC-SCNC: 3 MMOL/L (ref 3–18)
BUN SERPL-MCNC: 31 MG/DL (ref 7–18)
BUN/CREAT SERPL: 27 (ref 12–20)
CALCIUM SERPL-MCNC: 8.4 MG/DL (ref 8.5–10.1)
CHLORIDE SERPL-SCNC: 102 MMOL/L (ref 100–111)
CO2 SERPL-SCNC: 29 MMOL/L (ref 21–32)
CREAT SERPL-MCNC: 1.15 MG/DL (ref 0.6–1.3)
GLUCOSE SERPL-MCNC: 91 MG/DL (ref 74–99)
POTASSIUM SERPL-SCNC: 4.2 MMOL/L (ref 3.5–5.5)
SODIUM SERPL-SCNC: 134 MMOL/L (ref 136–145)

## 2021-12-12 PROCEDURE — 65660000000 HC RM CCU STEPDOWN

## 2021-12-12 PROCEDURE — 94762 N-INVAS EAR/PLS OXIMTRY CONT: CPT

## 2021-12-12 PROCEDURE — 74011250636 HC RX REV CODE- 250/636: Performed by: INTERNAL MEDICINE

## 2021-12-12 PROCEDURE — 99232 SBSQ HOSP IP/OBS MODERATE 35: CPT | Performed by: FAMILY MEDICINE

## 2021-12-12 PROCEDURE — 80048 BASIC METABOLIC PNL TOTAL CA: CPT

## 2021-12-12 PROCEDURE — 77010033711 HC HIGH FLOW OXYGEN

## 2021-12-12 PROCEDURE — 74011250637 HC RX REV CODE- 250/637: Performed by: FAMILY MEDICINE

## 2021-12-12 PROCEDURE — 74011250637 HC RX REV CODE- 250/637: Performed by: INTERNAL MEDICINE

## 2021-12-12 PROCEDURE — 36415 COLL VENOUS BLD VENIPUNCTURE: CPT

## 2021-12-12 RX ADMIN — Medication 10 ML: at 22:26

## 2021-12-12 RX ADMIN — FAMOTIDINE 20 MG: 20 TABLET ORAL at 08:49

## 2021-12-12 RX ADMIN — FAMOTIDINE 20 MG: 20 TABLET ORAL at 17:46

## 2021-12-12 RX ADMIN — DOCUSATE SODIUM 100 MG: 100 CAPSULE ORAL at 08:49

## 2021-12-12 RX ADMIN — Medication 10 ML: at 14:37

## 2021-12-12 RX ADMIN — ENOXAPARIN SODIUM 40 MG: 100 INJECTION SUBCUTANEOUS at 08:49

## 2021-12-12 NOTE — ROUTINE PROCESS
Bedside and Verbal shift change report given to Babs Heller RN (oncoming nurse) by Lima Salcedo RN(offgoing nurse). Report included the following information SBAR, Kardex, Recent Results and Cardiac Rhythm NSR. Remains on high flow; settings unchanged.

## 2021-12-12 NOTE — PROGRESS NOTES
Progress Note         Patient: Spencer Thomas MRN: [id number]  CSN: [id number]    YOB: 1970  Age: 45 y.o. Sex: male    DOA: 12/1/2021 LOS:  LOS: 11 days                    Subjective:     Spencer Thomas is a 45 y.o. male with a PMHx of nephrolithiasis who is admitted for acute hypoxic respiratory failure secondary to severe Covid19 pneumonia. No significant change in patient condition today    Seen earlier in room  Sitting up in bed, NAD  Shortness of breath improved  Has significant KYLE with any exertion  Cough improved, appetite is good    His wife is also in the hospital with Covid    Hypoxia improved  SPO2 in the mid 90s on 20 L/min, 35% O2      Objective:     Physical Exam:  Visit Vitals  /66 (BP 1 Location: Right upper arm, BP Patient Position: Sitting)   Pulse 97   Temp 98.2 °F (36.8 °C)   Resp 19   Ht 5' 9\" (1.753 m)   Wt 78.5 kg (173 lb 1.6 oz)   SpO2 96%   BMI 25.56 kg/m²        General:         Alert, cooperative, no acute distress    HEENT: NC, Atraumatic. Anicteric sclerae. Lungs: CTA Bilaterally. No Wheezing/Rhonchi/Rales. Heart:  Regular  rhythm,  No murmur, No Rubs, No Gallops  Abdomen: Soft, Non distended, Non tender. +Bowel sounds, no HSM  Extremities: No c/c/e  Psych:   Not anxious or agitated. Neurologic:  Alert and oriented X 3. No acute neurological deficits. Intake and Output:  Current Shift:  12/12 0701 - 12/12 1900  In: 1080 [P.O.:1080]  Out: 350 [Urine:350]  Last three shifts:  No intake/output data recorded.     Labs: Results:       Chemistry Recent Labs     12/12/21  0158 12/11/21  0450 12/10/21  0552   GLU 91 113* 126*   * 136 136   K 4.2 4.5 5.0    103 102   CO2 29 24 28   BUN 31* 29* 29*   CREA 1.15 1.09 1.10   CA 8.4* 8.8 8.7   AGAP 3 9 6   BUCR 27* 27* 26*   AP  --   --  71   TP  --   --  5.9*   ALB  --   --  2.5*   GLOB  --   --  3.4   AGRAT  --   --  0.7*      CBC w/Diff Recent Labs     12/10/21  0552   WBC 8.4   RBC 5.29   HGB 14.7   HCT 45.6      GRANS 88*   LYMPH 5*   EOS 0      Cardiac Enzymes No results for input(s): CPK, CKND1, SABINA in the last 72 hours. No lab exists for component: CKRMB, TROIP   Coagulation No results for input(s): PTP, INR, APTT, INREXT, INREXT in the last 72 hours. Lipid Panel No results found for: CHOL, CHOLPOCT, CHOLX, CHLST, CHOLV, 050155, HDL, HDLP, LDL, LDLC, DLDLP, 168286, VLDLC, VLDL, TGLX, TRIGL, TRIGP, TGLPOCT, CHHD, CHHDX   BNP No results for input(s): BNPP in the last 72 hours. Liver Enzymes Recent Labs     12/10/21  0552   TP 5.9*   ALB 2.5*   AP 71      Thyroid Studies No results found for: T4, T3U, TSH, TSHEXT, TSHEXT             Assessment and Plan:     Vimal Ewing is a 45 y.o. male with a PMHx of nephrolithiasis who is admitted for acute hypoxic respiratory failure secondary to severe Covid19 pneumonia. 1. Acute hypoxic respiratory failure  2. Severe Covid19 pneumonia  3. Constipationresolved    ID following  BUE and BLE PVLs showed no DVT  Continue O2 as needed and wean as tolerated  Has completed course of IV steroids  Follow-up inflammatory markers and BMP  Incentive spirometry  Droplet plus precautions      Case discussed with:  [x]Patient  []Family  [x]Nursing  []Case Management  DVT prophylaxis: Lovenox  Diet: Regular  Contact: Zeinab Mckay (wife)      [phone]  Code Status: Full  Disposition: Continue current care, greater than 2 nights       H. Vicente Michelle DO  12/12/2021       Dragon medical dictation software was used for portions of this report. Unintended errors may occur.

## 2021-12-12 NOTE — PROGRESS NOTES
Problem: Risk for Spread of Infection  Goal: Prevent transmission of infectious organism to others  Description: Prevent the transmission of infectious organisms to other patients, staff members, and visitors. Outcome: Progressing Towards Goal     Problem: Patient Education:  Go to Education Activity  Goal: Patient/Family Education  Outcome: Progressing Towards Goal     Problem: Falls - Risk of  Goal: *Absence of Falls  Description: Document Jason Led Fall Risk and appropriate interventions in the flowsheet. Outcome: Progressing Towards Goal  Note: Fall Risk Interventions:            Medication Interventions: Bed/chair exit alarm, Patient to call before getting OOB, Teach patient to arise slowly         History of Falls Interventions: Bed/chair exit alarm, Door open when patient unattended         Problem: Patient Education: Go to Patient Education Activity  Goal: Patient/Family Education  Outcome: Progressing Towards Goal     Problem: Pain  Goal: *Control of Pain  Outcome: Progressing Towards Goal  Goal: *PALLIATIVE CARE:  Alleviation of Pain  Outcome: Progressing Towards Goal     Problem: Patient Education: Go to Patient Education Activity  Goal: Patient/Family Education  Outcome: Progressing Towards Goal     Problem: Pressure Injury - Risk of  Goal: *Prevention of pressure injury  Description: Document Dann Scale and appropriate interventions in the flowsheet. Outcome: Progressing Towards Goal  Note: Pressure Injury Interventions:             Activity Interventions: Increase time out of bed, Pressure redistribution bed/mattress(bed type)    Mobility Interventions: HOB 30 degrees or less, Pressure redistribution bed/mattress (bed type)    Nutrition Interventions: Document food/fluid/supplement intake                     Problem: Patient Education: Go to Patient Education Activity  Goal: Patient/Family Education  Outcome: Progressing Towards Goal     Problem: Airway Clearance - Ineffective  Goal: Achieve or maintain patent airway  Outcome: Progressing Towards Goal     Problem: Gas Exchange - Impaired  Goal: Absence of hypoxia  Outcome: Progressing Towards Goal  Goal: Promote optimal lung function  Outcome: Progressing Towards Goal     Problem: Breathing Pattern - Ineffective  Goal: Ability to achieve and maintain a regular respiratory rate  Outcome: Progressing Towards Goal     Problem:  Body Temperature -  Risk of, Imbalanced  Goal: Ability to maintain a body temperature within defined limits  Outcome: Progressing Towards Goal  Goal: Will regain or maintain usual level of consciousness  Outcome: Progressing Towards Goal  Goal: Complications related to the disease process, condition or treatment will be avoided or minimized  Outcome: Progressing Towards Goal     Problem: Isolation Precautions - Risk of Spread of Infection  Goal: Prevent transmission of infectious organism to others  Outcome: Progressing Towards Goal     Problem: Nutrition Deficits  Goal: Optimize nutrtional status  Outcome: Progressing Towards Goal     Problem: Risk for Fluid Volume Deficit  Goal: Maintain normal heart rhythm  Outcome: Progressing Towards Goal  Goal: Maintain absence of muscle cramping  Outcome: Progressing Towards Goal  Goal: Maintain normal serum potassium, sodium, calcium, phosphorus, and pH  Outcome: Progressing Towards Goal     Problem: Loneliness or Risk for Loneliness  Goal: Demonstrate positive use of time alone when socialization is not possible  Outcome: Progressing Towards Goal     Problem: Fatigue  Goal: Verbalize increase energy and improved vitality  Outcome: Progressing Towards Goal     Problem: Patient Education: Go to Patient Education Activity  Goal: Patient/Family Education  Outcome: Progressing Towards Goal     Problem: Risk for Spread of Infection  Goal: Prevent transmission of infectious organism to others  Description: Prevent the transmission of infectious organisms to other patients, staff members, and visitors. Outcome: Progressing Towards Goal     Problem: Patient Education:  Go to Education Activity  Goal: Patient/Family Education  Outcome: Progressing Towards Goal     Problem: Falls - Risk of  Goal: *Absence of Falls  Description: Document Kylah Downs Fall Risk and appropriate interventions in the flowsheet. Outcome: Progressing Towards Goal  Note: Fall Risk Interventions:            Medication Interventions: Bed/chair exit alarm, Patient to call before getting OOB, Teach patient to arise slowly         History of Falls Interventions: Bed/chair exit alarm, Door open when patient unattended         Problem: Patient Education: Go to Patient Education Activity  Goal: Patient/Family Education  Outcome: Progressing Towards Goal     Problem: Pain  Goal: *Control of Pain  Outcome: Progressing Towards Goal     Problem: Pain  Goal: *PALLIATIVE CARE:  Alleviation of Pain  Outcome: Progressing Towards Goal     Problem: Patient Education: Go to Patient Education Activity  Goal: Patient/Family Education  Outcome: Progressing Towards Goal     Problem: Pressure Injury - Risk of  Goal: *Prevention of pressure injury  Description: Document Dann Scale and appropriate interventions in the flowsheet. Outcome: Progressing Towards Goal  Note: Pressure Injury Interventions:             Activity Interventions: Increase time out of bed, Pressure redistribution bed/mattress(bed type)    Mobility Interventions: HOB 30 degrees or less, Pressure redistribution bed/mattress (bed type)    Nutrition Interventions: Document food/fluid/supplement intake                     Problem: Patient Education: Go to Patient Education Activity  Goal: Patient/Family Education  Outcome: Progressing Towards Goal     Problem: Airway Clearance - Ineffective  Goal: Achieve or maintain patent airway  Outcome: Progressing Towards Goal     Problem: Gas Exchange - Impaired  Goal: Absence of hypoxia  Outcome: Progressing Towards Goal     Problem: Gas Exchange - Impaired  Goal: Promote optimal lung function  Outcome: Progressing Towards Goal     Problem: Breathing Pattern - Ineffective  Goal: Ability to achieve and maintain a regular respiratory rate  Outcome: Progressing Towards Goal     Problem: Body Temperature -  Risk of, Imbalanced  Goal: Ability to maintain a body temperature within defined limits  Outcome: Progressing Towards Goal     Problem: Body Temperature -  Risk of, Imbalanced  Goal: Will regain or maintain usual level of consciousness  Outcome: Progressing Towards Goal     Problem:  Body Temperature -  Risk of, Imbalanced  Goal: Complications related to the disease process, condition or treatment will be avoided or minimized  Outcome: Progressing Towards Goal     Problem: Isolation Precautions - Risk of Spread of Infection  Goal: Prevent transmission of infectious organism to others  Outcome: Progressing Towards Goal     Problem: Nutrition Deficits  Goal: Optimize nutrtional status  Outcome: Progressing Towards Goal     Problem: Risk for Fluid Volume Deficit  Goal: Maintain normal heart rhythm  Outcome: Progressing Towards Goal     Problem: Risk for Fluid Volume Deficit  Goal: Maintain absence of muscle cramping  Outcome: Progressing Towards Goal     Problem: Risk for Fluid Volume Deficit  Goal: Maintain normal serum potassium, sodium, calcium, phosphorus, and pH  Outcome: Progressing Towards Goal     Problem: Loneliness or Risk for Loneliness  Goal: Demonstrate positive use of time alone when socialization is not possible  Outcome: Progressing Towards Goal     Problem: Fatigue  Goal: Verbalize increase energy and improved vitality  Outcome: Progressing Towards Goal     Problem: Patient Education: Go to Patient Education Activity  Goal: Patient/Family Education  Outcome: Progressing Towards Goal

## 2021-12-13 LAB
ANION GAP SERPL CALC-SCNC: 4 MMOL/L (ref 3–18)
BUN SERPL-MCNC: 33 MG/DL (ref 7–18)
BUN/CREAT SERPL: 27 (ref 12–20)
CALCIUM SERPL-MCNC: 8.5 MG/DL (ref 8.5–10.1)
CHLORIDE SERPL-SCNC: 103 MMOL/L (ref 100–111)
CO2 SERPL-SCNC: 30 MMOL/L (ref 21–32)
CREAT SERPL-MCNC: 1.22 MG/DL (ref 0.6–1.3)
CRP SERPL-MCNC: <0.3 MG/DL (ref 0–0.3)
GLUCOSE SERPL-MCNC: 92 MG/DL (ref 74–99)
POTASSIUM SERPL-SCNC: 4.1 MMOL/L (ref 3.5–5.5)
PROCALCITONIN SERPL-MCNC: <0.05 NG/ML
SODIUM SERPL-SCNC: 137 MMOL/L (ref 136–145)

## 2021-12-13 PROCEDURE — 2709999900 HC NON-CHARGEABLE SUPPLY

## 2021-12-13 PROCEDURE — 65660000000 HC RM CCU STEPDOWN

## 2021-12-13 PROCEDURE — 74011250636 HC RX REV CODE- 250/636: Performed by: INTERNAL MEDICINE

## 2021-12-13 PROCEDURE — 74011250637 HC RX REV CODE- 250/637: Performed by: FAMILY MEDICINE

## 2021-12-13 PROCEDURE — 86140 C-REACTIVE PROTEIN: CPT

## 2021-12-13 PROCEDURE — 74011250637 HC RX REV CODE- 250/637: Performed by: INTERNAL MEDICINE

## 2021-12-13 PROCEDURE — 84145 PROCALCITONIN (PCT): CPT

## 2021-12-13 PROCEDURE — 77010033711 HC HIGH FLOW OXYGEN

## 2021-12-13 PROCEDURE — 99232 SBSQ HOSP IP/OBS MODERATE 35: CPT | Performed by: HOSPITALIST

## 2021-12-13 PROCEDURE — 80048 BASIC METABOLIC PNL TOTAL CA: CPT

## 2021-12-13 PROCEDURE — 36415 COLL VENOUS BLD VENIPUNCTURE: CPT

## 2021-12-13 PROCEDURE — 94762 N-INVAS EAR/PLS OXIMTRY CONT: CPT

## 2021-12-13 RX ADMIN — FAMOTIDINE 20 MG: 20 TABLET ORAL at 10:32

## 2021-12-13 RX ADMIN — Medication 10 ML: at 06:32

## 2021-12-13 RX ADMIN — DOCUSATE SODIUM 100 MG: 100 CAPSULE ORAL at 10:32

## 2021-12-13 RX ADMIN — ENOXAPARIN SODIUM 40 MG: 100 INJECTION SUBCUTANEOUS at 10:32

## 2021-12-13 RX ADMIN — FAMOTIDINE 20 MG: 20 TABLET ORAL at 18:41

## 2021-12-13 RX ADMIN — Medication 10 ML: at 15:28

## 2021-12-13 NOTE — ROUTINE PROCESS
Bedside and Verbal shift change report given to Sophia Stevenson RN(oncoming nurse) by Karolina Lim RN(offgoing nurse). Report included the following information SBAR, Kardex and Cardiac Rhythm NSR.

## 2021-12-13 NOTE — PROGRESS NOTES
Discharge planning    Reviewed chart. Patient remains on 20 Liters HFNC. Will need to be assessed for home oxygen and PCP set up prior to discharge. CM will continue to monitor and assist with transition of care needs.        LORENZO Brownlee, RN  Pager # [phone]  Care Manager

## 2021-12-13 NOTE — ROUTINE PROCESS
0700-Bedside and Verbal shift change report given to María (oncoming nurse) by Victor M Curiel RN (offgoing nurse). Report included the following information SBAR, Kardex and MAR.     1900-Bedside and Verbal shift change report given to Susanne RN (oncoming nurse) by Mango Snider RN (offgoing nurse). Report included the following information SBAR, Kardex and MAR.

## 2021-12-13 NOTE — PROGRESS NOTES
Progress Note         Patient: Armen Muir MRN: [id number]  CSN: [id number]    YOB: 1970  Age: 45 y.o. Sex: male    DOA: 12/1/2021 LOS:  LOS: 12 days                    Subjective:     Armen Muir is a 45 y.o. male with a PMHx of nephrolithiasis who is admitted for acute hypoxic respiratory failure secondary to severe Covid19 pneumonia. Patient feeling better, fe denies any new complaints. Patient is currently on high flow but has been decreased to 12 L  Patient slightly desatted to 88% on exertion. Objective:     Physical Exam:  Visit Vitals  /73   Pulse 96   Temp 97.5 °F (36.4 °C)   Resp 25   Ht 5' 9\" (1.753 m)   Wt 77.5 kg (170 lb 12.8 oz)   SpO2 96%   BMI 25.22 kg/m²        General:         Alert, cooperative, no acute distress    Lungs: CTA Bilaterally. No Wheezing/Rales. Heart:  Regular  rhythm,  No murmur, No Rubs, No Gallops  Abdomen: Soft, Non distended, Non tender. +Bowel sounds, no HSM  Extremities: No c/c/e  Psych:   Not anxious or agitated. Neurologic:  Alert and oriented X 3. No acute neurological deficits. Intake and Output:  Current Shift:  12/13 0701 - 12/13 1900  In: -   Out: 625 [Urine:625]  Last three shifts:  12/11 1901 - 12/13 0700  In: 1080 [P.O.:1080]  Out: 800 [Urine:800]    Labs: Results:       Chemistry Recent Labs     12/13/21  0149 12/12/21  0158 12/11/21  0450   GLU 92 91 113*    134* 136   K 4.1 4.2 4.5    102 103   CO2 30 29 24   BUN 33* 31* 29*   CREA 1.22 1.15 1.09   CA 8.5 8.4* 8.8   AGAP 4 3 9   BUCR 27* 27* 27*      CBC w/Diff No results for input(s): WBC, RBC, HGB, HCT, PLT, GRANS, LYMPH, EOS, HGBEXT, HCTEXT, PLTEXT, HGBEXT, HCTEXT, PLTEXT in the last 72 hours. Cardiac Enzymes No results for input(s): CPK, CKND1, SABINA in the last 72 hours. No lab exists for component: CKRMB, TROIP   Coagulation No results for input(s): PTP, INR, APTT, INREXT, INREXT in the last 72 hours.     Lipid Panel No results found for: CHOL, CHOLPOCT, CHOLX, CHLST, CHOLV, 136175, HDL, HDLP, LDL, LDLC, DLDLP, 979679, VLDLC, VLDL, TGLX, TRIGL, TRIGP, TGLPOCT, CHHD, CHHDX   BNP No results for input(s): BNPP in the last 72 hours. Liver Enzymes No results for input(s): TP, ALB, TBIL, AP in the last 72 hours. No lab exists for component: SGOT, GPT, DBIL   Thyroid Studies No results found for: T4, T3U, TSH, TSHEXT, TSHEXT             Assessment and Plan:     David Barroso is a 45 y.o. male with a PMHx of nephrolithiasis who is admitted for acute hypoxic respiratory failure secondary to severe Covid19 pneumonia. 1. Acute hypoxic respiratory failure  2. Severe Covid19 pneumonia  3. Constipationresolved    Plan  Continue high flow O2, wean as tolerated  Continue O2 as needed and wean as tolerated  status post IV steroids  Follow-up inflammatory markers and BMP  Incentive spirometry  Droplet plus precautions    Discussed with RN at the bedside    Discussed with the patient at the bedside and explained in detail about my above plan of care. Offered to talk to family, patient states he will discuss with his family and update them. Case discussed with:  [x]Patient  []Family  [x]Nursing  []Case Management  DVT prophylaxis: Lovenox  Diet: Regular  Contact: Beto Combs (wife)      [phone]  Code Status: Full  Disposition: Continue current care, greater than 2 nights       Odessa Cool MD  12/13/2021 5:28 PM        Dragon medical dictation software was used for portions of this report. Unintended errors may occur.

## 2021-12-13 NOTE — PROGRESS NOTES
Infectious Disease progress Note        Reason: Acute hypoxic respiratory failure, confirmed COVID-19 pneumonia    Current abx Prior abx     Ceftriaxone 12/1-12/6  azithromycin since 12/1/2021-12/7/21     Lines:       Assessment :    27-year-old man with no significant past medical history admitted to [facility] on 12/1/2021 with increasing shortness of breath for 2 to 3 days. Symptoms of COVID-19 since 11/22/2021  Positive Covid test 11/26/2021    Sudden worsening hypoxia with oxygen saturation in 70s on presentation  Rapidly worsening oxygenation now on high flow oxygen at 40 L, tachypnea, elevated ferritin- 7000s, crp: 7.8    Clinical presentation consistent with acute hypoxic respiratory failure-present on admission due to severe COVID-19 pneumonia    S/p high dose decadron, tocilizumab on 12/1/21    Mild elevated procalcitonin-could be secondary to severe COVID-19 pneumonia. Will monitor for superimposed bacterial infection    Mild elevated Z-dsmom-oslkke due to severe COVID-19 pneumonia. No evidence of pulmonary embolism noted on CTA chest 12/1/2021. No evidence of DVT noted on venous duplex bilateral extremity 12/8/2021      Gradually improving hypoxia. Cough with yellowish sputum. R/o superimposed bacterial pneumonia. Sputum culture 12/7-normal respiratory kinsey, pseudomonas    Currently patient is improving clinically off antibiotics. Low procalcitonin. Improved cough. Pseudomonas in sputum culture could be colonizer. Hence we will continue monitoring off antibiotics        Recommendations:    1. Hold further steroids  2.   initiate antibiotics against pseudomonas if clinical evidence of bacterial infection noted on future exam  3. Monitor CRP, D-dimer, procalcitonin  4. Titrate oxygen as tolerated  5. Prophylactic anticoagulation per primary team  6. Avoid constipation    We will follow peripherally. Please call if any new questions or concerns.   Thanks     Above plan was discussed in details with patient, dr. Carmen Carbajal. HPI:    Feels better. Patient denies any pleuritic chest pain. Resolved constipation. There are no discharge medications for this patient. Current Facility-Administered Medications   Medication Dose Route Frequency    docusate sodium (COLACE) capsule 100 mg  100 mg Oral DAILY    sodium chloride (NS) flush 5-40 mL  5-40 mL IntraVENous Q8H    sodium chloride (NS) flush 5-40 mL  5-40 mL IntraVENous PRN    polyethylene glycol (MIRALAX) packet 17 g  17 g Oral DAILY PRN    ondansetron (ZOFRAN ODT) tablet 4 mg  4 mg Oral Q8H PRN    Or    ondansetron (ZOFRAN) injection 4 mg  4 mg IntraVENous Q6H PRN    enoxaparin (LOVENOX) injection 40 mg  40 mg SubCUTAneous DAILY    famotidine (PEPCID) tablet 20 mg  20 mg Oral BID    acetaminophen (TYLENOL) tablet 650 mg  650 mg Oral Q6H PRN    Or    acetaminophen (TYLENOL) suppository 650 mg  650 mg Rectal Q6H PRN    albuterol-ipratropium (DUO-NEB) 2.5 MG-0.5 MG/3 ML  3 mL Nebulization Q4H PRN       Allergies: Codeine    History reviewed. No pertinent family history.   Social History     Socioeconomic History    Marital status:      Spouse name: Not on file    Number of children: Not on file    Years of education: Not on file    Highest education level: Not on file   Occupational History    Not on file   Tobacco Use    Smoking status: Never Smoker    Smokeless tobacco: Never Used   Substance and Sexual Activity    Alcohol use: No    Drug use: Not Currently     Types: Marijuana, Cocaine     Comment: Stopped using when he was 23years old    Sexual activity: Yes   Other Topics Concern    Not on file   Social History Narrative    Not on file     Social Determinants of Health     Financial Resource Strain:     Difficulty of Paying Living Expenses: Not on file   Food Insecurity:     Worried About Running Out of Food in the Last Year: Not on file    Lindsay of Food in the Last Year: Not on file   Transportation Needs:     Lack of Transportation (Medical): Not on file    Lack of Transportation (Non-Medical): Not on file   Physical Activity:     Days of Exercise per Week: Not on file    Minutes of Exercise per Session: Not on file   Stress:     Feeling of Stress : Not on file   Social Connections:     Frequency of Communication with Friends and Family: Not on file    Frequency of Social Gatherings with Friends and Family: Not on file    Attends [employer] Services: Not on file    Active Member of [address] [place] [address] or Organizations: Not on file    Attends Club or Organization Meetings: Not on file    Marital Status: Not on file   Intimate Partner Violence:     Fear of Current or Ex-Partner: Not on file    Emotionally Abused: Not on file    Physically Abused: Not on file    Sexually Abused: Not on file   Housing Stability:     Unable to Pay for Housing in the Last Year: Not on file    Number of [place] in the Last Year: Not on file    Unstable Housing in the Last Year: Not on file     Social History     Tobacco Use   Smoking Status Never Smoker   Smokeless Tobacco Never Used        Temp (24hrs), Av.7 °F (36.5 °C), Min:97.5 °F (36.4 °C), Max:98 °F (36.7 °C)    Visit Vitals  /70 (BP 1 Location: Right upper arm, BP Patient Position: At rest)   Pulse 98   Temp 97.6 °F (36.4 °C)   Resp 20   Ht 5' 9\" (1.753 m)   Wt 77.5 kg (170 lb 12.8 oz)   SpO2 93%   BMI 25.22 kg/m²       ROS: 12 point ROS obtained in details. Pertinent positives as mentioned in HPI,   otherwise negative    Physical Exam:    Vitals signs and nursing note reviewed. Sitting on bed. Appears tachypneic      HENT:      Head: Normocephalic. Eyes:      Conjunctiva/sclera: Conjunctivae normal.      Neck:      Musculoskeletal: Normal range of motion and neck supple.    Cardiovascular:      Rate and Rhythm: Normal rate and regular rhythm on monitor  Chest:      Bilateral chest movements equal.  Auscultation deferred due to Covid positive  Abdominal: General: There is no distension. Palpations: Abdomen is soft. Tenderness: There is no abdominal tenderness. There is no rebound. Musculoskeletal: Normal range of motion. General: No tenderness. Skin:     General: Skin is warm and dry. Findings: No rash. Neurological:      Mental Status: He is alert and oriented to person, place, and time. Cranial Nerves: No cranial nerve deficit. Motor: No abnormal muscle tone. Coordination: Coordination normal.   Psychiatric:         Behavior: Behavior normal.         Thought Content: Thought content normal.         Judgment: Judgment normal.       Labs: Results:   Chemistry Recent Labs     12/13/21  0149 12/12/21  0158 12/11/21  0450   GLU 92 91 113*    134* 136   K 4.1 4.2 4.5    102 103   CO2 30 29 24   BUN 33* 31* 29*   CREA 1.22 1.15 1.09   CA 8.5 8.4* 8.8   AGAP 4 3 9   BUCR 27* 27* 27*      CBC w/Diff No results for input(s): WBC, RBC, HGB, HCT, PLT, GRANS, LYMPH, EOS, HGBEXT, HCTEXT, PLTEXT, HGBEXT, HCTEXT, PLTEXT in the last 72 hours. Microbiology No results for input(s): CULT in the last 72 hours. RADIOLOGY:    All available imaging studies/reports in [facility] for this admission were reviewed        Disclaimer: Sections of this note are dictated utilizing voice recognition software, which may have resulted in some phonetic based errors in grammar and contents. Even though attempts were made to correct all the mistakes, some may have been missed, and remained in the body of the document. If questions arise, please contact our department.     Dr. Jayesh Hampton, Infectious Disease Specialist  [phone]  December 13, 2021  11:26 AM

## 2021-12-14 LAB
ANION GAP SERPL CALC-SCNC: 6 MMOL/L (ref 3–18)
BASOPHILS # BLD: 0 K/UL (ref 0–0.1)
BASOPHILS NFR BLD: 1 % (ref 0–2)
BUN SERPL-MCNC: 32 MG/DL (ref 7–18)
BUN/CREAT SERPL: 29 (ref 12–20)
CALCIUM SERPL-MCNC: 8.4 MG/DL (ref 8.5–10.1)
CHLORIDE SERPL-SCNC: 104 MMOL/L (ref 100–111)
CO2 SERPL-SCNC: 27 MMOL/L (ref 21–32)
CREAT SERPL-MCNC: 1.09 MG/DL (ref 0.6–1.3)
DIFFERENTIAL METHOD BLD: ABNORMAL
EOSINOPHIL # BLD: 0.1 K/UL (ref 0–0.4)
EOSINOPHIL NFR BLD: 1 % (ref 0–5)
ERYTHROCYTE [DISTWIDTH] IN BLOOD BY AUTOMATED COUNT: 13.2 % (ref 11.6–14.5)
GLUCOSE SERPL-MCNC: 86 MG/DL (ref 74–99)
HCT VFR BLD AUTO: 44.5 % (ref 36–48)
HGB BLD-MCNC: 13.9 G/DL (ref 13–16)
IMM GRANULOCYTES # BLD AUTO: 0.1 K/UL (ref 0–0.04)
IMM GRANULOCYTES NFR BLD AUTO: 2 % (ref 0–0.5)
LYMPHOCYTES # BLD: 0.9 K/UL (ref 0.9–3.6)
LYMPHOCYTES NFR BLD: 21 % (ref 21–52)
MAGNESIUM SERPL-MCNC: 2.6 MG/DL (ref 1.6–2.6)
MCH RBC QN AUTO: 27.9 PG (ref 24–34)
MCHC RBC AUTO-ENTMCNC: 31.2 G/DL (ref 31–37)
MCV RBC AUTO: 89.4 FL (ref 78–100)
MONOCYTES # BLD: 0.7 K/UL (ref 0.05–1.2)
MONOCYTES NFR BLD: 16 % (ref 3–10)
NEUTS SEG # BLD: 2.4 K/UL (ref 1.8–8)
NEUTS SEG NFR BLD: 59 % (ref 40–73)
NRBC # BLD: 0 K/UL (ref 0–0.01)
NRBC BLD-RTO: 0 PER 100 WBC
PHOSPHATE SERPL-MCNC: 4.2 MG/DL (ref 2.5–4.9)
PLATELET # BLD AUTO: 264 K/UL (ref 135–420)
PMV BLD AUTO: 10.5 FL (ref 9.2–11.8)
POTASSIUM SERPL-SCNC: 4.5 MMOL/L (ref 3.5–5.5)
RBC # BLD AUTO: 4.98 M/UL (ref 4.35–5.65)
SODIUM SERPL-SCNC: 137 MMOL/L (ref 136–145)
WBC # BLD AUTO: 4.1 K/UL (ref 4.6–13.2)

## 2021-12-14 PROCEDURE — 74011250636 HC RX REV CODE- 250/636: Performed by: INTERNAL MEDICINE

## 2021-12-14 PROCEDURE — 85025 COMPLETE CBC W/AUTO DIFF WBC: CPT

## 2021-12-14 PROCEDURE — 84100 ASSAY OF PHOSPHORUS: CPT

## 2021-12-14 PROCEDURE — 65660000000 HC RM CCU STEPDOWN

## 2021-12-14 PROCEDURE — 74011250637 HC RX REV CODE- 250/637: Performed by: INTERNAL MEDICINE

## 2021-12-14 PROCEDURE — 74011250637 HC RX REV CODE- 250/637: Performed by: HOSPITALIST

## 2021-12-14 PROCEDURE — 80048 BASIC METABOLIC PNL TOTAL CA: CPT

## 2021-12-14 PROCEDURE — 74011250637 HC RX REV CODE- 250/637: Performed by: FAMILY MEDICINE

## 2021-12-14 PROCEDURE — 36415 COLL VENOUS BLD VENIPUNCTURE: CPT

## 2021-12-14 PROCEDURE — 99232 SBSQ HOSP IP/OBS MODERATE 35: CPT | Performed by: HOSPITALIST

## 2021-12-14 PROCEDURE — 83735 ASSAY OF MAGNESIUM: CPT

## 2021-12-14 PROCEDURE — 77030027138 HC INCENT SPIROMETER -A

## 2021-12-14 RX ORDER — POLYETHYLENE GLYCOL 3350 17 G/17G
17 POWDER, FOR SOLUTION ORAL DAILY
Status: DISCONTINUED | OUTPATIENT
Start: 2021-12-14 | End: 2021-12-15 | Stop reason: HOSPADM

## 2021-12-14 RX ADMIN — POLYETHYLENE GLYCOL 3350 17 G: 17 POWDER, FOR SOLUTION ORAL at 15:04

## 2021-12-14 RX ADMIN — ENOXAPARIN SODIUM 40 MG: 100 INJECTION SUBCUTANEOUS at 09:22

## 2021-12-14 RX ADMIN — DOCUSATE SODIUM 100 MG: 100 CAPSULE ORAL at 09:21

## 2021-12-14 RX ADMIN — FAMOTIDINE 20 MG: 20 TABLET ORAL at 17:06

## 2021-12-14 RX ADMIN — FAMOTIDINE 20 MG: 20 TABLET ORAL at 09:21

## 2021-12-14 RX ADMIN — Medication 10 ML: at 13:38

## 2021-12-14 NOTE — PROGRESS NOTES
Problem: Risk for Spread of Infection  Goal: Prevent transmission of infectious organism to others  Description: Prevent the transmission of infectious organisms to other patients, staff members, and visitors. Outcome: Progressing Towards Goal     Problem: Patient Education:  Go to Education Activity  Goal: Patient/Family Education  Outcome: Progressing Towards Goal     Problem: Falls - Risk of  Goal: *Absence of Falls  Description: Document Delores López Fall Risk and appropriate interventions in the flowsheet. Outcome: Progressing Towards Goal  Note: Fall Risk Interventions:  Mobility Interventions: Bed/chair exit alarm         Medication Interventions: Bed/chair exit alarm         History of Falls Interventions: Bed/chair exit alarm         Problem: Patient Education: Go to Patient Education Activity  Goal: Patient/Family Education  Outcome: Progressing Towards Goal     Problem: Pain  Goal: *Control of Pain  Outcome: Progressing Towards Goal  Goal: *PALLIATIVE CARE:  Alleviation of Pain  Outcome: Progressing Towards Goal     Problem: Patient Education: Go to Patient Education Activity  Goal: Patient/Family Education  Outcome: Progressing Towards Goal     Problem: Pressure Injury - Risk of  Goal: *Prevention of pressure injury  Description: Document Dann Scale and appropriate interventions in the flowsheet. Outcome: Progressing Towards Goal  Note: Pressure Injury Interventions:             Activity Interventions: Assess need for specialty bed    Mobility Interventions: Pressure redistribution bed/mattress (bed type)    Nutrition Interventions: Document food/fluid/supplement intake                     Problem: Patient Education: Go to Patient Education Activity  Goal: Patient/Family Education  Outcome: Progressing Towards Goal     Problem: Airway Clearance - Ineffective  Goal: Achieve or maintain patent airway  Outcome: Progressing Towards Goal     Problem: Gas Exchange - Impaired  Goal: Absence of hypoxia  Outcome: Progressing Towards Goal  Goal: Promote optimal lung function  Outcome: Progressing Towards Goal     Problem: Breathing Pattern - Ineffective  Goal: Ability to achieve and maintain a regular respiratory rate  Outcome: Progressing Towards Goal     Problem:  Body Temperature -  Risk of, Imbalanced  Goal: Ability to maintain a body temperature within defined limits  Outcome: Progressing Towards Goal  Goal: Will regain or maintain usual level of consciousness  Outcome: Progressing Towards Goal  Goal: Complications related to the disease process, condition or treatment will be avoided or minimized  Outcome: Progressing Towards Goal     Problem: Isolation Precautions - Risk of Spread of Infection  Goal: Prevent transmission of infectious organism to others  Outcome: Progressing Towards Goal     Problem: Nutrition Deficits  Goal: Optimize nutrtional status  Outcome: Progressing Towards Goal     Problem: Risk for Fluid Volume Deficit  Goal: Maintain normal heart rhythm  Outcome: Progressing Towards Goal  Goal: Maintain absence of muscle cramping  Outcome: Progressing Towards Goal  Goal: Maintain normal serum potassium, sodium, calcium, phosphorus, and pH  Outcome: Progressing Towards Goal     Problem: Loneliness or Risk for Loneliness  Goal: Demonstrate positive use of time alone when socialization is not possible  Outcome: Progressing Towards Goal     Problem: Fatigue  Goal: Verbalize increase energy and improved vitality  Outcome: Progressing Towards Goal     Problem: Patient Education: Go to Patient Education Activity  Goal: Patient/Family Education  Outcome: Progressing Towards Goal

## 2021-12-14 NOTE — PROGRESS NOTES
Progress Note         Patient: Cara Ospina MRN: [id number]  CSN: [id number]    YOB: 1970  Age: 45 y.o. Sex: male    DOA: 12/1/2021 LOS:  LOS: 13 days                    Subjective: Patient feels much better today, shortness of breath significantly improved. Patient has been weaned from 12 L O2 to 3 L today. I made to ambulate patient in the room, he desatted to 85% on 3 L. Cara Ospina is a 45 y.o. male with a PMHx of nephrolithiasis who is admitted for acute hypoxic respiratory failure secondary to severe Covid19 pneumonia. Objective:     Physical Exam:  Visit Vitals  /88 (BP 1 Location: Right upper arm, BP Patient Position: At rest)   Pulse 91   Temp 98 °F (36.7 °C)   Resp 22   Ht 5' 9\" (1.753 m)   Wt 77.7 kg (171 lb 4.8 oz)   SpO2 98%   BMI 25.30 kg/m²        General:         Alert, cooperative, no acute distress    Lungs: CTA Bilaterally. No Wheezing/Rales. Heart:  Regular  rhythm  Abdomen: Soft, Non distended, Non tender. +Bowel sounds, no HSM  Extremities: No edema  Psych:   Not anxious or agitated. Neurologic:  Alert and oriented X 3. No acute neurological deficits. Intake and Output:  Current Shift:  No intake/output data recorded. Last three shifts:  12/12 1901 - 12/14 0700  In: 480 [P.O.:480]  Out: 2225 [Urine:2225]    Labs: Results:       Chemistry Recent Labs     12/14/21  0329 12/13/21  0149 12/12/21  0158   GLU 86 92 91    137 134*   K 4.5 4.1 4.2    103 102   CO2 27 30 29   BUN 32* 33* 31*   CREA 1.09 1.22 1.15   CA 8.4* 8.5 8.4*   AGAP 6 4 3   BUCR 29* 27* 27*      CBC w/Diff Recent Labs     12/14/21  0329   WBC 4.1*   RBC 4.98   HGB 13.9   HCT 44.5      GRANS 59   LYMPH 21   EOS 1      Cardiac Enzymes No results for input(s): CPK, CKND1, SABINA in the last 72 hours. No lab exists for component: CKRMB, TROIP   Coagulation No results for input(s): PTP, INR, APTT, INREXT, INREXT in the last 72 hours.     Lipid Panel No results found for: CHOL, CHOLPOCT, CHOLX, CHLST, CHOLV, 190660, HDL, HDLP, LDL, LDLC, DLDLP, 532411, VLDLC, VLDL, TGLX, TRIGL, TRIGP, TGLPOCT, CHHD, CHHDX   BNP No results for input(s): BNPP in the last 72 hours. Liver Enzymes No results for input(s): TP, ALB, TBIL, AP in the last 72 hours. No lab exists for component: SGOT, GPT, DBIL   Thyroid Studies No results found for: T4, T3U, TSH, TSHEXT, TSHEXT             Assessment and Plan:     Nishant Strong is a 45 y.o. male with a PMHx of nephrolithiasis who is admitted for acute hypoxic respiratory failure secondary to severe Covid19 pneumonia. 1. Acute hypoxic respiratory failure  2. Severe Covid19 pneumonia  3. Constipationresolved    Plan  Off high flow O2 supplement, currently on 3 L. Desats and exertion. Will assess for home oxygen. Discussed with RN  status post IV steroids  Follow-up inflammatory markers and BMP  Incentive spirometry  Droplet plus precautions    Discussed with RN at the bedside    Discussed with the patient at the bedside and explained in detail about my above plan of care. Offered to talk to family, patient states he will discuss with his family and update them. Case discussed with:  [x]Patient  []Family  [x]Nursing  []Case Management  DVT prophylaxis: Lovenox  Diet: Regular  Contact: Romana Brownie (wife)      [phone]  Code Status: Full  Disposition: Home tomorrow with home oxygen if needed. Jorje Hernández MD  12/14/2021 5:28 PM        Dragon medical dictation software was used for portions of this report. Unintended errors may occur.

## 2021-12-14 NOTE — ROUTINE PROCESS
0705-Bedside and Verbal shift change report given to [address] (oncoming nurse) by Harriet Cano RN (offgoing nurse). Report included the following information SBAR, Kardex and MAR     1905-Bedside and Verbal shift change report given to [employer] (oncoming nurse) by Baldev Rosen RN (offgoing nurse). Report included the following information SBAR, Kardex and MAR.

## 2021-12-15 VITALS
BODY MASS INDEX: 25.37 KG/M2 | OXYGEN SATURATION: 94 % | SYSTOLIC BLOOD PRESSURE: 106 MMHG | DIASTOLIC BLOOD PRESSURE: 77 MMHG | TEMPERATURE: 97.8 F | RESPIRATION RATE: 23 BRPM | WEIGHT: 171.3 LBS | HEART RATE: 74 BPM | HEIGHT: 69 IN

## 2021-12-15 PROCEDURE — 74011250637 HC RX REV CODE- 250/637: Performed by: HOSPITALIST

## 2021-12-15 PROCEDURE — 74011250637 HC RX REV CODE- 250/637: Performed by: FAMILY MEDICINE

## 2021-12-15 PROCEDURE — 74011250636 HC RX REV CODE- 250/636: Performed by: INTERNAL MEDICINE

## 2021-12-15 PROCEDURE — 99239 HOSP IP/OBS DSCHRG MGMT >30: CPT | Performed by: HOSPITALIST

## 2021-12-15 PROCEDURE — 74011250637 HC RX REV CODE- 250/637: Performed by: INTERNAL MEDICINE

## 2021-12-15 RX ORDER — DOCUSATE SODIUM 100 MG/1
100 CAPSULE, LIQUID FILLED ORAL
Qty: 30 CAPSULE | Refills: 0 | Status: SHIPPED | OUTPATIENT
Start: 2021-12-15 | End: 2022-03-15

## 2021-12-15 RX ADMIN — Medication 10 ML: at 00:14

## 2021-12-15 RX ADMIN — POLYETHYLENE GLYCOL 3350 17 G: 17 POWDER, FOR SOLUTION ORAL at 10:43

## 2021-12-15 RX ADMIN — ENOXAPARIN SODIUM 40 MG: 100 INJECTION SUBCUTANEOUS at 10:43

## 2021-12-15 RX ADMIN — Medication 10 ML: at 07:21

## 2021-12-15 RX ADMIN — DOCUSATE SODIUM 100 MG: 100 CAPSULE ORAL at 10:43

## 2021-12-15 RX ADMIN — FAMOTIDINE 20 MG: 20 TABLET ORAL at 10:43

## 2021-12-15 NOTE — PROGRESS NOTES
Discharge planning      Faxed home oxygen order and clinicals via [employer] and manually faxed walk test to [facility] for processing. Discharge order noted for today. Patient and agreeable to the transition plan today. Transport has been arranged with wife. Nilsa Villa Updated bedside RN, Koffi Sexton,  to the transition plan. Discharge information has been documented on the AVS.     3:48 pm. Patient stated \" They are going to deliver the oxygen to my house. My wife is going to bring the tank when she pick me up. They are supposed to call her first and meet her at the house. \"    HERBIE KaplanN, RN  Pager # [phone]  Care Manager

## 2021-12-15 NOTE — PROGRESS NOTES
0725: Bedside shift change report given to Germán Yusuf RN (oncoming nurse) by Fidel Brown RN(offgoing nurse). Report included the following information SBAR, Kardex, Intake/Output, MAR and Cardiac Rhythm NSR. Bedside shift change report given to Srinivasan Ledbetter (oncoming nurse) by Germán Yusuf RN (offgoing nurse). Report included the following information SBAR, Kardex, Intake/Output, MAR and Cardiac Rhythm NSR.

## 2021-12-15 NOTE — DISCHARGE INSTRUCTIONS
Discharge Instructions    Patient: Jory Dale MRN: [id number]  CSN: [id number]    YOB: 1970  Age: 45 y.o. Sex: male    DOA: 12/1/2021 LOS:  LOS: 14 days   Discharge Date:      DIET:  Regular Diet    ACTIVITY: Activity as tolerated  · Home health care for Home O2    ADDITIONAL INFORMATION: If you experience any of the following symptoms but not limited to Fever, chills, nausea, vomiting, diarrhea, change in mentation, falling, bleeding, shortness of breath, chest pain, please call your primary care physician or return to the emergency room if you cannot get hold of your doctor:     FOLLOW UP CARE:  PCPin 5-7 days. Please call and set up an appointment. Ariella Pearce MD  12/15/2021 11:23 AM      Patient Education   Learning About Coronavirus ([id number]-31)  Coronavirus [phone]): Overview  What is coronavirus ([id number]-71)? The coronavirus disease (COVID-19) is caused by a virus. It is an illness that was first found in [place], [place], in December 2019. It has since spread worldwide. The virus can cause fever, cough, and trouble breathing. In severe cases, it can cause pneumonia and make it hard to breathe without help. It can cause death. Coronaviruses are a large group of viruses. They cause the common cold. They also cause more serious illnesses like Middle East respiratory syndrome (MERS) and severe acute respiratory syndrome (SARS). COVID-19 is caused by a novel coronavirus. That means it's a new type that has not been seen in people before. This virus spreads person-to-person through droplets from coughing and sneezing. It can also spread when you are close to someone who is infected. And it can spread when you touch something that has the virus on it, such as a doorknob or a tabletop. What can you do to protect yourself from coronavirus (COVID-19)? The best way to protect yourself from getting sick is to:  · Avoid areas where there is an outbreak.   · Avoid contact with people who may be infected. · Wash your hands often with soap or alcohol-based hand sanitizers. · Avoid crowds and try to stay at least 6 feet away from other people. · Wash your hands often, especially after you cough or sneeze. Use soap and water, and scrub for at least 20 seconds. If soap and water aren't available, use an alcohol-based hand . · Avoid touching your mouth, nose, and eyes. What can you do to avoid spreading the virus to others? To help avoid spreading the virus to others:  · Cover your mouth with a tissue when you cough or sneeze. Then throw the tissue in the trash. · Use a disinfectant to clean things that you touch often. · Stay home if you are sick or have been exposed to the virus. Don't go to school, work, or public areas. And don't use public transportation. · If you are sick:  ? Leave your home only if you need to get medical care. But call the doctor's office first so they know you're coming. And wear a face mask, if you have one.  ? If you have a face mask, wear it whenever you're around other people. It can help stop the spread of the virus when you cough or sneeze. ? Clean and disinfect your home every day. Use household  and disinfectant wipes or sprays. Take special care to clean things that you grab with your hands. These include doorknobs, remote controls, phones, and handles on your refrigerator and microwave. And don't forget countertops, tabletops, bathrooms, and computer keyboards. When to call for help  Call 911 anytime you think you may need emergency care. For example, call if:  · You have severe trouble breathing. (You can't talk at all.)  · You have constant chest pain or pressure. · You are severely dizzy or lightheaded. · You are confused or can't think clearly. · Your face and lips have a blue color. · You pass out (lose consciousness) or are very hard to wake up.   Call your doctor now if you develop symptoms such as:  · Shortness of breath. · Fever. · Cough. If you need to get care, call ahead to the doctor's office for instructions before you go. Make sure you wear a face mask, if you have one, to prevent exposing other people to the virus. Where can you get the latest information? The following health organizations are tracking and studying this virus. Their websites contain the most up-to-date information. Elliot Conroy also learn what to do if you think you may have been exposed to the virus. · U.S. Centers for Disease Control and Prevention (CDC): The CDC provides updated news about the disease and travel advice. The website also tells you how to prevent the spread of infection. www.cdc.gov  · World Health Organization [facility]): WHO offers information about the virus outbreaks. WHO also has travel advice. www.who.int  Current as of: April 1, 2020               Content Version: 12.4  © 2006-2020 [employer]. Care instructions adapted under license by your healthcare professional. If you have questions about a medical condition or this instruction, always ask your healthcare professional. [place] [address] any warranty or liability for your use of this information. Patient Education        [address] After Treatment for COVID-19: Care Instructions  Overview     You are being sent home from the hospital after being treated for COVID-19. Being in the hospital can be hard, especially if you've been in the intensive care unit (ICU). Even though you're going home, you probably don't feel well yet. Healing from COVID-19 takes time. You may feel very tired for weeks or months afterward, especially if you were on a ventilator. It will take time to get back to your old level of activity. Some people may have long-lasting health problems. But most people can look forward to feeling a little better every day.   If you were on a ventilator, your throat may be sore and your voice hoarse or raspy for a while.  After leaving the hospital, some people have feelings of anxiety and depression. They may have nightmares. Or in their mind they may relive events that happened in the hospital (flashbacks). Reach out to your doctor if you're having trouble with these symptoms. Your doctor will tell you if you need to isolate yourself at home, and when you can end isolation. How can you self-isolate when you have COVID-19? If you have COVID-19, there are things you can do to help avoid spreading the virus to others. · Limit contact with people in your home. If possible, stay in a separate bedroom and use a separate bathroom. · Wear a mask when you are around other people. · If you have to leave home, avoid crowds and try to stay at least 6 feet away from other people. · Avoid contact with pets and other animals. · Cover your mouth and nose with a tissue when you cough or sneeze. Then throw it in the trash right away. · Wash your hands often, especially after you cough or sneeze. Use soap and water, and scrub for at least 20 seconds. If soap and water aren't available, use an alcohol-based hand . · Don't share personal household items. These include bedding, towels, cups and glasses, and eating utensils. · [address] in the warmest water allowed for the fabric type, and dry it completely. It's okay to wash other people's laundry with yours. · Clean and disinfect your home. Use household  and disinfectant wipes or sprays. Follow-up care is a key part of your treatment and safety. Be sure to make and go to all appointments, and call your doctor if you are having problems. It's also a good idea to know your test results and keep a list of the medicines you take. How can you care for yourself at home? · Get plenty of rest. It can help you feel better. · Be kind to yourself if it's taking longer than you expected to feel better. You've been through a stressful time.   · Get up and walk around every hour or two while you're resting. Slowly increase your activity as you start to feel better. · Eat healthy foods. · Drink plenty of fluids. If you have kidney, heart, or liver disease and have to limit fluids, talk with your doctor before you increase the amount of fluids you drink. · If needed, take acetaminophen (Tylenol) or ibuprofen (Advil, Motrin) to reduce a fever. It may also help with muscle aches. Read and follow all instructions on the label. When should you call for help? Call 911 anytime you think you may need emergency care. For example, call if you have life-threatening symptoms, such as:    · You have severe trouble breathing. (You can't talk at all.)     · You have constant chest pain or pressure.     · You are severely dizzy or lightheaded.     · You are confused or can't think clearly.     · You have pale, gray, or blue-colored skin or lips.     · You pass out (lose consciousness) or are very hard to wake up. Call your doctor now or seek immediate medical care if:    · You have moderate trouble breathing. (You can't speak a full sentence.)     · You are coughing up blood (more than about 1 teaspoon).     · You have signs of low blood pressure. These include feeling lightheaded; being too weak to stand; and having cold, pale, clammy skin. Watch closely for changes in your health, and be sure to contact your doctor if:    · Your symptoms get worse.     · You are not getting better as expected.     · You have new or worse symptoms of anxiety, depression, nightmares, or flashbacks. Call before you go to the doctor's office. Follow their instructions. And wear a mask. Current as of: July 1, 2021               Content Version: 13.0  © 5548-9807 [employer]. Care instructions adapted under license by [employer] (which disclaims liability or warranty for this information).  If you have questions about a medical condition or this instruction, always ask your healthcare professional. [address] any warranty or liability for your use of this information.

## 2021-12-15 NOTE — DISCHARGE SUMMARY
Discharge Summary    Patient: Bo Small MRN: [id number]  CSN: [id number]    YOB: 1970  Age: 45 y.o. Sex: male    DOA: 12/1/2021 LOS:  LOS: 14 days        Disposition: Home with Rojelio Ramos for home oxygen    Discharge Date: 12/15/2021    Admission Diagnosis: Pneumonia due to COVID-19 virus [U07.1, J12.82]  Hypoxia [R09.02]    Discharge Diagnosis:    1. Acute hypoxic respiratory failure  2. Severe Covid19 pneumonia  3. Constipation      Discharge Condition: Stable      PHYSICAL EXAM  Visit Vitals  BP 96/65 (BP 1 Location: Right upper arm, BP Patient Position: At rest)   Pulse 68   Temp 98.4 °F (36.9 °C)   Resp 14   Ht 5' 9\" (1.753 m)   Wt 77.7 kg (171 lb 4.8 oz)   SpO2 99%   BMI 25.30 kg/m²       General: Alert, cooperative, no acute distress    HEENT: PERRLA, EOMI. Anicteric sclerae. Lungs:  CTA Bilaterally. No Wheezing/Rales. Heart:             Regular rate and Rhythm. Abdomen: Soft, Non distended, Non tender. + Bowel sounds. Extremities: No edema. Psych:   Good insight. Not anxious or agitated. Neurologic:  AA, oriented X 3. Moves all ext                                 Hospital Course:   Bo Small is a 45 y.o. male with a PMHx of nephrolithiasis who is admitted for acute hypoxic respiratory failure secondary to severe Covid19 pneumonia. Patient had worsening hypoxia requiring high flow O2 supplement. He also had elevated CRP and ferritin. Patient was started on IV steroids but given his worsening symptoms patient was also given Tocilizumab on 12/01/2021. Patient completed 1010 days course of IV steroids in the hospital.  Patient inflammatory markers started slowly trending down. He remained on high flow for a long time. Patient slowly was weaned off high flow O2 salter and then eventually to nasal cannula oxygen. Infectious disease will sign off on the patient. Patient clinically continued to improve. Patient currently being weaned off O2 supplement.   On exertion patient desaturated to 88%, patient will need home oxygen. Patient is hemodynamically and medically stable for discharge home with home health care for home oxygen. Cussed with the patient about discharge plan, follow-up appointments, need for O2 supplement. Patient understood and agreed with the plan. I offered to talk to family, patient says he will update his family. Procedures: None     Consults:   Dr. Cornelius Garcia, infectious disease    Imaging studies:   CT Results (most recent):  Results from Hospital Encounter encounter on 12/01/21    CTA CHEST W OR W WO CONT    Narrative  CT CHEST PULMONARY EMBOLISM PROTOCOL    CPT code: 56316    INDICATION: Chest pain. Shortness of breath. Reported positive Covid infection. Question pulmonary embolism. TECHNIQUE: 4.8CE collimation helical images obtained through the level of the  pulmonary arteries with additional imaging through the chest following the  uneventful administration of 100 cc's nonionic intravenous contrast.  - Images reconstructed into three dimensional coronal and sagittal projections  for complete evaluation of the tortuous and overlapping pulmonary vascular  structures and to reduce patient radiation dose. All CT scans at this facility are performed using dose optimization technique as  appropriate to this specific exam, to include automated exposure control,  adjustment of the mA and/or KP according to patient size or use of iterative  reconstruction techniques. COMPARISON: Chest x-ray earlier same day    FINDINGS:  Heart size normal. No pericardial effusion. Nonenlarged aorta. Main pulmonary artery nonenlarged. No enlarged axillary lymph nodes. No  appreciably enlarged mediastinal nodes. No filling defects are appreciated within the main, left, right, lobar or  visualized segmental pulmonary arteries to suggest embolism. No pneumothorax. No overt pulmonary edema. No pleural effusions.   There are multifocal areas of groundglass in nature geographic infiltrates  predominantly in the periphery of the upper lobes. Small volume also in the  right middle lobe. More dense peripheral to subpleural parenchymal infiltrates  present in the bilateral lower lobes right greater than left. Distribution of  findings would be consistent with Covid pneumonia in the proper clinical  setting. Partially included liver low attenuation could reflect component of steatosis. Impression  1. No CT evidence for central pulmonary embolism. 2. Mixed aggregate of predominantly peripheral upper and right middle lobe  groundglass infiltrates and more dense and consolidative bilateral lower lobe  subpleural airspace infiltrates. Findings would be compatible with Covid  pneumonia distribution.  -No effusions. Discharge Medications:     Current Discharge Medication List      START taking these medications    Details   docusate sodium (COLACE) 100 mg capsule Take 1 Capsule by mouth two (2) times daily as needed for Constipation for up to 90 days. Qty: 30 Capsule, Refills: 0           · It is important that you take the medication exactly as they are prescribed. · Keep your medication in the bottles provided by the pharmacist and keep a list of the medication names, dosages, and times to be taken in your wallet. · Do not take other medications without consulting your doctor. DIET:  Regular Diet    ACTIVITY: Activity as tolerated  · Home health care for Home O2    ADDITIONAL INFORMATION: If you experience any of the following symptoms but not limited to Fever, chills, nausea, vomiting, diarrhea, change in mentation, falling, bleeding, shortness of breath, chest pain, please call your primary care physician or return to the emergency room if you cannot get hold of your doctor:     FOLLOW UP CARE:  PCPin 5-7 days. Please call and set up an appointment.     Minutes spent on discharge: 40 minutes spent coordinating this discharge (review instructions/follow-up, prescriptions, preparing report for sign off)    Heidi Ontiveros MD  12/15/2021 11:23 AM    Disclaimer: Sections of this note are dictated using utilizing voice recognition software. Minor typographical errors may be present. If questions arise, please do not hesitate to contact me or call our department.

## 2021-12-15 NOTE — ADT AUTH CERT NOTES
Utilization Reviews         Pneumonia - Care Day 12 (12/12/2021) by Jaylin Montiel RN       Review Status Review Entered   Completed 12/13/2021 13:27      Criteria Review      Care Day: 12 Care Date: 12/12/2021 Level of Care: Telemetry    Guideline Day 3    Level Of Care    ( ) Floor to discharge    12/13/2021 13:27:02 EST by Felecia Galindo      LOC IP TELEMETRY    Clinical Status    (X) * Hemodynamic stability    12/13/2021 13:27:02 EST by Felecia Galindo      VS 97.6 96 102/69 22 95% 20 LPM VIA HI FLOW NC    (X) * Afebrile or temperature acceptable for next level of care    12/13/2021 13:27:02 EST by Felecia Galindo      temp 97.6    ( ) * Tachypnea absent    12/13/2021 13:27:02 EST by Felecia Galindo      resp 22    ( ) * Hypoxemia absent    12/13/2021 13:27:02 EST by Felecia Galindo      95% 20 LPM VIA HI FLOW NC    (X) * Mental status at baseline    12/13/2021 13:27:02 EST by Felecia Galindo      alert,oriented    ( ) * Antibiotic regimen acceptable for next level of care    ( ) * Discharge plans and education understood    Activity    (X) * Ambulatory or acceptable for next level of care    12/13/2021 13:27:02 EST by Felecia Galindo      activity as tolerated w/ assistance    Routes    (X) * Oral hydration    12/13/2021 13:27:02 EST by Felecia Galindo      adult regular diet    (X) * Oral medications or regimen acceptable for next level of care    12/13/2021 13:27:03 EST by Felecia Galindo      MEDS  colace 100mg po qday  lovenox 40mg sc qday  Pepcid 20mg po bid    (X) * Oral diet or acceptable for next level of care    12/13/2021 13:27:03 EST by Felecia Galindo      adult regular diet    Interventions    ( ) * Oxygen absent or at baseline need    12/13/2021 13:27:03 EST by Felecia Galindo      20 lpm via hiflow nc    (X) * Isolation not indicated, or is performable at next level of care    12/13/2021 13:27:03 EST by Vish Norwood      droplet plus    (X) Incentive spirometry    12/13/2021 13:27:03 EST by Felecia Galindo      q2 hours    * Milestone   Additional Notes   12/12/2021   LOC IP TELEMETRY   VS 97.6 96 102/69 22 95% 20 LPM VIA HI FLOW NC   LABS     Sodium: 134 (L)   Potassium: 4.2   Chloride: 102   CO2: 29   Anion gap: 3   Glucose: 91   BUN: 31 (H)   Creatinine: 1.15   BUN/Creatinine ratio: 27 (H)   Calcium: 8.4 (L)         ATTENDING NOTE   Matthew Rick is a 45 y.o. male with a PMHx of nephrolithiasis who is admitted for acute hypoxic respiratory failure secondary to severe Covid-19 pneumonia.       No significant change in patient condition today       Seen earlier in room   Sitting up in bed, NAD   Shortness of breath improved   Has significant KYLE with any exertion   Cough improved, appetite is good       His wife is also in the hospital with Covid       Hypoxia improved   SPO2 in the mid 90s on 20 L/min, 35% O2   General:         Alert, cooperative, no acute distress     HEENT:           NC, Atraumatic. Anicteric sclerae. Lungs:            CTA Bilaterally. No Wheezing/Rhonchi/Rales. Heart:              Regular  rhythm,  No murmur, No Rubs, No Gallops   Abdomen:      Soft, Non distended, Non tender.  +Bowel sounds, no HSM   Extremities:   No c/c/e   Psych:              Not anxious or agitated. Neurologic:     Alert and oriented X 3.  No acute neurological deficits.    Matthew Rick is a 45 y.o. male with a PMHx of nephrolithiasis who is admitted for acute hypoxic respiratory failure secondary to severe Covid-19 pneumonia.       1. Acute hypoxic respiratory failure   2. Severe Covid-19 pneumonia   3.  Constipation-resolved       ID following   BUE and BLE PVLs showed no DVT   Continue O2 as needed and wean as tolerated   Has completed course of IV steroids   Follow-up inflammatory markers and BMP   Incentive spirometry   Droplet plus precautions               Pneumonia - Care Day 11 (12/11/2021) by Wandy Galindo RN       Review Status Review Entered   Completed 12/13/2021 13:23      Criteria Review      Care Day: 11 Care Date: 12/11/2021 Level of Care: Telemetry    Guideline Day 3    Level Of Care    ( ) Floor to discharge    12/13/2021 13:23:26 EST by Felecia Galindo      LOC IP TELEMETRY    Clinical Status    (X) * Hemodynamic stability    12/13/2021 13:23:26 EST by Felecia Galindo      VS 98.2 89 102/68 22 94% 20LPM HI FLOW    (X) * Afebrile or temperature acceptable for next level of care    12/13/2021 13:23:26 EST by Felecia Galindo      temp 98.2    ( ) * Tachypnea absent    ( ) * Hypoxemia absent    12/13/2021 13:23:26 EST by Felecia Galindo      94% 20LPM HI FLOW    (X) * Mental status at baseline    12/13/2021 13:23:26 EST by Felecia Galindo      alert,oriented    ( ) * Antibiotic regimen acceptable for next level of care    ( ) * Discharge plans and education understood    Activity    (X) * Ambulatory or acceptable for next level of care    12/13/2021 13:23:26 EST by Felecia Galindo      as tolerated w/ assistance    Routes    (X) * Oral hydration    12/13/2021 13:23:26 EST by Felecia Galindo      adult regular diet    (X) * Oral medications or regimen acceptable for next level of care    12/13/2021 13:23:26 EST by Felecia Galindo      MEDS  colace 100mg po qday  lovenox 40mg sc qday  Pepcid 20mg po bid    (X) * Oral diet or acceptable for next level of care    12/13/2021 13:23:26 EST by Felecia Galindo      adult regular diet    Interventions    ( ) * Oxygen absent or at baseline need    12/13/2021 13:23:26 EST by Felecia Galindo      94% 20LPM HI FLOW    (X) * Isolation not indicated, or is performable at next level of care    12/13/2021 13:23:26 EST by Shon-Finnegan, Felecia      droplet plus precautions    (X) Incentive spirometry    * Milestone   Additional Notes   12/11/2021   LOC IP TELEMETRY   LABS     D DIMER: 0.54 (H)   Sodium: 136   Potassium: 4.5   Chloride: 103   CO2: 24   Anion gap: 9   Glucose: 113 (H)   BUN: 29 (H)   Creatinine: 1.09   BUN/Creatinine ratio: 27 (H)   Calcium: 8.8   GFR est non-AA: >60   GFR est AA: >60   Procalcitonin: <0.05   C-Reactive protein: <0.3         ATTENDING NOTE   Thanh Jha is a 45 y.o. male with a PMHx of nephrolithiasis who is admitted for acute hypoxic respiratory failure secondary to severe Covid-19 pneumonia.       No significant change in patient condition today       Seen earlier in room   Sitting up in bed, NAD   Reports continued improvement in shortness of breath   Has significant KYLE with any exertion   Cough improved, appetite is good       His wife is also in the hospital with Covid       Hypoxia improved   SPO2 in the mid 90s on 20 L/min, 35% O2   General:         Alert, cooperative, no acute distress     HEENT:           NC, Atraumatic. Anicteric sclerae. Lungs:            CTA Bilaterally. No Wheezing/Rhonchi/Rales. Heart:              Regular  rhythm,  No murmur, No Rubs, No Gallops   Abdomen:      Soft, Non distended, Non tender.  +Bowel sounds, no HSM   Extremities:   No c/c/e   Psych:              Not anxious or agitated. Neurologic:     Alert and oriented X 3.  No acute neurological deficits. Thanh Jha is a 45 y.o. male with a PMHx of nephrolithiasis who is admitted for acute hypoxic respiratory failure secondary to severe Covid-19 pneumonia.       1. Acute hypoxic respiratory failure   2. Severe Covid-19 pneumonia   3.  Constipation-resolved       ID following   BUE and BLE PVLs showed no DVT   Continue O2 as needed and wean as tolerated   Has completed course of IV steroids   Follow-up inflammatory markers and BMP   Incentive spirometry   Droplet plus precautions               Pneumonia - Care Day 10 (12/10/2021) by Rosina Paget, RN       Review Status Review Entered   Completed 12/10/2021 19:21      Criteria Review      Care Day: 10 Care Date: 12/10/2021 Level of Care: Telemetry    Guideline Day 3    Clinical Status    (X) * Hemodynamic stability    (X) * Afebrile or temperature acceptable for next level of care    ( ) * Tachypnea absent    ( ) * Hypoxemia absent    (X) * Mental status at baseline    ( ) * Antibiotic regimen acceptable for next level of care    ( ) * Discharge plans and education understood    Activity    (X) * Ambulatory or acceptable for next level of care    Routes    (X) * Oral hydration    ( ) * Oral medications or regimen acceptable for next level of care    (X) * Oral diet or acceptable for next level of care    Interventions    ( ) * Oxygen absent or at baseline need    ( ) * Isolation not indicated, or is performable at next level of care    (X) WBC    (X) Incentive spirometry    * Milestone   Additional Notes   [facility] MEDICINE 12/10: Subjective:       Bo Small is a 45 y.o. male with a PMHx of nephrolithiasis who is admitted for acute hypoxic respiratory failure secondary to severe Covid-19 pneumonia.       Seen earlier in room   Sitting up in bed, NAD   Reports continued improvement in shortness of breath   Has significant KYLE with any exertion   Cough improved   Appetite is good       His wife is also in the hospital with Covid       Hypoxia improved   SPO2 in the mid 90s on 20 L/min, 30% O2        General:         Alert, cooperative, no acute distress     HEENT:           NC, Atraumatic. Anicteric sclerae. Lungs:            CTA Bilaterally. No Wheezing/Rhonchi/Rales. Heart:              Regular  rhythm,  No murmur, No Rubs, No Gallops   Abdomen:      Soft, Non distended, Non tender.  +Bowel sounds, no HSM   Extremities:   No c/c/e   Psych:              Not anxious or agitated.    Neurologic:     Alert and oriented X 3.  No acute neurological deficits.         Assessment and Plan:       Bo Small is a 45 y.o. male with a PMHx of nephrolithiasis who is admitted for acute hypoxic respiratory failure secondary to severe Covid-19 pneumonia.       Acute hypoxic respiratory failure   Severe Covid-19 pneumonia   Constipation-resolved       ID following   BUE and BLE PVLs showed no DVT   Continue O2 as needed and wean as tolerated   Has completed course of IV steroids after today's dose   Follow-up S&S of Pseudomonas in sputum culture   Follow-up inflammatory markers and BMP   Incentive spirometry   Droplet plus precautions                DDIMER 0.67   ALBUMIN 2.5                109/77    97.7F    91HR    21RR    90%25L HIGH FLOW NC            DECADRON Q24H 10MG IV X 1               Pneumonia - Care Day 9 (12/9/2021) by Bhanu Metzger       Review Status Review Entered   Completed 12/10/2021 10:44      Criteria Review      Care Day: 9 Care Date: 12/9/2021 Level of Care: Telemetry    Guideline Day 3    Clinical Status    (X) * Hemodynamic stability    12/10/2021 10:44:12 EST by Bhanu Metzger      98.0, 113/75, 95, 20, 94% 30l hfnc    (X) * Afebrile or temperature acceptable for next level of care    ( ) * Tachypnea absent    ( ) * Hypoxemia absent    (X) * Mental status at baseline    12/10/2021 10:44:12 EST by Bhanu Metzger      Alert and oriented X 3    ( ) * Antibiotic regimen acceptable for next level of care    ( ) * Discharge plans and education understood    Activity    (X) * Ambulatory or acceptable for next level of care    Routes    (X) * Oral hydration    ( ) * Oral medications or regimen acceptable for next level of care    (X) * Oral diet or acceptable for next level of care    12/10/2021 10:44:12 EST by Bhanu Metzger      regular diet    Interventions    ( ) * Oxygen absent or at baseline need    ( ) * Isolation not indicated, or is performable at next level of care    (X) WBC    12/10/2021 10:44:12 EST by Bhanu Metzger      7.8    (X) Incentive spirometry    * Milestone   Additional Notes   12/9:       results:    d dimer 0.85    K 5.8    glucose 126    BUN 20    alt 185        meds: decadron 10 mg IV x1 (q24hrs)    lovenox 40 mg sc x1 (daily)        ID pn:    Currently patient is improving clinically off antibiotics.  Low procalcitonin.  Improved cough.  Pseudomonas in sputum culture could be colonizer.  Hence we will continue monitoring off antibiotics   Recommendations:   1.  Discontinue Decadron after tomorrow's dose   2.  F/u ID and susceptibility of Pseudomonas in sputum cx-Will initiate antibiotics if clinical evidence of bacterial infection noted on future exam       IM PN:    subjective:    Cough improved       a/p:    BUE and BLE PVLs showed no DVT   S&S of Pseudomonas in sputum culture           Pneumonia - Care Day 8 (12/8/2021) by Dinorah Alcantara       Review Status Review Entered   Completed 12/10/2021 10:36      Criteria Review      Care Day: 8 Care Date: 12/8/2021 Level of Care: Telemetry    Guideline Day 3    Clinical Status    (X) * Hemodynamic stability    12/10/2021 10:36:42 EST by Dinorah Alcantara      97.7, 117/86, 72, 16, 96% 30lnc    (X) * Afebrile or temperature acceptable for next level of care    ( ) * Tachypnea absent    ( ) * Hypoxemia absent    (X) * Mental status at baseline    12/10/2021 10:36:42 EST by Dinorah Alcantara      Alert and oriented X 3    ( ) * Antibiotic regimen acceptable for next level of care    ( ) * Discharge plans and education understood    Activity    (X) * Ambulatory or acceptable for next level of care    Routes    (X) * Oral hydration    ( ) * Oral medications or regimen acceptable for next level of care    (X) * Oral diet or acceptable for next level of care    12/10/2021 10:36:42 EST by Dinorah Alcantara      regular diet    Interventions    ( ) * Oxygen absent or at baseline need    ( ) * Isolation not indicated, or is performable at next level of care    (X) WBC    12/10/2021 10:36:42 EST by Dinorah Alcantara      8.5    (X) Incentive spirometry    * Milestone   Additional Notes   12/8:       results:    d dimer 0.95    glucose 149    bun 22    ca 8.4    alt 231    ast 42        meds:    decadron 19 ng UV x1 (q24hrs)    lovenox 40 mg sc x1 (daily)        ID pn:    Gradually improving hypoxia. Cough with yellowish sputum. R/o superimposed bacterial pneumonia.  Sputum culture 12/7-normal respiratory kinsey   Resolved constipation   F/u sputum cx   Avoid constipation       IM PN:    subjective:    Reports mild improvement in shortness of breath   Also productive cough   Hypoxia improved

## 2021-12-15 NOTE — PROGRESS NOTES
Problem: Risk for Spread of Infection  Goal: Prevent transmission of infectious organism to others  Description: Prevent the transmission of infectious organisms to other patients, staff members, and visitors. Outcome: Progressing Towards Goal     Problem: Patient Education:  Go to Education Activity  Goal: Patient/Family Education  Outcome: Progressing Towards Goal     Problem: Falls - Risk of  Goal: *Absence of Falls  Description: Document Clifton Marybeth Fall Risk and appropriate interventions in the flowsheet. Outcome: Progressing Towards Goal  Note: Fall Risk Interventions:  Mobility Interventions: Bed/chair exit alarm         Medication Interventions: Bed/chair exit alarm, Patient to call before getting OOB, Teach patient to arise slowly    Elimination Interventions: Bed/chair exit alarm, Call light in reach, Patient to call for help with toileting needs, Toileting schedule/hourly rounds, Toilet paper/wipes in reach, Urinal in reach    History of Falls Interventions: Bed/chair exit alarm         Problem: Patient Education: Go to Patient Education Activity  Goal: Patient/Family Education  Outcome: Progressing Towards Goal     Problem: Pain  Goal: *Control of Pain  Outcome: Progressing Towards Goal  Goal: *PALLIATIVE CARE:  Alleviation of Pain  Outcome: Progressing Towards Goal     Problem: Patient Education: Go to Patient Education Activity  Goal: Patient/Family Education  Outcome: Progressing Towards Goal     Problem: Pressure Injury - Risk of  Goal: *Prevention of pressure injury  Description: Document Dann Scale and appropriate interventions in the flowsheet. Outcome: Progressing Towards Goal  Note: Pressure Injury Interventions: Activity Interventions: Assess need for specialty bed, Pressure redistribution bed/mattress(bed type)    Mobility Interventions: Pressure redistribution bed/mattress (bed type), Turn and reposition approx.  every two hours(pillow and wedges)    Nutrition Interventions: Document food/fluid/supplement intake                     Problem: Patient Education: Go to Patient Education Activity  Goal: Patient/Family Education  Outcome: Progressing Towards Goal     Problem: Airway Clearance - Ineffective  Goal: Achieve or maintain patent airway  Outcome: Progressing Towards Goal     Problem: Gas Exchange - Impaired  Goal: Absence of hypoxia  Outcome: Progressing Towards Goal  Goal: Promote optimal lung function  Outcome: Progressing Towards Goal     Problem: Breathing Pattern - Ineffective  Goal: Ability to achieve and maintain a regular respiratory rate  Outcome: Progressing Towards Goal     Problem:  Body Temperature -  Risk of, Imbalanced  Goal: Ability to maintain a body temperature within defined limits  Outcome: Progressing Towards Goal  Goal: Will regain or maintain usual level of consciousness  Outcome: Progressing Towards Goal  Goal: Complications related to the disease process, condition or treatment will be avoided or minimized  Outcome: Progressing Towards Goal     Problem: Isolation Precautions - Risk of Spread of Infection  Goal: Prevent transmission of infectious organism to others  Outcome: Progressing Towards Goal     Problem: Nutrition Deficits  Goal: Optimize nutrtional status  Outcome: Progressing Towards Goal     Problem: Risk for Fluid Volume Deficit  Goal: Maintain normal heart rhythm  Outcome: Progressing Towards Goal  Goal: Maintain absence of muscle cramping  Outcome: Progressing Towards Goal  Goal: Maintain normal serum potassium, sodium, calcium, phosphorus, and pH  Outcome: Progressing Towards Goal     Problem: Loneliness or Risk for Loneliness  Goal: Demonstrate positive use of time alone when socialization is not possible  Outcome: Progressing Towards Goal     Problem: Fatigue  Goal: Verbalize increase energy and improved vitality  Outcome: Progressing Towards Goal     Problem: Patient Education: Go to Patient Education Activity  Goal: Patient/Family Education  Outcome: Progressing Towards Goal

## 2021-12-16 ENCOUNTER — PATIENT OUTREACH (OUTPATIENT)
Dept: CASE MANAGEMENT | Age: 51
End: 2021-12-16

## 2021-12-16 NOTE — PROGRESS NOTES
Care Transitions Initial Call    Call within 2 business days of discharge: Yes     Patient: Helio Cedeño Patient : 1970 MRN: [id number]    Last Discharge [address]       Complaint Diagnosis Description Type Department Provider    21 Shortness of Breath COVID-19 . .. ED to Hosp-Admission (Discharged) (ADMIT) William Jackson MD;... Was this an external facility discharge? No Discharge Facility: n/a    CTN Attempt to contact patient via telephone on 21 for post hospital follow up. Unable to reach. Left message on voicemail with office contact information. No Patient medical information left on message.     Future Appointments   Date Time Provider Chuy Palma   2021  1:00 PM MD JOVANNA Ashton AMB

## 2021-12-17 ENCOUNTER — PATIENT OUTREACH (OUTPATIENT)
Dept: CASE MANAGEMENT | Age: 51
End: 2021-12-17

## 2021-12-17 NOTE — PROGRESS NOTES
Care Transitions Initial Call    Call within 2 business days of discharge: Yes     Patient: Emilia Padilla Patient : 1970 MRN: [id number]    Last Discharge [address]       Complaint Diagnosis Description Type Department Provider    21 Shortness of Breath COVID-19 . .. ED to Hosp-Admission (Discharged) (ADMIT) Kavon Cornelius MD;... Challenges to be reviewed by the provider   Additional needs identified to be addressed with provider: no  none         Method of communication with provider : none    Discussed COVID-19 related testing which was not done at this time. Test results were not done at this time. Advance Care Planning:   Does patient have an Advance Directive:  not on file at this time. Inpatient Readmission Risk score: Unplanned Readmit Risk Score: 9.4 ( )    Was this a readmission? no   Patient stated reason for the admission: dyspnea     Patients top risk factors for readmission: establish care with a provider. Interventions to address risk factors: establish care with a provider. Care Transition Nurse (CTN) contacted the patient by telephone to perform post hospital discharge assessment. Verified name and  with patient as identifiers. Provided introduction to self, and explanation of the CTN role. Patient reports that he is doing well and feeling better. No new or worsening of symptoms reported by patient at this time. Patient states that he received his oxygen. Patient reports that he has an upcoming new PCP appointment on 21. Patient reports that he is independent and has good support from spouse. Ctn offers home health, Patient states that he doesn't think he needs home health at this time. Patient states that he still continuing to quarantine as advised. No questions, concerns and/or needs at this time as per Patient.      Reminded Pt. to go to the nearest emergency room for chest pain, shortness of breath, returning of symptoms that brought him to the emergency room and/or worsening of symptoms. Pt. Verbalized and repeated back understanding. CTN reviewed discharge instructions, medical action plan and red flags with patient who verbalized understanding. Were discharge instructions available to patient? yes. Reviewed appropriate site of care based on symptoms and resources available to patient including: PCP and When to call 911. Patient given an opportunity to ask questions and does not have any further questions or concerns at this time. The patient agrees to contact the PCP office for questions related to their healthcare. Medication reconciliation was performed with patient, who verbalizes understanding of administration of home medications. Advised obtaining a 90-day supply of all daily and as-needed medications. Referral to Pharm D needed: No .    Home Health/Outpatient orders at discharge: Pt. states that he doesnt think he needs home health. Durable Medical Equipment ordered at discharge: Oxygen - received as per Patient  [address]: [address] [facility] Education    Educated patient about risk for severe COVID-19 due to risk factors according to CDC guidelines. CTN reviewed discharge instructions, medical action plan and red flag symptoms with the patient who verbalized understanding. Discussed COVID vaccination status: yes. Education provided on COVID-19 vaccination as appropriate. Discussed exposure protocols and quarantine with CDC Guidelines. Patient was given an opportunity to verbalize any questions and concerns and agrees to contact CTN or health care provider for questions related to their healthcare. Discussed follow-up appointments. [facility] follow up appointment(s):   Future Appointments   Date Time Provider Chuy Palma   12/22/2021  1:00 PM MD MAJOR Crocker[facility]     [facility] follow up appointment(s): none noted at this time. Plan for follow-up call in 7-10 days based on severity of symptoms and risk factors. Plan for next call: attendance to new PCP apt. assess symptoms, assess for any needs  CTN provided contact information for future needs. Goals Addressed                 This Visit's Progress     Prevent complications post hospitalization. Patient will attend apt with Dr. Georgia Rodrigues on 12/22/21. Patient will call CTN  office for any questions, concerns and/or needs.  Understands red flags post discharge. Pt. Will go to the nearest emergency room for chest pain, shortness of breath, returning of symptoms that brought him to the emergency room and/or worsening of symptoms.

## 2021-12-22 ENCOUNTER — OFFICE VISIT (OUTPATIENT)
Dept: FAMILY MEDICINE CLINIC | Age: 51
End: 2021-12-22
Payer: COMMERCIAL

## 2021-12-22 DIAGNOSIS — Z87.442 HISTORY OF KIDNEY STONES: ICD-10-CM

## 2021-12-22 DIAGNOSIS — U07.1 COVID-19: Primary | ICD-10-CM

## 2021-12-22 PROCEDURE — 99203 OFFICE O/P NEW LOW 30 MIN: CPT | Performed by: EMERGENCY MEDICINE

## 2021-12-22 NOTE — LETTER
NOTIFICATION RETURN TO WORK / SCHOOL    12/22/2021 1:11 PM    Mr. Manasa Lutz [phone]      To Whom It May Concern:    Mohinder Aguilar is currently under the care of [facility] KATERYNA López. He will return to work/school on: 01/03/2021    If there are questions or concerns please have the patient contact our office.         Sincerely,      Scar Neal MD

## 2021-12-22 NOTE — PROGRESS NOTES
12/22/21          ICD-10-CM ICD-9-CM    1. COVID-19  U07.1 079.89    2. History of kidney stones  Z87.442 V13.01          Assessment and plan  S/p hospitalization for COVID-19. On home oxygen. After discussion with the patient he thinks he may be strong enough to go back to work on the first part of January. Vibrotactile affect. We'll follow-up with him in 2 weeks. History of kidney stones, no symptoms at this time. Patient has no other underlying illnesses. Lab results and schedule of future lab studies reviewed with patient  Diagnostic and radiologic results and the schedule of future studies were reviewed with the patient  All questions were answered and understood. Subjective:   Spencer Thomas is a 45 y.o. male has Dehydration, Acute kidney injury (nontraumatic) ([address] Right ureteral stone, Right nephrolithiasis, Hypoxia, and Pneumonia due to COVID-19 virus on their problem list.. No chief complaint on file. Patient presents to [facility] care. Admission 12/1 through 12/15/2021 for COVID-19 pneumonia. He received tocilizumab lab as well as IV steroids. The CT scan of the chest did not show PE but did show infiltrates. He was previously admitted for renal stones. This was on 2/21/2018. In February 2018 he was admitted for dehydration. S/p  COVID 19  KYLE, improved post discharge. Improved discharged home on no medications except for home oxygen. He has minor coughing with small amounts of phlegm production. This is primarily postprandial.  The patient was previously on vaccinated with plans to do so in January 2021. Works at dispatch at a [employer]          Review of Systems   Constitutional: Negative for chills and fever. Eyes: Negative for blurred vision. Respiratory: Positive for cough, sputum production and shortness of breath. Negative for hemoptysis and wheezing. Cardiovascular: Negative for chest pain. Gastrointestinal: Negative for heartburn. Genitourinary: Negative for dysuria and urgency. Musculoskeletal: Negative for myalgias. Neurological: Negative for sensory change. Health Maintenance Due   Topic Date Due    COVID-19 Vaccine (1) Never done    DTaP/Tdap/Td series (1 - Tdap) Never done    Lipid Screen  Never done    Colorectal Cancer Screening Combo  Never done    Shingrix Vaccine Age 50> (1 of 2) Never done    Flu Vaccine (1) Never done     Current Outpatient Medications   Medication Sig    docusate sodium (COLACE) 100 mg capsule Take 1 Capsule by mouth two (2) times daily as needed for Constipation for up to 90 days. No current facility-administered medications for this visit. Allergies   Allergen Reactions    Codeine Other (comments)     Pt. States \"I throw up\". has Dehydration, Acute kidney injury (nontraumatic) ([address] Right ureteral stone, Right nephrolithiasis, Hypoxia, and Pneumonia due to COVID-19 virus on their problem list.    Past Surgical History:   Procedure Laterality Date    HX OTHER SURGICAL      Stent placed in Right Kidney on 2/12/2018      reports that he has never smoked. He has never used smokeless tobacco. He reports previous drug use. Drugs: Marijuana and Cocaine. He reports that he does not drink alcohol.  family history is not on file. There were no vitals taken for this visit. Physical Exam  Constitutional:       General: He is not in acute distress. HENT:      Right Ear: External ear normal.      Left Ear: External ear normal.      Nose: Nose normal.      Mouth/Throat:      Mouth: Mucous membranes are moist.      Pharynx: Oropharynx is clear. Eyes:      General: No scleral icterus. Extraocular Movements: Extraocular movements intact. Pupils: Pupils are equal, round, and reactive to light. Pulmonary:      Effort: Pulmonary effort is normal.   Musculoskeletal:         General: No swelling. Right lower leg: No edema. Left lower leg: No edema.    Skin:     Coloration: Skin is not jaundiced. Findings: No erythema. Neurological:      Mental Status: He is alert and oriented to person, place, and time. Coordination: Coordination normal.      Gait: Gait normal.   Psychiatric:         Mood and Affect: Mood normal.         Behavior: Behavior normal.          We discussed the expected course, resolution and complications of the diagnosis(es) in detail. Medication risks, benefits, costs, interactions, and alternatives were discussed as indicated. I advised him to contact the office if his condition worsens, changes or fails to improve as anticipated. He expressed understanding with the diagnosis(es) and plan. This note was done with the assistance of dragon speech software.   Some inadvertent errors or omissions may be present

## 2021-12-23 ENCOUNTER — PATIENT OUTREACH (OUTPATIENT)
Dept: CASE MANAGEMENT | Age: 51
End: 2021-12-23

## 2021-12-23 NOTE — PROGRESS NOTES
Care Transitions Follow Up Call    Challenges to be reviewed by the provider   Additional needs identified to be addressed with provider: no  none           Method of communication with provider : none    Care Transition Nurse (CTN) contacted the patient by telephone to follow up. Verified name and  with patient as identifiers. Patient reports that he is doing well. No new or worsening of symptoms reported by Pt. At this time. Pt. Attended PCP apt yesterday 21. No questions, concerns and/or needs at this time as per Pt. Addressed changes since last contact: none  Follow up appointment completed? yes. Was follow up appointment scheduled within 7 days of discharge? no.     Pt. Thanked us for follow up call.      [address] follow up appointment(s):   Future Appointments   Date Time Provider Chuy Palma   2022  2:00 PM MD MAJOR Meade-CARLOS [facility]

## 2022-01-05 ENCOUNTER — VIRTUAL VISIT (OUTPATIENT)
Dept: FAMILY MEDICINE CLINIC | Age: 52
End: 2022-01-05
Payer: COMMERCIAL

## 2022-01-05 DIAGNOSIS — U07.1 COVID-19: Primary | ICD-10-CM

## 2022-01-05 DIAGNOSIS — Z87.442 HISTORY OF KIDNEY STONES: ICD-10-CM

## 2022-01-05 PROCEDURE — 99213 OFFICE O/P EST LOW 20 MIN: CPT | Performed by: EMERGENCY MEDICINE

## 2022-01-05 NOTE — PROGRESS NOTES
ICD-10-CM ICD-9-CM    1. COVID-19  U07.1 079.89    2. History of kidney stones  Z87.442 V13.01          Assessment and plan  S/p Covid infection, now back to work with minimal dyspnea on exertion. Will check back with us in 3 months about health maintenance unless he finds another PCP which he is deciding on. No recurrence of kidney stones. Lab results and schedule of future lab studies reviewed with patient  All questions answered and understood. Subjective:   Marah Kumar is a 45 y.o. male has Dehydration, Acute kidney injury (nontraumatic) ([facility] [address] Right ureteral stone, Right nephrolithiasis, Hypoxia, and Pneumonia due to COVID-19 virus on their problem list.. No chief complaint on file. S/p hospitalization for COVID-19. On home oxygen. After discussion with the patient he thinks he may be strong enough to go back to work on the first part of January. Vibrotactile affect. We'll follow-up with him in 2 weeks. History of kidney stones, no symptoms at this time. Patient has no other underlying illnesses. Lab results and schedule of future lab studies reviewed with patient  Diagnostic and radiologic results and the schedule of future studies were reviewed with the patient  All questions were answered and understood. Admission 12/1 through 12/15/2021 for COVID-19 pneumonia. He received tocilizumab lab as well as IV steroids. The CT scan of the chest did not show PE but did show infiltrates. He was previously admitted for renal stones. This was on 2/21/2018. In February 2018 he was admitted for dehydration. S/p  COVID 19  This is a chronic problem. Improved. .  The problem is in good control. There are no new aggravating or relieving factors. There is no history of any new associated signs, symptoms, or complications. He very rarely uses the oxygen at this time. Review of Systems   Constitutional: Negative for chills and fever. Eyes: Negative for blurred vision.    Respiratory: Negative for cough, hemoptysis, sputum production, shortness of breath and wheezing. Cardiovascular: Negative for chest pain. Gastrointestinal: Negative for heartburn. Genitourinary: Negative for dysuria and urgency. Musculoskeletal: Negative for myalgias. Neurological: Negative for sensory change. Health Maintenance Due   Topic Date Due    COVID-19 Vaccine (1) Never done    DTaP/Tdap/Td series (1 - Tdap) Never done    Lipid Screen  Never done    Colorectal Cancer Screening Combo  Never done    Shingrix Vaccine Age 50> (1 of 2) Never done    Flu Vaccine (1) Never done     Wants to hold on health maintenance at this time. Point of the risk benefits and alternatives. All questions answered and understood. Current Outpatient Medications   Medication Sig    docusate sodium (COLACE) 100 mg capsule Take 1 Capsule by mouth two (2) times daily as needed for Constipation for up to 90 days. No current facility-administered medications for this visit. Allergies   Allergen Reactions    Codeine Other (comments)     Pt. States \"I throw up\". has Dehydration, Acute kidney injury (nontraumatic) ([address] Right ureteral stone, Right nephrolithiasis, Hypoxia, and Pneumonia due to COVID-19 virus on their problem list.    Past Surgical History:   Procedure Laterality Date    HX OTHER SURGICAL      Stent placed in Right Kidney on 2/12/2018      reports that he has never smoked. He has never used smokeless tobacco. He reports previous drug use. Drugs: Marijuana and Cocaine. He reports that he does not drink alcohol.  family history is not on file. Physical Exam  Constitutional:       General: He is not in acute distress. HENT:      Right Ear: External ear normal.      Left Ear: External ear normal.      Nose: Nose normal.      Mouth/Throat:      Mouth: Mucous membranes are moist.      Pharynx: Oropharynx is clear. Eyes:      General: No scleral icterus.      Extraocular Movements: Extraocular movements intact. Pupils: Pupils are equal, round, and reactive to light. Pulmonary:      Effort: Pulmonary effort is normal.   Musculoskeletal:         General: No swelling. Right lower leg: No edema. Left lower leg: No edema. Skin:     Coloration: Skin is not jaundiced. Findings: No erythema. Neurological:      Mental Status: He is alert and oriented to person, place, and time. Coordination: Coordination normal.      Gait: Gait normal.   Psychiatric:         Mood and Affect: Mood normal.         Behavior: Behavior normal.          We discussed the expected course, resolution and complications of the diagnosis(es) in detail. Medication risks, benefits, costs, interactions, and alternatives were discussed as indicated. I advised him to contact the office if his condition worsens, changes or fails to improve as anticipated. He expressed understanding with the diagnosis(es) and plan. This note was done with the assistance of dragon speech software.   Some inadvertent errors or omissions may be present

## 2022-01-27 ENCOUNTER — PATIENT OUTREACH (OUTPATIENT)
Dept: CASE MANAGEMENT | Age: 52
End: 2022-01-27

## 2022-01-27 NOTE — PROGRESS NOTES
Patient has graduated from the Transitions of Care Coordination  program on 1/27/22. Patient reports that he is doing well and symptoms have improved. No questions, concerns and/or needs at this time as per Pt. Patient's symptoms are stable at this time. Patient/family has the ability to self-manage. Care management goals have been completed at this time. No further care transitions nurse follow up scheduled. Patient has care transitions nurse contact information for any further questions, concerns, or needs. Patient's upcoming visits:  No future appointments. This episode is closed and resolved.

## 2022-03-18 PROBLEM — N17.9 ACUTE KIDNEY INJURY (NONTRAUMATIC) (HCC): Status: ACTIVE | Noted: 2018-02-10

## 2022-03-18 PROBLEM — N20.0 RIGHT NEPHROLITHIASIS: Status: ACTIVE | Noted: 2018-02-10

## 2022-03-19 PROBLEM — N20.1 RIGHT URETERAL STONE: Status: ACTIVE | Noted: 2018-02-10

## 2022-03-19 PROBLEM — R09.02 HYPOXIA: Status: ACTIVE | Noted: 2021-12-01

## 2022-03-20 PROBLEM — J12.82 PNEUMONIA DUE TO COVID-19 VIRUS: Status: ACTIVE | Noted: 2021-12-01

## 2022-03-20 PROBLEM — U07.1 PNEUMONIA DUE TO COVID-19 VIRUS: Status: ACTIVE | Noted: 2021-12-01

## 2022-03-20 PROBLEM — E86.0 DEHYDRATION: Status: ACTIVE | Noted: 2018-02-10

## 2024-03-29 ENCOUNTER — APPOINTMENT (OUTPATIENT)
Facility: HOSPITAL | Age: 54
End: 2024-03-29
Payer: COMMERCIAL

## 2024-03-29 ENCOUNTER — HOSPITAL ENCOUNTER (EMERGENCY)
Facility: HOSPITAL | Age: 54
Discharge: HOME | End: 2024-03-29
Attending: STUDENT IN AN ORGANIZED HEALTH CARE EDUCATION/TRAINING PROGRAM
Payer: COMMERCIAL

## 2024-03-29 VITALS
WEIGHT: 175 LBS | BODY MASS INDEX: 25.05 KG/M2 | HEART RATE: 86 BPM | RESPIRATION RATE: 16 BRPM | DIASTOLIC BLOOD PRESSURE: 69 MMHG | TEMPERATURE: 97.9 F | HEIGHT: 70 IN | OXYGEN SATURATION: 99 % | SYSTOLIC BLOOD PRESSURE: 110 MMHG

## 2024-03-29 DIAGNOSIS — R07.9 CHEST PAIN, UNSPECIFIED TYPE: Primary | ICD-10-CM

## 2024-03-29 LAB
ALBUMIN SERPL-MCNC: 4.2 G/DL (ref 3.4–5)
ALBUMIN/GLOB SERPL: 1.3 (ref 0.8–1.7)
ALP SERPL-CCNC: 49 U/L (ref 45–117)
ALT SERPL-CCNC: 27 U/L (ref 16–61)
ANION GAP SERPL CALC-SCNC: 8 MMOL/L (ref 3–18)
AST SERPL-CCNC: 19 U/L (ref 10–38)
BASOPHILS # BLD: 0.1 K/UL (ref 0–0.1)
BASOPHILS NFR BLD: 1 % (ref 0–2)
BILIRUB SERPL-MCNC: 0.5 MG/DL (ref 0.2–1)
BUN SERPL-MCNC: 14 MG/DL (ref 7–18)
BUN/CREAT SERPL: 11 (ref 12–20)
CALCIUM SERPL-MCNC: 9.2 MG/DL (ref 8.5–10.1)
CHLORIDE SERPL-SCNC: 109 MMOL/L (ref 100–111)
CO2 SERPL-SCNC: 23 MMOL/L (ref 21–32)
CREAT SERPL-MCNC: 1.31 MG/DL (ref 0.6–1.3)
DIFFERENTIAL METHOD BLD: ABNORMAL
EOSINOPHIL # BLD: 0 K/UL (ref 0–0.4)
EOSINOPHIL NFR BLD: 0 % (ref 0–5)
ERYTHROCYTE [DISTWIDTH] IN BLOOD BY AUTOMATED COUNT: 13 % (ref 11.6–14.5)
GLOBULIN SER CALC-MCNC: 3.2 G/DL (ref 2–4)
GLUCOSE SERPL-MCNC: 99 MG/DL (ref 74–99)
HCT VFR BLD AUTO: 45.4 % (ref 36–48)
HGB BLD-MCNC: 15.6 G/DL (ref 13–16)
IMM GRANULOCYTES # BLD AUTO: 0.1 K/UL (ref 0–0.04)
IMM GRANULOCYTES NFR BLD AUTO: 0 % (ref 0–0.5)
LYMPHOCYTES # BLD: 1.9 K/UL (ref 0.9–3.6)
LYMPHOCYTES NFR BLD: 15 % (ref 21–52)
MCH RBC QN AUTO: 29.3 PG (ref 24–34)
MCHC RBC AUTO-ENTMCNC: 34.4 G/DL (ref 31–37)
MCV RBC AUTO: 85.2 FL (ref 78–100)
MONOCYTES # BLD: 0.6 K/UL (ref 0.05–1.2)
MONOCYTES NFR BLD: 5 % (ref 3–10)
NEUTS SEG # BLD: 10 K/UL (ref 1.8–8)
NEUTS SEG NFR BLD: 79 % (ref 40–73)
NRBC # BLD: 0 K/UL (ref 0–0.01)
NRBC BLD-RTO: 0 PER 100 WBC
NT PRO BNP: 28 PG/ML (ref 0–900)
PLATELET # BLD AUTO: 220 K/UL (ref 135–420)
PMV BLD AUTO: 10.3 FL (ref 9.2–11.8)
POTASSIUM SERPL-SCNC: 4 MMOL/L (ref 3.5–5.5)
PROT SERPL-MCNC: 7.4 G/DL (ref 6.4–8.2)
RBC # BLD AUTO: 5.33 M/UL (ref 4.35–5.65)
SODIUM SERPL-SCNC: 140 MMOL/L (ref 136–145)
TROPONIN I SERPL HS-MCNC: 3 NG/L (ref 0–78)
TROPONIN I SERPL HS-MCNC: 3 NG/L (ref 0–78)
WBC # BLD AUTO: 12.7 K/UL (ref 4.6–13.2)

## 2024-03-29 PROCEDURE — 80053 COMPREHEN METABOLIC PANEL: CPT

## 2024-03-29 PROCEDURE — 71045 X-RAY EXAM CHEST 1 VIEW: CPT

## 2024-03-29 PROCEDURE — 99285 EMERGENCY DEPT VISIT HI MDM: CPT

## 2024-03-29 PROCEDURE — 83880 ASSAY OF NATRIURETIC PEPTIDE: CPT

## 2024-03-29 PROCEDURE — 85025 COMPLETE CBC W/AUTO DIFF WBC: CPT

## 2024-03-29 PROCEDURE — 94761 N-INVAS EAR/PLS OXIMETRY MLT: CPT

## 2024-03-29 PROCEDURE — 84484 ASSAY OF TROPONIN QUANT: CPT

## 2024-03-29 ASSESSMENT — LIFESTYLE VARIABLES
HOW MANY STANDARD DRINKS CONTAINING ALCOHOL DO YOU HAVE ON A TYPICAL DAY: PATIENT DOES NOT DRINK
HOW OFTEN DO YOU HAVE A DRINK CONTAINING ALCOHOL: NEVER

## 2024-03-29 ASSESSMENT — PAIN SCALES - GENERAL
PAINLEVEL_OUTOF10: 1
PAINLEVEL_OUTOF10: 1
PAINLEVEL_OUTOF10: 0

## 2024-03-29 ASSESSMENT — PAIN DESCRIPTION - LOCATION
LOCATION: CHEST
LOCATION: CHEST

## 2024-03-29 ASSESSMENT — PAIN DESCRIPTION - ONSET: ONSET: ON-GOING

## 2024-03-29 ASSESSMENT — PAIN DESCRIPTION - FREQUENCY: FREQUENCY: INTERMITTENT

## 2024-03-29 ASSESSMENT — PAIN DESCRIPTION - ORIENTATION: ORIENTATION: LEFT;UPPER

## 2024-03-29 ASSESSMENT — PAIN DESCRIPTION - PAIN TYPE: TYPE: ACUTE PAIN

## 2024-03-29 ASSESSMENT — PAIN DESCRIPTION - DESCRIPTORS: DESCRIPTORS: DULL

## 2024-03-29 ASSESSMENT — PAIN - FUNCTIONAL ASSESSMENT
PAIN_FUNCTIONAL_ASSESSMENT: 0-10
PAIN_FUNCTIONAL_ASSESSMENT: ACTIVITIES ARE NOT PREVENTED

## 2024-03-29 NOTE — ED NOTES
Assumed care of patient. Laying in bed, eyes open, calm, cooperative. Skin is warm and dry to touch. No respiratory distress observed. IV flushed, saline locked.

## 2024-03-29 NOTE — ED PROVIDER NOTES
Differential Type AUTOMATED     Comprehensive Metabolic Panel    Collection Time: 03/29/24  7:45 PM   Result Value Ref Range    Sodium 140 136 - 145 mmol/L    Potassium 4.0 3.5 - 5.5 mmol/L    Chloride 109 100 - 111 mmol/L    CO2 23 21 - 32 mmol/L    Anion Gap 8 3.0 - 18 mmol/L    Glucose 99 74 - 99 mg/dL    BUN 14 7.0 - 18 MG/DL    Creatinine 1.31 (H) 0.6 - 1.3 MG/DL    Bun/Cre Ratio 11 (L) 12 - 20      Est, Glom Filt Rate 65 >60 ml/min/1.73m2    Calcium 9.2 8.5 - 10.1 MG/DL    Total Bilirubin 0.5 0.2 - 1.0 MG/DL    ALT 27 16 - 61 U/L    AST 19 10 - 38 U/L    Alk Phosphatase 49 45 - 117 U/L    Total Protein 7.4 6.4 - 8.2 g/dL    Albumin 4.2 3.4 - 5.0 g/dL    Globulin 3.2 2.0 - 4.0 g/dL    Albumin/Globulin Ratio 1.3 0.8 - 1.7     Troponin    Collection Time: 03/29/24  7:45 PM   Result Value Ref Range    Troponin, High Sensitivity 3 0 - 78 ng/L   Brain Natriuretic Peptide    Collection Time: 03/29/24  7:45 PM   Result Value Ref Range    NT Pro-BNP 28 0 - 900 PG/ML   Troponin    Collection Time: 03/29/24  9:05 PM   Result Value Ref Range    Troponin, High Sensitivity 3 0 - 78 ng/L       Radiologic Studies -   XR CHEST PORTABLE   Final Result      No acute cardiopulmonary process.                  Medical Decision Making   I am the first provider for this patient.    I reviewed the vital signs, available nursing notes, past medical history, past surgical history, family history and social history.    Vital Signs-Reviewed the patient's vital signs.      EKG: ed course      ED Course: Progress Notes, Reevaluation, and Consults:    Provider Notes (Medical Decision Making):     MDM  53-year-old male presents with chest pain.  Will consider ACS due to patient's age however he has no comorbidities.  Will consider stress-induced chest pain as patient has lot of family stressors at home.  Also consider pericarditis versus pneumonia versus CHF however patient does not appear to be fluid overloaded.  Will obtain labs and

## 2024-03-29 NOTE — ED TRIAGE NOTES
Pt arrived via Triage ambulatory c/o chest pain for the last week. Pt states he was at [employer] this evening and started feeling light headed, weak and hot.

## 2024-03-30 NOTE — DISCHARGE INSTRUCTIONS
You were evaluated for chest pain.  Based on your work-up it was deemed that she was stable for discharge.  Please follow-up with your primary care physician if you have any further concerns and go over your work-up.  If you experience any chest pain, shortness of breath, worsening abdominal pain, vomiting blood, worsening headache, seizures, or any worsening of your symptoms please return to the emergency department immediately.  If you have any pending results or any further questions please contact the emergency department at [phone].

## 2024-03-30 NOTE — ED NOTES
Patient discharged to home. IV removed. Discharge papers explained and given to patient. Escorted patient to ER exit. Patient refused wheel chair.

## 2024-03-30 NOTE — ED NOTES
Patient laying in bed, semi galindo, pillow supporting head and neck. Vital signs monitoring at bedside. Skin is warm and dry to touch. No respiratory distress observed. Wife at bedside. Denies pain.

## 2024-03-31 LAB
EKG ATRIAL RATE: 61 BPM
EKG DIAGNOSIS: NORMAL
EKG P AXIS: 36 DEGREES
EKG P-R INTERVAL: 122 MS
EKG Q-T INTERVAL: 378 MS
EKG QRS DURATION: 82 MS
EKG QTC CALCULATION (BAZETT): 380 MS
EKG R AXIS: 37 DEGREES
EKG T AXIS: 55 DEGREES
EKG VENTRICULAR RATE: 61 BPM

## (undated) DEVICE — Z DUP USE 2565107 PACK SURG PROC LEG CYSTO T-DRAPE REINF TBL CVR HND TWL

## (undated) DEVICE — AIRLIFE™ NASAL OXYGEN CANNULA CURVED, FLARED TIP WITH 14 FOOT (4.3 M) CRUSH-RESISTANT TUBING, OVER-THE-EAR STYLE: Brand: AIRLIFE™

## (undated) DEVICE — SOLUTION IRRIG 3000ML 0.9% SOD CHL FLX CONT 0797208] ICU MEDICAL INC]

## (undated) DEVICE — Y-TYPE TUR/BLADDER IRRIGATION SET, REGULATING CLAMP

## (undated) DEVICE — 3M™ BAIR PAWS FLEX™ WARMING GOWN, STANDARD, 20 PER CASE 81003: Brand: BAIR PAWS™

## (undated) DEVICE — TRAY PREP DRY W/ PREM GLV 2 APPL 6 SPNG 2 UNDPD 1 OVERWRAP

## (undated) DEVICE — GAUZE SPONGES,16 PLY: Brand: CURITY

## (undated) DEVICE — LUB SURG MEDC STRL 2OZ TUBE MC -- MEDICHOICE

## (undated) DEVICE — STER SINGLE BASIN SET W/BOWLS: Brand: CARDINAL HEALTH

## (undated) DEVICE — SOLUTION SCRB 4OZ 10% PVP I POVIDONE IOD TOP PAINT EXIDINE

## (undated) DEVICE — SYR 10ML CTRL LR LCK NSAF LF --

## (undated) DEVICE — BAG DRAINAGE CUST DISP

## (undated) DEVICE — 4-PORT MANIFOLD: Brand: NEPTUNE 2

## (undated) DEVICE — GOWN,PREVENTION PLUS,XLN/XL,ST,24/CS: Brand: MEDLINE

## (undated) DEVICE — STERILE POLYISOPRENE POWDER-FREE SURGICAL GLOVES: Brand: PROTEXIS

## (undated) DEVICE — URETERAL STENT
Type: IMPLANTABLE DEVICE | Site: URETER | Status: NON-FUNCTIONAL
Brand: POLARIS™ ULTRA
Removed: 2018-02-21

## (undated) DEVICE — Z DISCONTINUED NO SUB IDED CATHETER CTRL VEN 10FR TIP TO CUF L54CM 2 LUMN W/ SURECUF

## (undated) DEVICE — SOLUTION IRRIG 3000ML H2O STRL BAG

## (undated) DEVICE — GDWIRE URET STR STD .038X150 -- ZIPWIRE STD

## (undated) DEVICE — SKIN MARKER,REGULAR TIP WITH RULER AND LABELS: Brand: DEVON

## (undated) DEVICE — MEDI-VAC NON-CONDUCTIVE SUCTION TUBING: Brand: CARDINAL HEALTH

## (undated) DEVICE — GDWIRE 3CM FLX-TIP 0.038X150CM -- BX/5 SENSOR

## (undated) DEVICE — KENDALL SCD EXPRESS SLEEVES, KNEE LENGTH, MEDIUM: Brand: KENDALL SCD

## (undated) DEVICE — SOLUTION IRRIG 1000ML H2O STRL BLT

## (undated) DEVICE — KIT CLN UP BON SECOURS MARYV